# Patient Record
Sex: FEMALE | Race: WHITE | NOT HISPANIC OR LATINO | Employment: UNEMPLOYED | URBAN - NONMETROPOLITAN AREA
[De-identification: names, ages, dates, MRNs, and addresses within clinical notes are randomized per-mention and may not be internally consistent; named-entity substitution may affect disease eponyms.]

---

## 2017-01-04 ENCOUNTER — CONSULT (OUTPATIENT)
Dept: CARDIOLOGY | Facility: CLINIC | Age: 42
End: 2017-01-04

## 2017-01-04 VITALS
OXYGEN SATURATION: 99 % | DIASTOLIC BLOOD PRESSURE: 80 MMHG | WEIGHT: 155.6 LBS | BODY MASS INDEX: 26.56 KG/M2 | RESPIRATION RATE: 16 BRPM | SYSTOLIC BLOOD PRESSURE: 118 MMHG | HEIGHT: 64 IN | HEART RATE: 100 BPM

## 2017-01-04 DIAGNOSIS — R00.2 PALPITATIONS: Primary | ICD-10-CM

## 2017-01-04 PROCEDURE — 99203 OFFICE O/P NEW LOW 30 MIN: CPT | Performed by: INTERNAL MEDICINE

## 2017-01-04 RX ORDER — PROPRANOLOL HYDROCHLORIDE 10 MG/1
TABLET ORAL 3 TIMES DAILY PRN
COMMUNITY
Start: 2016-12-05 | End: 2017-01-25

## 2017-01-04 NOTE — PROGRESS NOTES
"    Subjective:     Encounter Date:01/04/2017      Patient ID: Stacy Guardado is a 41 y.o. female.    Chief Complaint: Palpitations.  HPI  This is a 41-year-old female patient who is been experiencing palpitations for a little over a month.  The patient was initially seen by local cardiologist who did a Holter monitor that demonstrated no evidence of arrhythmia or significant underlying ectopy.  The patient indicates that she develops a sense that her heart is racing.  There is no associated dizziness or syncope.  The patient also complains that her entire body is \"tingling\".  She has no focal numbness weakness or paralysis.  She has no change in vision or difficulty with speech.  She has had no incoordination or gait disturbances.  The patient was also experiencing lower extremity edema and there was concern that she may have early chronic venous stasis.  Her lower extremity edema has now spontaneously resolved without specific intervention.  The tingling episodes occur every day and last almost continuously throughout her waking hours.  She indicates that she drinks one to 2 caffeinated beverages per day.  There is no history of thyroid disease.  She smokes one pack of cigarettes per day.  She was previously running up to 3 miles 3 times per week but has stopped exercising.  She has no chest pain or shortness of breath.  There is no dyspnea with activity.  There is no exertional chest arm neck jaw shoulder or back discomfort.  There is no orthopnea or PND.  She has no personal history of hypertension diabetes or hypercholesterolemia.  Her family history is strongly positive for premature coronary disease.  The following portions of the patient's history were reviewed and updated as appropriate: allergies, current medications, past family history, past medical history, past social history, past surgical history and problem  Review of Systems   Constitution: Negative for chills, diaphoresis, fever, weakness, " malaise/fatigue, weight gain and weight loss.   HENT: Negative for ear discharge, hearing loss, hoarse voice and nosebleeds.    Eyes: Negative for discharge, double vision, pain and photophobia.   Cardiovascular: Positive for palpitations. Negative for chest pain, claudication, cyanosis, dyspnea on exertion, irregular heartbeat, leg swelling, near-syncope, orthopnea, paroxysmal nocturnal dyspnea and syncope.   Respiratory: Negative for cough, hemoptysis, shortness of breath, sputum production and wheezing.    Endocrine: Negative for cold intolerance, heat intolerance, polydipsia, polyphagia and polyuria.   Hematologic/Lymphatic: Negative for adenopathy and bleeding problem. Does not bruise/bleed easily.   Skin: Negative for color change, flushing, itching and rash.   Musculoskeletal: Negative for muscle cramps, muscle weakness, myalgias and stiffness.   Gastrointestinal: Negative for abdominal pain, diarrhea, hematemesis, hematochezia, nausea and vomiting.   Genitourinary: Negative for dysuria, frequency and nocturia.   Neurological: Negative for dizziness, focal weakness, light-headedness, loss of balance, numbness, paresthesias and seizures.   Psychiatric/Behavioral: Negative for altered mental status, hallucinations and suicidal ideas.   Allergic/Immunologic: Negative for HIV exposure, hives and persistent infections.       Current Outpatient Prescriptions:   •  propranolol (INDERAL) 10 MG tablet, 3 (Three) Times a Day As Needed., Disp: , Rfl:      Objective:     Physical Exam   Constitutional: She is oriented to person, place, and time. She appears well-developed and well-nourished.   HENT:   Head: Normocephalic and atraumatic.   Mouth/Throat: Oropharynx is clear and moist.   Eyes: Conjunctivae and EOM are normal. Pupils are equal, round, and reactive to light. No scleral icterus.   Neck: Normal range of motion. Neck supple. No JVD present. No tracheal deviation present. No thyromegaly present.   Cardiovascular:  "Normal rate, regular rhythm, S1 normal, S2 normal, normal heart sounds, intact distal pulses and normal pulses.  PMI is not displaced.  Exam reveals no gallop and no friction rub.    No murmur heard.  Pulmonary/Chest: Effort normal and breath sounds normal. No respiratory distress. She has no wheezes. She has no rales.   Abdominal: Soft. Bowel sounds are normal. She exhibits no distension and no mass. There is no tenderness. There is no rebound and no guarding.   Musculoskeletal: Normal range of motion. She exhibits no edema or deformity.   Neurological: She is alert and oriented to person, place, and time. She displays normal reflexes. No cranial nerve deficit. Coordination normal.   Skin: Skin is warm and dry. No rash noted. No erythema.   Psychiatric: She has a normal mood and affect. Her behavior is normal. Thought content normal.     Blood pressure 118/80, pulse 100, resp. rate 16, height 64\" (162.6 cm), weight 155 lb 9.6 oz (70.6 kg), SpO2 99 %.   Lab Review:       Assessment:         1. Palpitations  The patient indicates that she does not feel that she was having any tachycardia symptoms while wearing the Holter monitor.  She indicates that she had some sense of tachycardia last evening and took a when necessary propranolol which seems to help.  - Cardiac Event Monitor; Future  Procedures     Plan:       I have recommended a 30 day event recorder.  I have indicated that she can continue to use the propranolol on an as-needed basis.  He has been counseled regarding the need to discontinue cigarette smoking.  She has been counseled regarding the importance of limiting intake of caffeinated beverages as well as avoiding dietary sodium.  She is encouraged to resume her physical activities.  No changes in her medication profile have been made at today's visit.  Further recommendations will be predicated on the outcome of her outpatient testing.         "

## 2017-01-04 NOTE — MR AVS SNAPSHOT
Stacy Guardado   2017 3:00 PM   Consult    Dept Phone:  [phone]   Encounter #:  [id number]    Provider:  Nitin Raymond MD   Department:  [facility] CARDIOLOGY                Your Full Care Plan              Your Updated Medication List          This list is accurate as of: 17  3:34 PM.  Always use your most recent med list.                propranolol 10 MG tablet   Commonly known as:  INDERAL               You Were Diagnosed With        Codes Comments    Palpitations    -  Primary ICD-10-CM: R00.2  ICD-9-CM: 785.1       Instructions     None    Patient Instructions History      Upcoming Appointments     Visit Type Date Time Department    CONSULT 2017  3:00 PM [facility] [place]    FOLLOW UP 2017  9:30 AM [facility] [place]      [facility] Signup     [facility] allows you to send messages to your doctor, view your test results, renew your prescriptions, schedule appointments, and more. To sign up, go to [employer] and click on the Sign Up Now link in the New User? box. Enter your [facility] Activation Code exactly as it appears below along with the last four digits of your Social Security Number and your Date of Birth () to complete the sign-up process. If you do not sign up before the expiration date, you must request a new code.    [facility] Activation Code: [email or web address]  Expires: 2017  3:32 PM    If you have questions, you can email [email or web address] or call [phone] to talk to our [facility] staff. Remember, [facility] is NOT to be used for urgent needs. For medical emergencies, dial 911.               Other Info from Your Visit           Your Appointments     2017  9:30 AM EST   Follow Up with Nitin Raymond MD   [facility] CARDIOLOGY (--)    [address] [place] [id number] [phone]           Arrive 15 minutes prior to appointment.            "     Allergies     No Known Allergies      Reason for Visit     Advice Only     Rapid Heart Rate     Edema     Palpitations           Vital Signs     Blood Pressure Pulse Respirations Height Weight Oxygen Saturation    118/80 (BP Location: Left arm, Patient Position: Sitting, Cuff Size: Adult) 100 16 64\" (162.6 cm) 155 lb 9.6 oz (70.6 kg) 99%    Body Mass Index Smoking Status                26.71 kg/m2 Current Every Day Smoker          Problems and Diagnoses Noted     Palpitations        "

## 2017-01-04 NOTE — LETTER
January 4, 2017     Sophia Pak MD  [address] [place] [address]    Patient: Stacy Guardado   YOB: 1975   Date of Visit: 1/4/2017       Dear Dr. Sierra Pak MD:    Thank you for referring Stacy Guardado to me for evaluation. Below are the relevant portions of my assessment and plan of care.  I had the opportunity to see your patient in cardiology clinic today.  This is a 41-year-old female patient who has been experiencing palpitations in which she describes a sense that her heart is racing.  She also reports having the sensation that she is tingling all over.  This occurs most every day and the tingling sensation is present throughout her waking hours.  She does not seem to use much in the way of caffeinated beverages but she does smoke.  She had previously seen a local cardiologist.  Holter monitor which showed no evidence of arrhythmia or significant ectopy.  She has no chest pain or shortness of breath.  She was previously experiencing swelling of her lower extremities but this has resolved without specific intervention.  She indicates that she has had no swelling in the last one month.  I have recommended a 30 day event recorder.  No additional change in her medication profile as warranted at this time.  Further recommendations will be predicated on the outcome of her outpatient testing.  If you have questions, please do not hesitate to call me. I look forward to following Stacy along with you.         Sincerely,        Nitin Raymond MD        CC: No Recipients  Nitin Raymond MD  1/4/2017  3:39 PM  Signed      Subjective:     Encounter Date:01/04/2017      Patient ID: Stacy Guardado is a 41 y.o. female.    Chief Complaint: Palpitations.  HPI  This is a 41-year-old female patient who is been experiencing palpitations for a little over a month.  The patient was initially seen by local cardiologist who did a Holter monitor that demonstrated no  "evidence of arrhythmia or significant underlying ectopy.  The patient indicates that she develops a sense that her heart is racing.  There is no associated dizziness or syncope.  The patient also complains that her entire body is \"tingling\".  She has no focal numbness weakness or paralysis.  She has no change in vision or difficulty with speech.  She has had no incoordination or gait disturbances.  The patient was also experiencing lower extremity edema and there was concern that she may have early chronic venous stasis.  Her lower extremity edema has now spontaneously resolved without specific intervention.  The tingling episodes occur every day and last almost continuously throughout her waking hours.  She indicates that she drinks one to 2 caffeinated beverages per day.  There is no history of thyroid disease.  She smokes one pack of cigarettes per day.  She was previously running up to 3 miles 3 times per week but has stopped exercising.  She has no chest pain or shortness of breath.  There is no dyspnea with activity.  There is no exertional chest arm neck jaw shoulder or back discomfort.  There is no orthopnea or PND.  She has no personal history of hypertension diabetes or hypercholesterolemia.  Her family history is strongly positive for premature coronary disease.  The following portions of the patient's history were reviewed and updated as appropriate: allergies, current medications, past family history, past medical history, past social history, past surgical history and problem  Review of Systems   Constitution: Negative for chills, diaphoresis, fever, weakness, malaise/fatigue, weight gain and weight loss.   HENT: Negative for ear discharge, hearing loss, hoarse voice and nosebleeds.    Eyes: Negative for discharge, double vision, pain and photophobia.   Cardiovascular: Positive for palpitations. Negative for chest pain, claudication, cyanosis, dyspnea on exertion, irregular heartbeat, leg swelling, " near-syncope, orthopnea, paroxysmal nocturnal dyspnea and syncope.   Respiratory: Negative for cough, hemoptysis, shortness of breath, sputum production and wheezing.    Endocrine: Negative for cold intolerance, heat intolerance, polydipsia, polyphagia and polyuria.   Hematologic/Lymphatic: Negative for adenopathy and bleeding problem. Does not bruise/bleed easily.   Skin: Negative for color change, flushing, itching and rash.   Musculoskeletal: Negative for muscle cramps, muscle weakness, myalgias and stiffness.   Gastrointestinal: Negative for abdominal pain, diarrhea, hematemesis, hematochezia, nausea and vomiting.   Genitourinary: Negative for dysuria, frequency and nocturia.   Neurological: Negative for dizziness, focal weakness, light-headedness, loss of balance, numbness, paresthesias and seizures.   Psychiatric/Behavioral: Negative for altered mental status, hallucinations and suicidal ideas.   Allergic/Immunologic: Negative for HIV exposure, hives and persistent infections.       Current Outpatient Prescriptions:   •  propranolol (INDERAL) 10 MG tablet, 3 (Three) Times a Day As Needed., Disp: , Rfl:      Objective:     Physical Exam   Constitutional: She is oriented to person, place, and time. She appears well-developed and well-nourished.   HENT:   Head: Normocephalic and atraumatic.   Mouth/Throat: Oropharynx is clear and moist.   Eyes: Conjunctivae and EOM are normal. Pupils are equal, round, and reactive to light. No scleral icterus.   Neck: Normal range of motion. Neck supple. No JVD present. No tracheal deviation present. No thyromegaly present.   Cardiovascular: Normal rate, regular rhythm, S1 normal, S2 normal, normal heart sounds, intact distal pulses and normal pulses.  PMI is not displaced.  Exam reveals no gallop and no friction rub.    No murmur heard.  Pulmonary/Chest: Effort normal and breath sounds normal. No respiratory distress. She has no wheezes. She has no rales.   Abdominal: Soft.  "Bowel sounds are normal. She exhibits no distension and no mass. There is no tenderness. There is no rebound and no guarding.   Musculoskeletal: Normal range of motion. She exhibits no edema or deformity.   Neurological: She is alert and oriented to person, place, and time. She displays normal reflexes. No cranial nerve deficit. Coordination normal.   Skin: Skin is warm and dry. No rash noted. No erythema.   Psychiatric: She has a normal mood and affect. Her behavior is normal. Thought content normal.     Blood pressure 118/80, pulse 100, resp. rate 16, height 64\" (162.6 cm), weight 155 lb 9.6 oz (70.6 kg), SpO2 99 %.   Lab Review:       Assessment:         1. Palpitations  The patient indicates that she does not feel that she was having any tachycardia symptoms while wearing the Holter monitor.  She indicates that she had some sense of tachycardia last evening and took a when necessary propranolol which seems to help.  - Cardiac Event Monitor; Future  Procedures     Plan:       I have recommended a 30 day event recorder.  I have indicated that she can continue to use the propranolol on an as-needed basis.  He has been counseled regarding the need to discontinue cigarette smoking.  She has been counseled regarding the importance of limiting intake of caffeinated beverages as well as avoiding dietary sodium.  She is encouraged to resume her physical activities.  No changes in her medication profile have been made at today's visit.  Further recommendations will be predicated on the outcome of her outpatient testing.         "

## 2017-01-06 PROBLEM — F32.A DEPRESSION: Status: ACTIVE | Noted: 2017-01-06

## 2017-01-06 PROBLEM — F41.9 ANXIETY: Status: ACTIVE | Noted: 2017-01-06

## 2017-01-06 PROBLEM — Z72.0 TOBACCO ABUSE: Status: ACTIVE | Noted: 2017-01-06

## 2017-01-06 PROBLEM — R29.810 FACIAL DROOP: Status: ACTIVE | Noted: 2017-01-06

## 2017-01-25 ENCOUNTER — HOSPITAL ENCOUNTER (EMERGENCY)
Facility: HOSPITAL | Age: 42
Discharge: HOME | End: 2017-01-25
Attending: EMERGENCY MEDICINE | Admitting: EMERGENCY MEDICINE

## 2017-01-25 VITALS
OXYGEN SATURATION: 98 % | WEIGHT: 154 LBS | HEART RATE: 89 BPM | SYSTOLIC BLOOD PRESSURE: 117 MMHG | TEMPERATURE: 98.6 F | HEIGHT: 64 IN | RESPIRATION RATE: 18 BRPM | BODY MASS INDEX: 26.29 KG/M2 | DIASTOLIC BLOOD PRESSURE: 77 MMHG

## 2017-01-25 DIAGNOSIS — R20.2 TINGLING IN EXTREMITIES: Primary | ICD-10-CM

## 2017-01-25 LAB
ANION GAP SERPL CALCULATED.3IONS-SCNC: 14.1 MMOL/L
B-HCG UR QL: NEGATIVE
BASOPHILS # BLD AUTO: 0.06 10*3/MM3 (ref 0–0.2)
BASOPHILS NFR BLD AUTO: 0.6 % (ref 0–2.5)
BILIRUB UR QL STRIP: NEGATIVE
BUN BLD-MCNC: 9 MG/DL (ref 7–20)
BUN/CREAT SERPL: 11.3 (ref 7.1–23.5)
CALCIUM SPEC-SCNC: 9.7 MG/DL (ref 8.4–10.2)
CHLORIDE SERPL-SCNC: 108 MMOL/L (ref 98–107)
CLARITY UR: CLEAR
CO2 SERPL-SCNC: 27 MMOL/L (ref 26–30)
COLOR UR: YELLOW
CREAT BLD-MCNC: 0.8 MG/DL (ref 0.6–1.3)
DEPRECATED RDW RBC AUTO: 46.1 FL (ref 37–54)
EOSINOPHIL # BLD AUTO: 0.03 10*3/MM3 (ref 0–0.7)
EOSINOPHIL NFR BLD AUTO: 0.3 % (ref 0–7)
ERYTHROCYTE [DISTWIDTH] IN BLOOD BY AUTOMATED COUNT: 13 % (ref 11.5–14.5)
GFR SERPL CREATININE-BSD FRML MDRD: 80 ML/MIN/1.73
GLUCOSE BLD-MCNC: 80 MG/DL (ref 74–98)
GLUCOSE UR STRIP-MCNC: NEGATIVE MG/DL
HCT VFR BLD AUTO: 46.2 % (ref 37–47)
HGB BLD-MCNC: 15.3 G/DL (ref 12–16)
HGB UR QL STRIP.AUTO: NEGATIVE
HOLD SPECIMEN: NORMAL
HOLD SPECIMEN: NORMAL
IMM GRANULOCYTES # BLD: 0.06 10*3/MM3 (ref 0–0.06)
IMM GRANULOCYTES NFR BLD: 0.6 % (ref 0–0.6)
KETONES UR QL STRIP: NEGATIVE
LEUKOCYTE ESTERASE UR QL STRIP.AUTO: NEGATIVE
LYMPHOCYTES # BLD AUTO: 2.54 10*3/MM3 (ref 0.6–3.4)
LYMPHOCYTES NFR BLD AUTO: 24.1 % (ref 10–50)
MCH RBC QN AUTO: 31.8 PG (ref 27–31)
MCHC RBC AUTO-ENTMCNC: 33.1 G/DL (ref 30–37)
MCV RBC AUTO: 96 FL (ref 81–99)
MONOCYTES # BLD AUTO: 0.67 10*3/MM3 (ref 0–0.9)
MONOCYTES NFR BLD AUTO: 6.4 % (ref 0–12)
NEUTROPHILS # BLD AUTO: 7.18 10*3/MM3 (ref 2–6.9)
NEUTROPHILS NFR BLD AUTO: 68 % (ref 37–80)
NITRITE UR QL STRIP: NEGATIVE
NRBC BLD MANUAL-RTO: 0 /100 WBC (ref 0–0)
PH UR STRIP.AUTO: 7 [PH] (ref 5–8)
PLATELET # BLD AUTO: 266 10*3/MM3 (ref 130–400)
PMV BLD AUTO: 11.6 FL (ref 6–12)
POTASSIUM BLD-SCNC: 4.1 MMOL/L (ref 3.5–5.1)
PROT UR QL STRIP: NEGATIVE
RBC # BLD AUTO: 4.81 10*6/MM3 (ref 4.2–5.4)
SODIUM BLD-SCNC: 145 MMOL/L (ref 137–145)
SP GR UR STRIP: 1.02 (ref 1–1.03)
UROBILINOGEN UR QL STRIP: NORMAL
WBC NRBC COR # BLD: 10.54 10*3/MM3 (ref 4.8–10.8)
WHOLE BLOOD HOLD SPECIMEN: NORMAL
WHOLE BLOOD HOLD SPECIMEN: NORMAL

## 2017-01-25 PROCEDURE — 80048 BASIC METABOLIC PNL TOTAL CA: CPT | Performed by: EMERGENCY MEDICINE

## 2017-01-25 PROCEDURE — 81025 URINE PREGNANCY TEST: CPT | Performed by: EMERGENCY MEDICINE

## 2017-01-25 PROCEDURE — 93005 ELECTROCARDIOGRAM TRACING: CPT | Performed by: EMERGENCY MEDICINE

## 2017-01-25 PROCEDURE — 85025 COMPLETE CBC W/AUTO DIFF WBC: CPT | Performed by: EMERGENCY MEDICINE

## 2017-01-25 PROCEDURE — 36415 COLL VENOUS BLD VENIPUNCTURE: CPT

## 2017-01-25 PROCEDURE — 81003 URINALYSIS AUTO W/O SCOPE: CPT | Performed by: EMERGENCY MEDICINE

## 2017-01-25 PROCEDURE — 99284 EMERGENCY DEPT VISIT MOD MDM: CPT

## 2017-01-25 NOTE — ED PROVIDER NOTES
Subjective   History of Present Illness   41-year-old female otherwise healthy presenting with 2 months of intermittent tingling in her hands, lips, legs area she has seen multiple providers for this and has been noted to be tachycardic during multiple visits.  Comes into the emergency department now because she felt the symptoms recurred this morning.  Denies any other specific symptoms including fevers, chills, chest pain, trouble breathing, nausea, vomiting, diarrhea, dysuria.    Review of Systems   Neurological:        Parasthesias BUE   All other systems reviewed and are negative.      Past Medical History   Diagnosis Date   • Abnormal ECG    • Current every day smoker        No Known Allergies    History reviewed. No pertinent past surgical history.    Family History   Problem Relation Age of Onset   • Hyperlipidemia Mother    • Hyperlipidemia Father    • Hypertension Father    • Stroke Brother    • Heart attack Maternal Grandfather    • Heart disease Maternal Grandfather    • Heart disease Paternal Grandfather    • Heart attack Paternal Grandfather    • Hyperlipidemia Brother    • Alcohol abuse Other    • Diabetes Other    • Hypertension Other    • Stroke Other        Social History     Social History   • Marital status:      Spouse name: N/A   • Number of children: N/A   • Years of education: N/A     Social History Main Topics   • Smoking status: Current Every Day Smoker     Packs/day: 1.00     Types: Cigarettes   • Smokeless tobacco: Never Used   • Alcohol use Yes      Comment: rare   • Drug use: No   • Sexual activity: Not Asked     Other Topics Concern   • None     Social History Narrative   • None           Objective   Physical Exam   Constitutional: She is oriented to person, place, and time. She appears well-nourished. No distress.   HENT:   Head: Normocephalic.   Mouth/Throat: Oropharynx is clear and moist. No oropharyngeal exudate.   Eyes: Conjunctivae are normal. No scleral icterus.   Neck:  Neck supple. No tracheal deviation present.   Cardiovascular: Regular rhythm, normal heart sounds and intact distal pulses.  Exam reveals no gallop and no friction rub.    No murmur heard.  tachycardic   Pulmonary/Chest: Effort normal and breath sounds normal. No stridor. No respiratory distress. She has no wheezes. She has no rales. She exhibits no tenderness.   Abdominal: Soft. She exhibits no distension and no mass. There is no tenderness. There is no rebound and no guarding. No hernia.   Musculoskeletal: She exhibits no edema or deformity.   Neurological: She is alert and oriented to person, place, and time.   Strength and sensation intact in bilateral upper extremities.   Skin: Skin is warm and dry. She is not diaphoretic. No erythema. No pallor.   Psychiatric: She has a normal mood and affect. Her behavior is normal.   Nursing note and vitals reviewed.      Procedures         ED Course  ED Course                  MDM  41-year-old female 2 months of intermittent paresthesias in hands.  Afebrile, tachycardic.  On exam, neurovascularly intact.  Lungs are clear.  Low suspicion for occult infection given she hasn't worked up for this all times reason.  Unclear etiology of her elevated heart rate or paresthesias, she is already seeing a cardiologist and is wearing a Holter monitor now.  She had her thyroid levels checked about a month and a half ago and they were normal.  She denies any specific symptoms to suggest an occult infection.  I will check CBC, BMP, EKG, urine pregnancy test and if everything is reassuring, will discharge patient home.    EKG: NSR w/ nl intervals, no torrey/std, HR 95.     4:02 PM Labs overall unremarkable.  Heart rate improved.  We'll discharge home with recommendation to follow-up with drug patient providers as scheduled.  Discussed strict return to care precautions.    Final diagnoses:   Tingling in extremities            Demond Spears MD  01/25/17 1247

## 2017-02-14 ENCOUNTER — OFFICE VISIT (OUTPATIENT)
Dept: CARDIOLOGY | Facility: CLINIC | Age: 42
End: 2017-02-14

## 2017-02-14 VITALS
BODY MASS INDEX: 26.91 KG/M2 | RESPIRATION RATE: 16 BRPM | WEIGHT: 157.6 LBS | DIASTOLIC BLOOD PRESSURE: 80 MMHG | OXYGEN SATURATION: 97 % | SYSTOLIC BLOOD PRESSURE: 132 MMHG | HEART RATE: 108 BPM | HEIGHT: 64 IN

## 2017-02-14 DIAGNOSIS — R00.2 PALPITATIONS: Primary | ICD-10-CM

## 2017-02-14 PROCEDURE — 99213 OFFICE O/P EST LOW 20 MIN: CPT | Performed by: INTERNAL MEDICINE

## 2017-02-14 NOTE — PROGRESS NOTES
Subjective:     Encounter Date:02/14/2017      Patient ID: Stacy Guardado is a 41 y.o. female.    Chief Complaint: Palpitations  HPI  This is a 41-year-old female patient who had previously worn a heart monitor without specific findings.  The patient has now worn a 30 day event recorder.  During the 30 day monitoring.  The patient had 13 rhythm strips for review all of which demonstrated sinus tachycardia to rates of just under 120 bpm.  There was no supraventricular or ventricular tachycardia noted.  The patient continues to have palpitations on occasion.  There is no chest pain or shortness of breath.  There is no orthopnea PND or lower extremity edema.  There is no dizziness or syncope.  Unfortunately she continues to smoke.  She has resumed regular exercise and begin a running program.  The remainder of her past medical history, family history and social history remains unchanged compared to her last visit.  The following portions of the patient's history were reviewed and updated as appropriate: allergies, current medications, past family history, past medical history, past social history, past surgical history and problem  Review of Systems   Constitution: Negative for chills, diaphoresis, fever, weakness, malaise/fatigue, night sweats, weight gain and weight loss.   HENT: Negative for ear discharge, hearing loss, hoarse voice and nosebleeds.    Eyes: Negative for discharge, double vision, pain and photophobia.   Cardiovascular: Positive for palpitations. Negative for chest pain, claudication, cyanosis, dyspnea on exertion, irregular heartbeat, leg swelling, near-syncope, orthopnea and paroxysmal nocturnal dyspnea.   Respiratory: Negative for cough, hemoptysis, shortness of breath, sputum production and wheezing.    Endocrine: Negative for cold intolerance, heat intolerance, polydipsia, polyphagia and polyuria.   Hematologic/Lymphatic: Negative for adenopathy and bleeding problem. Does not  "bruise/bleed easily.   Skin: Negative for color change, flushing, itching and rash.   Musculoskeletal: Negative for muscle cramps, muscle weakness, myalgias and stiffness.   Gastrointestinal: Negative for abdominal pain, diarrhea, hematemesis, hematochezia, nausea and vomiting.   Genitourinary: Negative for dysuria, frequency and nocturia.   Neurological: Negative for focal weakness, loss of balance, numbness, paresthesias and seizures.   Psychiatric/Behavioral: Negative for altered mental status, hallucinations and suicidal ideas.   Allergic/Immunologic: Negative for HIV exposure, hives and persistent infections.         No current outpatient prescriptions on file.     Objective:     Physical Exam   Constitutional: She is oriented to person, place, and time. She appears well-developed and well-nourished.   HENT:   Head: Normocephalic and atraumatic.   Eyes: Conjunctivae are normal. No scleral icterus.   Cardiovascular: Normal rate, regular rhythm, normal heart sounds and intact distal pulses.  Exam reveals no gallop and no friction rub.    No murmur heard.  Pulmonary/Chest: Effort normal and breath sounds normal. No respiratory distress.   Abdominal: Soft. Bowel sounds are normal. There is no tenderness.   Musculoskeletal: She exhibits no edema.   Neurological: She is alert and oriented to person, place, and time.   Skin: Skin is warm and dry.   Psychiatric: She has a normal mood and affect. Her behavior is normal.     Blood pressure 132/80, pulse 108, resp. rate 16, height 64\" (162.6 cm), weight 157 lb 9.6 oz (71.5 kg), SpO2 97 %.   Lab Review:       Assessment:         1. Palpitations  No evidence of arrhythmia.  The patient does demonstrate a resting sinus tachycardia.  I have offered her the option of beta blockade but she prefers not to take medications.  I suspect her sinus tachycardia is due to a combination of ongoing tobacco abuse with nicotine serving as a cardiac stimulants as well as an element of " aerobic deconditioning.  Procedures     Plan:       She has been encouraged to continue her running program.  We have discussed how to calculate an age-predicted maximum heart rate and heart rate goals for exercise.  She has been counseled regarding the essential need to discontinue cigarette smoking.  No changes to her medication profile have been made at today's visit.  No further cardiovascular testing is indicated at this time.  She will follow-up in our office in one year.

## 2017-06-08 ENCOUNTER — TRANSCRIBE ORDERS (OUTPATIENT)
Dept: ULTRASOUND IMAGING | Facility: HOSPITAL | Age: 42
End: 2017-06-08

## 2017-06-08 DIAGNOSIS — R19.8 CHANGE IN BOWEL FUNCTION: Primary | ICD-10-CM

## 2017-07-05 ENCOUNTER — HOSPITAL ENCOUNTER (OUTPATIENT)
Dept: ULTRASOUND IMAGING | Facility: HOSPITAL | Age: 42
Discharge: HOME | End: 2017-07-05
Admitting: NURSE PRACTITIONER

## 2017-07-05 DIAGNOSIS — R19.8 CHANGE IN BOWEL FUNCTION: ICD-10-CM

## 2017-07-05 PROCEDURE — 76700 US EXAM ABDOM COMPLETE: CPT

## 2017-09-24 ENCOUNTER — HOSPITAL ENCOUNTER (EMERGENCY)
Facility: HOSPITAL | Age: 42
Discharge: HOME | End: 2017-09-24
Attending: EMERGENCY MEDICINE | Admitting: STUDENT IN AN ORGANIZED HEALTH CARE EDUCATION/TRAINING PROGRAM

## 2017-09-24 VITALS
DIASTOLIC BLOOD PRESSURE: 67 MMHG | OXYGEN SATURATION: 97 % | BODY MASS INDEX: 26.46 KG/M2 | HEART RATE: 92 BPM | HEIGHT: 64 IN | TEMPERATURE: 98.7 F | SYSTOLIC BLOOD PRESSURE: 113 MMHG | RESPIRATION RATE: 18 BRPM | WEIGHT: 155 LBS

## 2017-09-24 DIAGNOSIS — R00.2 PALPITATIONS: Primary | ICD-10-CM

## 2017-09-24 DIAGNOSIS — E87.6 HYPOKALEMIA: ICD-10-CM

## 2017-09-24 LAB
ALBUMIN SERPL-MCNC: 4.7 G/DL (ref 3.5–5)
ALBUMIN/GLOB SERPL: 1.7 G/DL (ref 1–2)
ALP SERPL-CCNC: 85 U/L (ref 38–126)
ALT SERPL W P-5'-P-CCNC: 39 U/L (ref 13–69)
ANION GAP SERPL CALCULATED.3IONS-SCNC: 16.4 MMOL/L
AST SERPL-CCNC: 23 U/L (ref 15–46)
B-HCG UR QL: NEGATIVE
BACTERIA UR QL AUTO: ABNORMAL /HPF
BASOPHILS # BLD AUTO: 0.05 10*3/MM3 (ref 0–0.2)
BASOPHILS NFR BLD AUTO: 0.6 % (ref 0–2.5)
BILIRUB SERPL-MCNC: 0.3 MG/DL (ref 0.2–1.3)
BILIRUB UR QL STRIP: NEGATIVE
BUN BLD-MCNC: 10 MG/DL (ref 7–20)
BUN/CREAT SERPL: 11.1 (ref 7.1–23.5)
CALCIUM SPEC-SCNC: 9.3 MG/DL (ref 8.4–10.2)
CHLORIDE SERPL-SCNC: 104 MMOL/L (ref 98–107)
CLARITY UR: CLEAR
CO2 SERPL-SCNC: 26 MMOL/L (ref 26–30)
COLOR UR: YELLOW
CREAT BLD-MCNC: 0.9 MG/DL (ref 0.6–1.3)
DEPRECATED RDW RBC AUTO: 47.9 FL (ref 37–54)
EOSINOPHIL # BLD AUTO: 0.04 10*3/MM3 (ref 0–0.7)
EOSINOPHIL NFR BLD AUTO: 0.5 % (ref 0–7)
ERYTHROCYTE [DISTWIDTH] IN BLOOD BY AUTOMATED COUNT: 13.6 % (ref 11.5–14.5)
GFR SERPL CREATININE-BSD FRML MDRD: 69 ML/MIN/1.73
GLOBULIN UR ELPH-MCNC: 2.8 GM/DL
GLUCOSE BLD-MCNC: 109 MG/DL (ref 74–98)
GLUCOSE UR STRIP-MCNC: NEGATIVE MG/DL
HCT VFR BLD AUTO: 45.7 % (ref 37–47)
HGB BLD-MCNC: 14.9 G/DL (ref 12–16)
HGB UR QL STRIP.AUTO: ABNORMAL
HOLD SPECIMEN: NORMAL
HOLD SPECIMEN: NORMAL
HYALINE CASTS UR QL AUTO: ABNORMAL /LPF
IMM GRANULOCYTES # BLD: 0.04 10*3/MM3 (ref 0–0.06)
IMM GRANULOCYTES NFR BLD: 0.5 % (ref 0–0.6)
KETONES UR QL STRIP: NEGATIVE
LEUKOCYTE ESTERASE UR QL STRIP.AUTO: NEGATIVE
LYMPHOCYTES # BLD AUTO: 2.6 10*3/MM3 (ref 0.6–3.4)
LYMPHOCYTES NFR BLD AUTO: 31.3 % (ref 10–50)
MCH RBC QN AUTO: 31 PG (ref 27–31)
MCHC RBC AUTO-ENTMCNC: 32.6 G/DL (ref 30–37)
MCV RBC AUTO: 95.2 FL (ref 81–99)
MONOCYTES # BLD AUTO: 0.77 10*3/MM3 (ref 0–0.9)
MONOCYTES NFR BLD AUTO: 9.3 % (ref 0–12)
NEUTROPHILS # BLD AUTO: 4.82 10*3/MM3 (ref 2–6.9)
NEUTROPHILS NFR BLD AUTO: 57.8 % (ref 37–80)
NITRITE UR QL STRIP: NEGATIVE
NRBC BLD MANUAL-RTO: 0 /100 WBC (ref 0–0)
PH UR STRIP.AUTO: 6.5 [PH] (ref 5–8)
PLATELET # BLD AUTO: 224 10*3/MM3 (ref 130–400)
PMV BLD AUTO: 12.3 FL (ref 6–12)
POTASSIUM BLD-SCNC: 3.4 MMOL/L (ref 3.5–5.1)
PROT SERPL-MCNC: 7.5 G/DL (ref 6.3–8.2)
PROT UR QL STRIP: NEGATIVE
RBC # BLD AUTO: 4.8 10*6/MM3 (ref 4.2–5.4)
RBC # UR: ABNORMAL /HPF
REF LAB TEST METHOD: ABNORMAL
SODIUM BLD-SCNC: 143 MMOL/L (ref 137–145)
SP GR UR STRIP: <=1.005 (ref 1–1.03)
SQUAMOUS #/AREA URNS HPF: ABNORMAL /HPF
TROPONIN I SERPL-MCNC: <0.012 NG/ML (ref 0–0.03)
UROBILINOGEN UR QL STRIP: ABNORMAL
WBC NRBC COR # BLD: 8.32 10*3/MM3 (ref 4.8–10.8)
WBC UR QL AUTO: ABNORMAL /HPF
WHOLE BLOOD HOLD SPECIMEN: NORMAL
WHOLE BLOOD HOLD SPECIMEN: NORMAL

## 2017-09-24 PROCEDURE — 99284 EMERGENCY DEPT VISIT MOD MDM: CPT

## 2017-09-24 PROCEDURE — 96374 THER/PROPH/DIAG INJ IV PUSH: CPT

## 2017-09-24 PROCEDURE — 93005 ELECTROCARDIOGRAM TRACING: CPT | Performed by: NURSE PRACTITIONER

## 2017-09-24 PROCEDURE — 81025 URINE PREGNANCY TEST: CPT | Performed by: NURSE PRACTITIONER

## 2017-09-24 PROCEDURE — 85025 COMPLETE CBC W/AUTO DIFF WBC: CPT | Performed by: NURSE PRACTITIONER

## 2017-09-24 PROCEDURE — 25010000002 ONDANSETRON PER 1 MG: Performed by: NURSE PRACTITIONER

## 2017-09-24 PROCEDURE — 80053 COMPREHEN METABOLIC PANEL: CPT | Performed by: NURSE PRACTITIONER

## 2017-09-24 PROCEDURE — 84484 ASSAY OF TROPONIN QUANT: CPT | Performed by: NURSE PRACTITIONER

## 2017-09-24 PROCEDURE — 81001 URINALYSIS AUTO W/SCOPE: CPT | Performed by: NURSE PRACTITIONER

## 2017-09-24 RX ORDER — POTASSIUM CHLORIDE 750 MG/1
40 CAPSULE, EXTENDED RELEASE ORAL ONCE
Status: COMPLETED | OUTPATIENT
Start: 2017-09-24 | End: 2017-09-24

## 2017-09-24 RX ORDER — SODIUM CHLORIDE 0.9 % (FLUSH) 0.9 %
10 SYRINGE (ML) INJECTION AS NEEDED
Status: DISCONTINUED | OUTPATIENT
Start: 2017-09-24 | End: 2017-09-24 | Stop reason: HOSPADM

## 2017-09-24 RX ORDER — ONDANSETRON 2 MG/ML
4 INJECTION INTRAMUSCULAR; INTRAVENOUS ONCE
Status: COMPLETED | OUTPATIENT
Start: 2017-09-24 | End: 2017-09-24

## 2017-09-24 RX ADMIN — ONDANSETRON 4 MG: 2 INJECTION INTRAMUSCULAR; INTRAVENOUS at 20:00

## 2017-09-24 RX ADMIN — POTASSIUM CHLORIDE 40 MEQ: 750 CAPSULE, EXTENDED RELEASE ORAL at 20:24

## 2017-09-24 RX ADMIN — SODIUM CHLORIDE 1000 ML: 9 INJECTION, SOLUTION INTRAVENOUS at 19:54

## 2017-09-24 NOTE — ED PROVIDER NOTES
Subjective   History of Present Illness  This is a 42-year-old female who presents due to having palpitations today.  She has had a history of palpitations and has had an evaluation with Dr. Raymond including an event monitor and a Holter monitor.  This was done in February and the Holter monitor demonstrated sinus tachycardia.  Dr. Raymond offered her beta blockade therapy and she hated that she did not want to do that at that time.  Dr. Raymond's note indicated he encouraged her to increase her activity and to decrease smoking.  She states that she was doing well until today when she got up this morning she started noticing palpitations.  They became worse throughout the day and she noticed a little bit of shortness of breath with them as well as some tingling in her body.  She denies any chest pain or pressure.  She states that she also felt a little nauseated today.  She states that she does continue to smoke however she did not drink any alcohol over the weekend and has not had any increased caffeine intake.  Review of Systems   All other systems reviewed and are negative.      Past Medical History:   Diagnosis Date   • Abnormal ECG    • Bipolar affective    • Current every day smoker    • Palpitations        No Known Allergies    History reviewed. No pertinent surgical history.    Family History   Problem Relation Age of Onset   • Hyperlipidemia Mother    • Hyperlipidemia Father    • Hypertension Father    • Stroke Brother    • Heart attack Maternal Grandfather    • Heart disease Maternal Grandfather    • Heart disease Paternal Grandfather    • Heart attack Paternal Grandfather    • Hyperlipidemia Brother    • Alcohol abuse Other    • Diabetes Other    • Hypertension Other    • Stroke Other        Social History     Social History   • Marital status:      Spouse name: N/A   • Number of children: N/A   • Years of education: N/A     Social History Main Topics   • Smoking status: Current Every Day  Smoker     Packs/day: 1.00     Types: Cigarettes   • Smokeless tobacco: Never Used   • Alcohol use Yes      Comment: rare   • Drug use: No   • Sexual activity: Not Asked     Other Topics Concern   • None     Social History Narrative   • None           Objective   Physical Exam   Constitutional: She is oriented to person, place, and time. She appears well-developed and well-nourished.   HENT:   Head: Normocephalic and atraumatic.   Mouth/Throat: Oropharynx is clear and moist.   Eyes: Conjunctivae and EOM are normal. Pupils are equal, round, and reactive to light.   Neck: Normal range of motion. Neck supple.   Cardiovascular: Normal rate, regular rhythm, normal heart sounds and intact distal pulses.  Exam reveals no gallop and no friction rub.    No murmur heard.  Pulmonary/Chest: Effort normal and breath sounds normal.   Abdominal: Soft. Bowel sounds are normal. There is no tenderness.   Musculoskeletal: Normal range of motion.   Neurological: She is alert and oriented to person, place, and time.   Skin: Skin is warm and dry.   Psychiatric: She has a normal mood and affect. Her behavior is normal. Judgment and thought content normal.   Nursing note and vitals reviewed.      Procedures         ED Course  ED Course   Comment By Time   Labs and urine are normal.  Patient has had no further tachycardia while here in the ER.  She's had normal sinus rhythm with rates in the 80s to 90s on the monitor.  She states she is rated go home.  I told her that I had reviewed her records from Dr. tam and I would recommend that she contact him to make a follow-up appointment.  She states an understanding.  Potassium is a bit low at 3.4 so I will give her a dose before she leaves. Cara Dudley, APRN 09/24 2010                  [facility]    Final diagnoses:   Palpitations   Hypokalemia            Cara Dudley, APRN  09/25/17 0005

## 2017-11-10 ENCOUNTER — TRANSCRIBE ORDERS (OUTPATIENT)
Dept: MAMMOGRAPHY | Facility: HOSPITAL | Age: 42
End: 2017-11-10

## 2017-11-10 DIAGNOSIS — Z12.39 BREAST CANCER SCREENING: Primary | ICD-10-CM

## 2017-12-14 ENCOUNTER — HOSPITAL ENCOUNTER (OUTPATIENT)
Dept: MAMMOGRAPHY | Facility: HOSPITAL | Age: 42
Discharge: HOME | End: 2017-12-14
Admitting: PHYSICIAN ASSISTANT

## 2017-12-14 DIAGNOSIS — Z12.39 BREAST CANCER SCREENING: ICD-10-CM

## 2017-12-14 PROCEDURE — G0202 SCR MAMMO BI INCL CAD: HCPCS

## 2017-12-14 PROCEDURE — 77063 BREAST TOMOSYNTHESIS BI: CPT

## 2018-02-20 ENCOUNTER — OFFICE VISIT (OUTPATIENT)
Dept: CARDIOLOGY | Facility: CLINIC | Age: 43
End: 2018-02-20

## 2018-02-20 VITALS
HEIGHT: 64 IN | BODY MASS INDEX: 25.64 KG/M2 | RESPIRATION RATE: 16 BRPM | DIASTOLIC BLOOD PRESSURE: 62 MMHG | WEIGHT: 150.2 LBS | OXYGEN SATURATION: 98 % | SYSTOLIC BLOOD PRESSURE: 122 MMHG | HEART RATE: 94 BPM

## 2018-02-20 DIAGNOSIS — R00.2 PALPITATIONS: Primary | ICD-10-CM

## 2018-02-20 DIAGNOSIS — Z72.0 TOBACCO ABUSE: ICD-10-CM

## 2018-02-20 PROCEDURE — 99213 OFFICE O/P EST LOW 20 MIN: CPT | Performed by: INTERNAL MEDICINE

## 2018-02-20 RX ORDER — ATENOLOL 25 MG/1
25 TABLET ORAL SEE ADMIN INSTRUCTIONS
COMMUNITY
Start: 2018-02-06 | End: 2018-02-20 | Stop reason: ALTCHOICE

## 2018-02-20 RX ORDER — BISOPROLOL FUMARATE 5 MG/1
5 TABLET, FILM COATED ORAL DAILY
Qty: 30 TABLET | Refills: 11 | OUTPATIENT
Start: 2018-02-20 | End: 2019-06-04

## 2018-02-20 RX ORDER — BISOPROLOL FUMARATE 5 MG/1
5 TABLET, FILM COATED ORAL DAILY
Qty: 30 TABLET | Refills: 11 | Status: SHIPPED | OUTPATIENT
Start: 2018-02-20 | End: 2018-02-20 | Stop reason: SDUPTHER

## 2018-02-20 NOTE — PROGRESS NOTES
Subjective:     Encounter Date:02/20/2018      Patient ID: Stacy Guardado is a 42 y.o. female.    Chief Complaint:Palpitations  HPI  This is a 42-year-old female patient who has been experiencing palpitations for the last several years due to sinus tachycardia.  The patient has been maintained on low-dose beta blocker and has done reasonably well.  The patient indicates that she is having progressive difficulty splitting the atenolol tablets and does not feel that she is getting the same amount of medication per dose due to unequal size of the pills and pill fragments.  She has had no chest discomfort at rest or with activity.  There is no shortness of breath at rest or with activity.  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  She has had no exertional chest arm neck jaw shoulder or back discomfort.  There is no orthopnea PND or lower extremity edema.  She reports having occasional swelling in her feet and ankles but this is primarily if she's been on her feet for prolonged periods of time; there is no dizziness or syncope.  Her palpitations have improved and she seems to be responding favorably to beta blockade.  Unfortunately she continues to smoke.  The following portions of the patient's history were reviewed and updated as appropriate: allergies, current medications, past family history, past medical history, past social history, past surgical history and problem  Review of Systems   Constitution: Negative for chills, diaphoresis, fever, weakness, malaise/fatigue, night sweats, weight gain and weight loss.   HENT: Negative for ear discharge, hearing loss, hoarse voice and nosebleeds.    Eyes: Negative for discharge, double vision, pain and photophobia.   Cardiovascular: Positive for leg swelling and palpitations. Negative for chest pain, claudication, cyanosis, dyspnea on exertion, irregular heartbeat, near-syncope, orthopnea, paroxysmal nocturnal dyspnea and syncope.   Respiratory:  "Negative for cough, hemoptysis, shortness of breath, sputum production and wheezing.    Endocrine: Negative for cold intolerance, heat intolerance, polydipsia, polyphagia and polyuria.   Hematologic/Lymphatic: Negative for adenopathy and bleeding problem. Does not bruise/bleed easily.   Skin: Negative for color change, flushing, itching and rash.   Musculoskeletal: Negative for muscle cramps, muscle weakness, myalgias and stiffness.   Gastrointestinal: Negative for abdominal pain, diarrhea, hematemesis, hematochezia, nausea and vomiting.   Genitourinary: Negative for dysuria, frequency and nocturia.   Neurological: Negative for focal weakness, loss of balance, numbness, paresthesias and seizures.   Psychiatric/Behavioral: Negative for altered mental status, hallucinations and suicidal ideas.   Allergic/Immunologic: Negative for HIV exposure, hives and persistent infections.       Current Outpatient Prescriptions:   •  bisoprolol (ZEBeta) 5 MG tablet, Take 1 tablet by mouth Daily., Disp: 30 tablet, Rfl: 11     Objective:     Physical Exam   Constitutional: She is oriented to person, place, and time. She appears well-developed and well-nourished.   HENT:   Head: Normocephalic and atraumatic.   Eyes: Conjunctivae are normal. No scleral icterus.   Cardiovascular: Normal rate, regular rhythm, normal heart sounds and intact distal pulses.  Exam reveals no gallop and no friction rub.    No murmur heard.  Pulmonary/Chest: Effort normal and breath sounds normal. No respiratory distress.   Abdominal: Soft. Bowel sounds are normal. There is no tenderness.   Musculoskeletal: She exhibits no edema.   Neurological: She is alert and oriented to person, place, and time.   Skin: Skin is warm and dry.   Psychiatric: She has a normal mood and affect. Her behavior is normal.     Blood pressure 122/62, pulse 94, resp. rate 16, height 162.6 cm (64.02\"), weight 68.1 kg (150 lb 3.2 oz), SpO2 98 %.   Lab Review:       Assessment:         1. " "Palpitations  No evidence of underlying arrhythmia or complex\frequent atrial or ventricular ectopy.  The patient has previously shown a tendency towards sinus tachycardia was felt to be due to a combination of nicotine excess and aerobic deconditioning.    2. Tobacco abuse  Unfortunately the patient continues to smoke.    Procedures     Plan:       At the patient's request we will change her atenolol to bisoprolol 5 mg orally once per day.  This will avoid the need to \"split\" tablets.  This should provide a more consistent day-to-day dosage.  The importance of compliance with medications have been discussed with the patient.  This does not appear to be an issue.  We have also discussed at length the essential need to discontinue cigarette smoking.  The importance of diet exercise and weight management have been reinforced to the patient.  The patient is not overweight at all.  However, time should be set aside for regular aerobic activity.  The patient is a former runner but has not engaged in this activity in quite some time.  She is encouraged to resume regular aerobic physical activity.  No additional cardiovascular testing is warranted at this time.         "

## 2018-11-21 ENCOUNTER — TRANSCRIBE ORDERS (OUTPATIENT)
Dept: ADMINISTRATIVE | Facility: HOSPITAL | Age: 43
End: 2018-11-21

## 2018-11-21 DIAGNOSIS — Z12.39 SCREENING BREAST EXAMINATION: Primary | ICD-10-CM

## 2019-01-07 ENCOUNTER — HOSPITAL ENCOUNTER (OUTPATIENT)
Dept: MAMMOGRAPHY | Facility: HOSPITAL | Age: 44
Discharge: HOME | End: 2019-01-07
Admitting: PHYSICIAN ASSISTANT

## 2019-01-07 DIAGNOSIS — Z12.39 SCREENING BREAST EXAMINATION: ICD-10-CM

## 2019-01-07 PROCEDURE — 77063 BREAST TOMOSYNTHESIS BI: CPT

## 2019-01-07 PROCEDURE — 77067 SCR MAMMO BI INCL CAD: CPT

## 2020-01-05 ENCOUNTER — HOSPITAL ENCOUNTER (EMERGENCY)
Facility: HOSPITAL | Age: 45
Discharge: HOME | End: 2020-01-05
Attending: EMERGENCY MEDICINE | Admitting: EMERGENCY MEDICINE

## 2020-01-05 ENCOUNTER — APPOINTMENT (OUTPATIENT)
Dept: GENERAL RADIOLOGY | Facility: HOSPITAL | Age: 45
End: 2020-01-05

## 2020-01-05 VITALS
TEMPERATURE: 97.7 F | BODY MASS INDEX: 28.48 KG/M2 | RESPIRATION RATE: 16 BRPM | SYSTOLIC BLOOD PRESSURE: 119 MMHG | OXYGEN SATURATION: 98 % | HEIGHT: 64 IN | HEART RATE: 82 BPM | DIASTOLIC BLOOD PRESSURE: 83 MMHG | WEIGHT: 166.8 LBS

## 2020-01-05 DIAGNOSIS — R06.00 DYSPNEA, UNSPECIFIED TYPE: Primary | ICD-10-CM

## 2020-01-05 LAB
ALBUMIN SERPL-MCNC: 4.8 G/DL (ref 3.5–5.2)
ALBUMIN/GLOB SERPL: 1.8 G/DL
ALP SERPL-CCNC: 114 U/L (ref 39–117)
ALT SERPL W P-5'-P-CCNC: 21 U/L (ref 1–33)
ANION GAP SERPL CALCULATED.3IONS-SCNC: 15.8 MMOL/L (ref 5–15)
AST SERPL-CCNC: 17 U/L (ref 1–32)
BASOPHILS # BLD AUTO: 0.04 10*3/MM3 (ref 0–0.2)
BASOPHILS NFR BLD AUTO: 0.5 % (ref 0–1.5)
BILIRUB SERPL-MCNC: <0.2 MG/DL (ref 0.2–1.2)
BUN BLD-MCNC: 13 MG/DL (ref 6–20)
BUN/CREAT SERPL: 15.9 (ref 7–25)
CALCIUM SPEC-SCNC: 9.6 MG/DL (ref 8.6–10.5)
CHLORIDE SERPL-SCNC: 101 MMOL/L (ref 98–107)
CO2 SERPL-SCNC: 24.2 MMOL/L (ref 22–29)
CREAT BLD-MCNC: 0.82 MG/DL (ref 0.57–1)
DEPRECATED RDW RBC AUTO: 43.5 FL (ref 37–54)
EOSINOPHIL # BLD AUTO: 0.05 10*3/MM3 (ref 0–0.4)
EOSINOPHIL NFR BLD AUTO: 0.6 % (ref 0.3–6.2)
ERYTHROCYTE [DISTWIDTH] IN BLOOD BY AUTOMATED COUNT: 12.6 % (ref 12.3–15.4)
GFR SERPL CREATININE-BSD FRML MDRD: 76 ML/MIN/1.73
GLOBULIN UR ELPH-MCNC: 2.7 GM/DL
GLUCOSE BLD-MCNC: 106 MG/DL (ref 65–99)
HCG SERPL QL: NEGATIVE
HCT VFR BLD AUTO: 42.9 % (ref 34–46.6)
HGB BLD-MCNC: 14.1 G/DL (ref 12–15.9)
HOLD SPECIMEN: NORMAL
IMM GRANULOCYTES # BLD AUTO: 0.02 10*3/MM3 (ref 0–0.05)
IMM GRANULOCYTES NFR BLD AUTO: 0.2 % (ref 0–0.5)
LYMPHOCYTES # BLD AUTO: 2.26 10*3/MM3 (ref 0.7–3.1)
LYMPHOCYTES NFR BLD AUTO: 28.2 % (ref 19.6–45.3)
MCH RBC QN AUTO: 30.9 PG (ref 26.6–33)
MCHC RBC AUTO-ENTMCNC: 32.9 G/DL (ref 31.5–35.7)
MCV RBC AUTO: 93.9 FL (ref 79–97)
MONOCYTES # BLD AUTO: 0.49 10*3/MM3 (ref 0.1–0.9)
MONOCYTES NFR BLD AUTO: 6.1 % (ref 5–12)
NEUTROPHILS # BLD AUTO: 5.15 10*3/MM3 (ref 1.7–7)
NEUTROPHILS NFR BLD AUTO: 64.4 % (ref 42.7–76)
NRBC BLD AUTO-RTO: 0 /100 WBC (ref 0–0.2)
NT-PROBNP SERPL-MCNC: 27.5 PG/ML (ref 5–450)
PLATELET # BLD AUTO: 274 10*3/MM3 (ref 140–450)
PMV BLD AUTO: 11.2 FL (ref 6–12)
POTASSIUM BLD-SCNC: 4 MMOL/L (ref 3.5–5.2)
PROT SERPL-MCNC: 7.5 G/DL (ref 6–8.5)
RBC # BLD AUTO: 4.57 10*6/MM3 (ref 3.77–5.28)
SODIUM BLD-SCNC: 141 MMOL/L (ref 136–145)
TROPONIN T SERPL-MCNC: <0.01 NG/ML (ref 0–0.03)
WBC NRBC COR # BLD: 8.01 10*3/MM3 (ref 3.4–10.8)
WHOLE BLOOD HOLD SPECIMEN: NORMAL
WHOLE BLOOD HOLD SPECIMEN: NORMAL

## 2020-01-05 PROCEDURE — 80053 COMPREHEN METABOLIC PANEL: CPT | Performed by: EMERGENCY MEDICINE

## 2020-01-05 PROCEDURE — 71046 X-RAY EXAM CHEST 2 VIEWS: CPT

## 2020-01-05 PROCEDURE — 99283 EMERGENCY DEPT VISIT LOW MDM: CPT

## 2020-01-05 PROCEDURE — 85025 COMPLETE CBC W/AUTO DIFF WBC: CPT | Performed by: EMERGENCY MEDICINE

## 2020-01-05 PROCEDURE — 84484 ASSAY OF TROPONIN QUANT: CPT | Performed by: EMERGENCY MEDICINE

## 2020-01-05 PROCEDURE — 83880 ASSAY OF NATRIURETIC PEPTIDE: CPT | Performed by: EMERGENCY MEDICINE

## 2020-01-05 PROCEDURE — 84703 CHORIONIC GONADOTROPIN ASSAY: CPT | Performed by: EMERGENCY MEDICINE

## 2020-01-05 PROCEDURE — 93005 ELECTROCARDIOGRAM TRACING: CPT | Performed by: EMERGENCY MEDICINE

## 2020-01-05 RX ORDER — SODIUM CHLORIDE 0.9 % (FLUSH) 0.9 %
10 SYRINGE (ML) INJECTION AS NEEDED
Status: DISCONTINUED | OUTPATIENT
Start: 2020-01-05 | End: 2020-01-05 | Stop reason: HOSPADM

## 2020-01-05 RX ORDER — ALBUTEROL SULFATE 90 UG/1
2 AEROSOL, METERED RESPIRATORY (INHALATION) EVERY 6 HOURS PRN
Qty: 1 INHALER | Refills: 0 | Status: ON HOLD | OUTPATIENT
Start: 2020-01-05 | End: 2020-11-16

## 2020-01-05 RX ORDER — PREDNISONE 20 MG/1
TABLET ORAL
Qty: 10 TABLET | Refills: 0 | Status: SHIPPED | OUTPATIENT
Start: 2020-01-05 | End: 2020-11-10

## 2020-01-05 NOTE — ED PROVIDER NOTES
Subjective   History of Present Illness    Chief Complaint: Shortness of breath  History of Present Illness: 44-year-old female ex-smoker with reported history of early COPD, not requiring any maintenance medications presents with shortness of breath which began overnight.  States that it kept her up last night.   was sick recently with upper respiratory infection.  Denies any chest pain hemoptysis syncope or sputum production  Onset: Last night  Duration: Has improved today  Exacerbating / Alleviating factors: None  Associated symptoms: None      Nurses Notes reviewed and agree, including vitals, allergies, social history and prior medical history.     REVIEW OF SYSTEMS: All systems reviewed and not pertinent unless noted.    Positive for: Shortness of breath    Negative for: Hemoptysis syncope chest pain fever cough  Review of Systems    Past Medical History:   Diagnosis Date   • Abnormal ECG    • Bipolar affective (CMS/HCC)    • Current every day smoker    • Palpitations        No Known Allergies    History reviewed. No pertinent surgical history.    Family History   Problem Relation Age of Onset   • Hyperlipidemia Mother    • Hyperlipidemia Father    • Hypertension Father    • Stroke Brother    • Heart attack Maternal Grandfather    • Heart disease Maternal Grandfather    • Heart disease Paternal Grandfather    • Heart attack Paternal Grandfather    • Hyperlipidemia Brother    • Alcohol abuse Other    • Diabetes Other    • Hypertension Other    • Stroke Other        Social History     Socioeconomic History   • Marital status:      Spouse name: Not on file   • Number of children: Not on file   • Years of education: Not on file   • Highest education level: Not on file   Tobacco Use   • Smoking status: Former Smoker     Packs/day: 1.00     Types: Cigarettes   • Smokeless tobacco: Never Used   Substance and Sexual Activity   • Alcohol use: Yes     Comment: rare   • Drug use: No   • Sexual activity:  Defer           Objective   Physical Exam      GENERAL APPEARANCE: Well developed, well nourished, in no acute distress.  VITAL SIGNS: per nursing, reviewed and noted  SKIN: no rashes, ulcerations or petechiae.  Head: Normocephalic, atraumatic.   EYES: perrla. EOMI.  ENT:  TM clear, posterior pharynx patent.  LUNGS: Faint expiratory wheeze.  No increased work of breathing.. No retractions.   CARDIOVASCULAR:  regular rate and rhythm, no murmurs.  Good Peripheral pulses.  ABDOMEN: Soft, nontender, normal bowel sounds. No hernia. No ascites.  MUSCULOSKELETAL:  No tenderness. Full ROM. Strength and tone normal.  NEUROLOGIC: Alert, oriented x 3. No gross deficits.   NECK: Supple, symmetric. No tenderness, no masses. Full ROM  Back: full rom, no paraspinal spasm. No CVA tenderness.   PSYCH: appropriate affect,      Procedures       No attending provider procedures were performed    ED Course  ED Course as of Jan 05 1241   Sun Jan 05, 2020   1238 Troponin T: <0.010 [PF]   1238 HCG Qualitative: Negative [PF]   1238 proBNP: 27.5 [PF]   1238 Glucose(!): 106 [PF]   1238 BUN: 13 [PF]   1238 Creatinine: 0.82 [PF]   1238 Sodium: 141 [PF]   1238 Potassium: 4.0 [PF]   1238 Chloride: 101 [PF]   1238 CO2: 24.2 [PF]   1238 ALT (SGPT): 21 [PF]   1238 AST (SGOT): 17 [PF]   1238 Alkaline Phosphatase: 114 [PF]   1238 Total Bilirubin(!): <0.2 [PF]   1238 WBC: 8.01 [PF]   1238 Hemoglobin: 14.1 [PF]   1238 Hematocrit: 42.9 [PF]   1238 Platelets: 274 [PF]      ED Course User Index  [PF] Lio Monteiro, DO         EKG interpreted by me reveals sinus rhythm rate 96.  No ectopy ischemic changes.  Normal EKG.      FINDINGS: The heart is normal in size. The mediastinum is unremarkable.  The lungs are clear. There is no pneumothorax.  There are no acute  osseous abnormalities.     IMPRESSION:  No acute cardiopulmonary process.                                      MDM  Patient appears well, nontoxic no acute findings in regards to ACS work-up for  pulmonary findings.  No arrhythmia.  Will add prednisone and rescue inhaler.  Outpatient follow-up and return precautions were discussed.  Final diagnoses:   Dyspnea, unspecified type            Lio Monteiro,   01/05/20 124

## 2020-10-31 ENCOUNTER — APPOINTMENT (OUTPATIENT)
Dept: CT IMAGING | Facility: HOSPITAL | Age: 45
End: 2020-10-31

## 2020-10-31 ENCOUNTER — HOSPITAL ENCOUNTER (EMERGENCY)
Facility: HOSPITAL | Age: 45
Discharge: HOME | End: 2020-10-31
Attending: EMERGENCY MEDICINE | Admitting: EMERGENCY MEDICINE

## 2020-10-31 VITALS
OXYGEN SATURATION: 100 % | SYSTOLIC BLOOD PRESSURE: 140 MMHG | HEART RATE: 98 BPM | DIASTOLIC BLOOD PRESSURE: 50 MMHG | HEIGHT: 64 IN | BODY MASS INDEX: 26.46 KG/M2 | TEMPERATURE: 98.6 F | WEIGHT: 155 LBS | RESPIRATION RATE: 16 BRPM

## 2020-10-31 DIAGNOSIS — K52.9 COLITIS: Primary | ICD-10-CM

## 2020-10-31 LAB
ABO GROUP BLD: NORMAL
ALBUMIN SERPL-MCNC: 3.8 G/DL (ref 3.5–5.2)
ALBUMIN/GLOB SERPL: 1.2 G/DL
ALP SERPL-CCNC: 95 U/L (ref 39–117)
ALT SERPL W P-5'-P-CCNC: 11 U/L (ref 1–33)
ANION GAP SERPL CALCULATED.3IONS-SCNC: 11.5 MMOL/L (ref 5–15)
AST SERPL-CCNC: 16 U/L (ref 1–32)
BASOPHILS # BLD AUTO: 0.05 10*3/MM3 (ref 0–0.2)
BASOPHILS NFR BLD AUTO: 0.5 % (ref 0–1.5)
BILIRUB SERPL-MCNC: 0.2 MG/DL (ref 0–1.2)
BLD GP AB SCN SERPL QL: NEGATIVE
BUN SERPL-MCNC: 7 MG/DL (ref 6–20)
BUN/CREAT SERPL: 9.9 (ref 7–25)
CALCIUM SPEC-SCNC: 9.2 MG/DL (ref 8.6–10.5)
CHLORIDE SERPL-SCNC: 103 MMOL/L (ref 98–107)
CO2 SERPL-SCNC: 23.5 MMOL/L (ref 22–29)
CREAT SERPL-MCNC: 0.71 MG/DL (ref 0.57–1)
DEPRECATED RDW RBC AUTO: 44.1 FL (ref 37–54)
EOSINOPHIL # BLD AUTO: 0.48 10*3/MM3 (ref 0–0.4)
EOSINOPHIL NFR BLD AUTO: 4.5 % (ref 0.3–6.2)
ERYTHROCYTE [DISTWIDTH] IN BLOOD BY AUTOMATED COUNT: 12.9 % (ref 12.3–15.4)
GFR SERPL CREATININE-BSD FRML MDRD: 90 ML/MIN/1.73
GLOBULIN UR ELPH-MCNC: 3.2 GM/DL
GLUCOSE SERPL-MCNC: 100 MG/DL (ref 65–99)
HCT VFR BLD AUTO: 38.4 % (ref 34–46.6)
HGB BLD-MCNC: 12.4 G/DL (ref 12–15.9)
HOLD SPECIMEN: NORMAL
HOLD SPECIMEN: NORMAL
IMM GRANULOCYTES # BLD AUTO: 0.05 10*3/MM3 (ref 0–0.05)
IMM GRANULOCYTES NFR BLD AUTO: 0.5 % (ref 0–0.5)
INR PPP: 1.05 (ref 0.9–1.1)
LYMPHOCYTES # BLD AUTO: 1.59 10*3/MM3 (ref 0.7–3.1)
LYMPHOCYTES NFR BLD AUTO: 14.8 % (ref 19.6–45.3)
MCH RBC QN AUTO: 30.2 PG (ref 26.6–33)
MCHC RBC AUTO-ENTMCNC: 32.3 G/DL (ref 31.5–35.7)
MCV RBC AUTO: 93.4 FL (ref 79–97)
MONOCYTES # BLD AUTO: 1.03 10*3/MM3 (ref 0.1–0.9)
MONOCYTES NFR BLD AUTO: 9.6 % (ref 5–12)
NEUTROPHILS NFR BLD AUTO: 7.54 10*3/MM3 (ref 1.7–7)
NEUTROPHILS NFR BLD AUTO: 70.1 % (ref 42.7–76)
NRBC BLD AUTO-RTO: 0 /100 WBC (ref 0–0.2)
PLATELET # BLD AUTO: 305 10*3/MM3 (ref 140–450)
PMV BLD AUTO: 10.7 FL (ref 6–12)
POTASSIUM SERPL-SCNC: 4 MMOL/L (ref 3.5–5.2)
PROT SERPL-MCNC: 7 G/DL (ref 6–8.5)
PROTHROMBIN TIME: 14.1 SECONDS (ref 12–15.1)
RBC # BLD AUTO: 4.11 10*6/MM3 (ref 3.77–5.28)
RH BLD: POSITIVE
SODIUM SERPL-SCNC: 138 MMOL/L (ref 136–145)
T&S EXPIRATION DATE: NORMAL
WBC # BLD AUTO: 10.74 10*3/MM3 (ref 3.4–10.8)
WHOLE BLOOD HOLD SPECIMEN: NORMAL
WHOLE BLOOD HOLD SPECIMEN: NORMAL

## 2020-10-31 PROCEDURE — 80053 COMPREHEN METABOLIC PANEL: CPT | Performed by: PHYSICIAN ASSISTANT

## 2020-10-31 PROCEDURE — 86850 RBC ANTIBODY SCREEN: CPT | Performed by: PHYSICIAN ASSISTANT

## 2020-10-31 PROCEDURE — 86900 BLOOD TYPING SEROLOGIC ABO: CPT | Performed by: PHYSICIAN ASSISTANT

## 2020-10-31 PROCEDURE — 85610 PROTHROMBIN TIME: CPT | Performed by: PHYSICIAN ASSISTANT

## 2020-10-31 PROCEDURE — 74177 CT ABD & PELVIS W/CONTRAST: CPT

## 2020-10-31 PROCEDURE — 86901 BLOOD TYPING SEROLOGIC RH(D): CPT | Performed by: PHYSICIAN ASSISTANT

## 2020-10-31 PROCEDURE — 85025 COMPLETE CBC W/AUTO DIFF WBC: CPT | Performed by: PHYSICIAN ASSISTANT

## 2020-10-31 PROCEDURE — 25010000002 IOPAMIDOL 61 % SOLUTION: Performed by: EMERGENCY MEDICINE

## 2020-10-31 PROCEDURE — 99283 EMERGENCY DEPT VISIT LOW MDM: CPT

## 2020-10-31 RX ORDER — SODIUM CHLORIDE 0.9 % (FLUSH) 0.9 %
10 SYRINGE (ML) INJECTION AS NEEDED
Status: DISCONTINUED | OUTPATIENT
Start: 2020-10-31 | End: 2020-10-31 | Stop reason: HOSPADM

## 2020-10-31 RX ORDER — AMOXICILLIN AND CLAVULANATE POTASSIUM 875; 125 MG/1; MG/1
1 TABLET, FILM COATED ORAL EVERY 12 HOURS
Qty: 20 TABLET | Refills: 0 | Status: SHIPPED | OUTPATIENT
Start: 2020-10-31 | End: 2020-11-10 | Stop reason: SINTOL

## 2020-10-31 RX ADMIN — IOPAMIDOL 80 ML: 612 INJECTION, SOLUTION INTRAVENOUS at 10:55

## 2020-11-10 ENCOUNTER — OFFICE VISIT (OUTPATIENT)
Dept: GASTROENTEROLOGY | Facility: CLINIC | Age: 45
End: 2020-11-10

## 2020-11-10 VITALS
TEMPERATURE: 98.6 F | HEART RATE: 96 BPM | WEIGHT: 153 LBS | DIASTOLIC BLOOD PRESSURE: 62 MMHG | RESPIRATION RATE: 16 BRPM | HEIGHT: 64 IN | SYSTOLIC BLOOD PRESSURE: 103 MMHG | BODY MASS INDEX: 26.12 KG/M2

## 2020-11-10 DIAGNOSIS — Z01.812 PRE-PROCEDURE LAB EXAM: Primary | ICD-10-CM

## 2020-11-10 DIAGNOSIS — R10.32 LEFT LOWER QUADRANT ABDOMINAL PAIN: ICD-10-CM

## 2020-11-10 DIAGNOSIS — R19.7 BLOODY DIARRHEA: Primary | ICD-10-CM

## 2020-11-10 DIAGNOSIS — K52.9 COLITIS: ICD-10-CM

## 2020-11-10 PROCEDURE — 99204 OFFICE O/P NEW MOD 45 MIN: CPT | Performed by: INTERNAL MEDICINE

## 2020-11-10 RX ORDER — BISACODYL 5 MG/1
20 TABLET, DELAYED RELEASE ORAL ONCE
Qty: 4 TABLET | Refills: 0 | Status: SHIPPED | OUTPATIENT
Start: 2020-11-10 | End: 2020-11-10

## 2020-11-10 RX ORDER — POLYETHYLENE GLYCOL 3350 17 G/17G
238 POWDER, FOR SOLUTION ORAL ONCE
Qty: 238 G | Refills: 0 | Status: SHIPPED | OUTPATIENT
Start: 2020-11-10 | End: 2020-11-10

## 2020-11-10 RX ORDER — SODIUM CHLORIDE 9 MG/ML
70 INJECTION, SOLUTION INTRAVENOUS CONTINUOUS PRN
Status: CANCELLED | OUTPATIENT
Start: 2020-11-16

## 2020-11-10 NOTE — PROGRESS NOTES
New Patient Consult      Date: 11/10/2020   Patient Name: Stacy Guardado  MRN: [id number]  : 1975     Referring Physician: Shayna Swartz PA    Chief Complaint   Patient presents with   • Rectal Bleeding   • Colitis       History of Present Illness: Stacy Guardado is a 45 y.o. female who is here today to establish care with Gastroenterology for evaluation of recent diarrhea with blood.   She states that she noted to have red blood in stool three weeks ago and that got worse with loose stool.She also develeloped LLQ abdominal pain. Loose stool 5-7 times daily. Deny any fever chills. Patient was in emergency room 2 weeks ago with complaints of diarrhea abdominal pain and blood in the stool.  She had basic lab work done which revealed borderline leukocytosis.  She deny any prior constipation. She also had a CT scan of the abdomen pelvis done which revealed thickening in the descending colon the sigmoid suggesting colitis. She was given Augmentin for ten days.    Weight is stable. Pt denies nausea vomiting or odynophagia or dysphagia. There is no history of acid reflux. There is no history of anemia. No prior history of EGD or colonoscopy. No family history of colon cancer or any GI malignancy, or IBD. No history of any abdominal surgery. Denies alcohol abuse or cigarette smoking.     Subjective      Past Medical History:   Past Medical History:   Diagnosis Date   • Abnormal ECG    • Bipolar affective (CMS/HCC)    • COPD (chronic obstructive pulmonary disease) (CMS/HCC)    • Current every day smoker    • Palpitations    • Tattoos        Past Surgical History:   Past Surgical History:   Procedure Laterality Date   • NO PAST SURGERIES      as of 11/10/2020       Family History:   Family History   Problem Relation Age of Onset   • Hyperlipidemia Mother    • Hyperlipidemia Father    • Hypertension Father    • Stroke Brother    • Heart attack Maternal Grandfather    • Heart disease Maternal  Grandfather    • Heart disease Paternal Grandfather    • Heart attack Paternal Grandfather    • Hyperlipidemia Brother    • Alcohol abuse Other    • Diabetes Other    • Hypertension Other    • Stroke Other    • Colon cancer Neg Hx    • Cirrhosis Neg Hx    • Liver cancer Neg Hx    • Liver disease Neg Hx        Social History:   Social History     Socioeconomic History   • Marital status:      Spouse name: Not on file   • Number of children: Not on file   • Years of education: Not on file   • Highest education level: Not on file   Tobacco Use   • Smoking status: Former Smoker     Packs/day: 1.00     Types: Cigarettes     Quit date: 2019     Years since quittin.5   • Smokeless tobacco: Never Used   Substance and Sexual Activity   • Alcohol use: Yes     Comment: socially   • Drug use: No   • Sexual activity: Defer         Current Outpatient Medications:   •  Acetaminophen (TYLENOL PO), Take  by mouth Daily., Disp: , Rfl:   •  albuterol sulfate  (90 Base) MCG/ACT inhaler, Inhale 2 puffs Every 6 (Six) Hours As Needed for Wheezing., Disp: 1 inhaler, Rfl: 0  •  bisacodyl (DULCOLAX) 5 MG EC tablet, Take 4 tablets by mouth 1 (One) Time for 1 dose. Please see prep instructions from office., Disp: 4 tablet, Rfl: 0  •  fluconazole (DIFLUCAN) 150 MG tablet, Take one tablet at onset of symptoms, then repeat 3 days later., Disp: 2 tablet, Rfl: 0  •  polyethylene glycol (MiraLax) 17 GM/SCOOP powder, Take 238 g by mouth 1 (One) Time for 1 dose. Please see prep instructions from office., Disp: 238 g, Rfl: 0    Allergies   Allergen Reactions   • Augmentin [Amoxicillin-Pot Clavulanate] Diarrhea and GI Intolerance       Review of Systems:   Review of Systems   Constitutional: Positive for unexpected weight loss. Negative for appetite change, fatigue and fever.   HENT: Negative for trouble swallowing.    Respiratory: Negative for cough, shortness of breath and wheezing.    Cardiovascular: Positive for palpitations.  "Negative for chest pain and leg swelling.   Gastrointestinal: Positive for abdominal pain, anal bleeding, blood in stool, diarrhea, nausea and rectal pain. Negative for abdominal distention, constipation, vomiting, GERD and indigestion.   Genitourinary: Negative for dysuria, frequency and hematuria.   Musculoskeletal: Negative for back pain and joint swelling.   Skin: Negative for color change, rash and skin lesions.   Neurological: Negative for dizziness, syncope, speech difficulty, weakness, headache and memory problem.   Hematological: Negative for adenopathy. Does not bruise/bleed easily.   Psychiatric/Behavioral: Negative for agitation, behavioral problems, suicidal ideas and depressed mood.       The following portions of the patient's history were reviewed and updated as appropriate: allergies, current medications, past family history, past medical history, past social history, past surgical history and problem list.    Objective     Physical Exam:  Vital Signs:   Vitals:    11/10/20 1644   BP: 103/62   Pulse: 96   Resp: 16   Temp: 98.6 °F (37 °C)   Weight: 69.4 kg (153 lb)   Height: 162.6 cm (64\")       Physical Exam  Vitals signs and nursing note reviewed.   Constitutional:       Appearance: Normal appearance. She is well-developed.   HENT:      Head: Normocephalic and atraumatic.      Right Ear: External ear normal.      Left Ear: External ear normal.      Mouth/Throat:      Mouth: Mucous membranes are moist.      Pharynx: Oropharynx is clear.   Eyes:      Conjunctiva/sclera: Conjunctivae normal.      Pupils: Pupils are equal, round, and reactive to light.   Neck:      Musculoskeletal: Normal range of motion and neck supple.      Thyroid: No thyromegaly.      Trachea: No tracheal deviation.   Cardiovascular:      Rate and Rhythm: Normal rate and regular rhythm.      Heart sounds: No murmur.   Pulmonary:      Effort: Pulmonary effort is normal. No respiratory distress.      Breath sounds: Normal breath " sounds.   Abdominal:      General: Bowel sounds are normal. There is no distension.      Palpations: Abdomen is soft. There is no mass.      Tenderness: There is no abdominal tenderness.      Hernia: No hernia is present.   Musculoskeletal: Normal range of motion.   Skin:     General: Skin is warm and dry.   Neurological:      General: No focal deficit present.      Mental Status: She is alert and oriented to person, place, and time.      Cranial Nerves: No cranial nerve deficit.      Sensory: No sensory deficit.   Psychiatric:         Mood and Affect: Mood normal.         Behavior: Behavior normal.         Thought Content: Thought content normal.         Judgment: Judgment normal.         Results Review:   I have reviewed the patient's new clinical and imaging results and agree with the interpretation.     Admission on 10/31/2020, Discharged on 10/31/2020   Component Date Value Ref Range Status   • Glucose 10/31/2020 100* 65 - 99 mg/dL Final   • BUN 10/31/2020 7  6 - 20 mg/dL Final   • Creatinine 10/31/2020 0.71  0.57 - 1.00 mg/dL Final   • Sodium 10/31/2020 138  136 - 145 mmol/L Final   • Potassium 10/31/2020 4.0  3.5 - 5.2 mmol/L Final   • Chloride 10/31/2020 103  98 - 107 mmol/L Final   • CO2 10/31/2020 23.5  22.0 - 29.0 mmol/L Final   • Calcium 10/31/2020 9.2  8.6 - 10.5 mg/dL Final   • Total Protein 10/31/2020 7.0  6.0 - 8.5 g/dL Final   • Albumin 10/31/2020 3.80  3.50 - 5.20 g/dL Final   • ALT (SGPT) 10/31/2020 11  1 - 33 U/L Final   • AST (SGOT) 10/31/2020 16  1 - 32 U/L Final   • Alkaline Phosphatase 10/31/2020 95  39 - 117 U/L Final   • Total Bilirubin 10/31/2020 0.2  0.0 - 1.2 mg/dL Final   • eGFR Non African Amer 10/31/2020 90  >60 mL/min/1.73 Final   • Globulin 10/31/2020 3.2  gm/dL Final   • A/G Ratio 10/31/2020 1.2  g/dL Final   • BUN/Creatinine Ratio 10/31/2020 9.9  7.0 - 25.0 Final   • Anion Gap 10/31/2020 11.5  5.0 - 15.0 mmol/L Final   • Protime 10/31/2020 14.1  12.0 - 15.1 Seconds Final   • INR  10/31/2020 1.05  0.90 - 1.10 Final   • ABO Type 10/31/2020 A   Final   • RH type 10/31/2020 Positive   Final   • Antibody Screen 10/31/2020 Negative   Final   • T&S Expiration Date 10/31/2020 11/3/2020 11:59:59 PM   Final   • Extra Tube 10/31/2020 hold for add-on   Final    Auto resulted   • Extra Tube 10/31/2020 Hold for add-ons.   Final    Auto resulted.   • Extra Tube 10/31/2020 hold for add-on   Final    Auto resulted   • Extra Tube 10/31/2020 Hold for add-ons.   Final    Auto resulted.   • WBC 10/31/2020 10.74  3.40 - 10.80 10*3/mm3 Final   • RBC 10/31/2020 4.11  3.77 - 5.28 10*6/mm3 Final   • Hemoglobin 10/31/2020 12.4  12.0 - 15.9 g/dL Final   • Hematocrit 10/31/2020 38.4  34.0 - 46.6 % Final   • MCV 10/31/2020 93.4  79.0 - 97.0 fL Final   • MCH 10/31/2020 30.2  26.6 - 33.0 pg Final   • MCHC 10/31/2020 32.3  31.5 - 35.7 g/dL Final   • RDW 10/31/2020 12.9  12.3 - 15.4 % Final   • RDW-SD 10/31/2020 44.1  37.0 - 54.0 fl Final   • MPV 10/31/2020 10.7  6.0 - 12.0 fL Final   • Platelets 10/31/2020 305  140 - 450 10*3/mm3 Final   • Neutrophil % 10/31/2020 70.1  42.7 - 76.0 % Final   • Lymphocyte % 10/31/2020 14.8* 19.6 - 45.3 % Final   • Monocyte % 10/31/2020 9.6  5.0 - 12.0 % Final   • Eosinophil % 10/31/2020 4.5  0.3 - 6.2 % Final   • Basophil % 10/31/2020 0.5  0.0 - 1.5 % Final   • Immature Grans % 10/31/2020 0.5  0.0 - 0.5 % Final   • Neutrophils, Absolute 10/31/2020 7.54* 1.70 - 7.00 10*3/mm3 Final   • Lymphocytes, Absolute 10/31/2020 1.59  0.70 - 3.10 10*3/mm3 Final   • Monocytes, Absolute 10/31/2020 1.03* 0.10 - 0.90 10*3/mm3 Final   • Eosinophils, Absolute 10/31/2020 0.48* 0.00 - 0.40 10*3/mm3 Final   • Basophils, Absolute 10/31/2020 0.05  0.00 - 0.20 10*3/mm3 Final   • Immature Grans, Absolute 10/31/2020 0.05  0.00 - 0.05 10*3/mm3 Final   • nRBC 10/31/2020 0.0  0.0 - 0.2 /100 WBC Final      Ct Abdomen Pelvis With Contrast    Result Date: 10/31/2020  Mucosal thickening is noted in the sigmoid colon and  descending colon which may represent an infectious or inflammatory process. Endoscopic follow-up recommended.    This study was performed with techniques to keep radiation doses as low as reasonably achievable (ALARA). Individualized dose reduction techniques using automated exposure control or adjustment of mA and/or kV according to the patient size were employed.  This report was finalized on 10/31/2020 11:15 AM by Aakash Valdovinos DO.      Assessment / Plan      Assessment & Plan:  1. Left lower quadrant abdominal pain  2. Bloody diarrhea  3. Colitis  Acute episode of bloody diarrhea, which has progressed now to chronic.  Symptoms more suggestive infective colitis however IBD cannot be ruled out.   She states that she finished 10-day course of Augmentin given at the ED but she still has ongoing bloody diarrhea with minimal left lower quadrant abdominal pain.   Her basic lab work done in the emergency room 2 weeks ago did not reveal any significant abnormality except borderline leukocytosis.  CT of the abdomen pelvis done revealed a thickened descending colon and the sigmoid colon suggesting colitis.  No family history of any IBD.   Given her persistent symptoms we will schedule her for colonoscopy for further evaluation  We will also get a stool for C. difficile and the stool for GI panel    The indications, technique, alternatives and potential risk and complications were discussed with the patient including but not limited to bleeding, bowel perforations, missing lesions and anesthetic complications. The patient understands and wishes to proceed with the procedure and has given their verbal consent. Written patient education information was given to the patient.   The patient will call if they have further questions before procedure.       - Clostridium Difficile Toxin, PCR - Stool, Per Rectum; Future  - Gastrointestinal Panel, PCR ([facility]) - Stool, Per Rectum; Future  - Case Request; Standing  - Implement  Anesthesia Orders Day of Procedure; Standing  - Obtain Informed Consent; Standing  - Verify Bowel Prep Was Successful; Standing  - Oxygen Therapy- Nasal Cannula; 2 LPM; Titrate for SPO2: equal to or greater than, 90%; Standing  - POC Glucose Once; Standing  - sodium chloride 0.9 % infusion  - Case Request        Follow Up:   Return for Follow Up after procedure.    Susan Brizuela MD  [facility]  11/10/2020  18:02 EST    Please note that portions of this note may have been completed with a voice recognition program.

## 2020-11-11 PROBLEM — R19.7 BLOODY DIARRHEA: Status: ACTIVE | Noted: 2020-11-11

## 2020-11-11 PROCEDURE — 87493 C DIFF AMPLIFIED PROBE: CPT

## 2020-11-11 PROCEDURE — 0097U HC BIOFIRE FILMARRAY GI PANEL: CPT

## 2020-11-12 ENCOUNTER — LAB (OUTPATIENT)
Dept: LAB | Facility: HOSPITAL | Age: 45
End: 2020-11-12

## 2020-11-12 DIAGNOSIS — Z01.812 PRE-PROCEDURE LAB EXAM: ICD-10-CM

## 2020-11-12 PROCEDURE — C9803 HOPD COVID-19 SPEC COLLECT: HCPCS

## 2020-11-12 PROCEDURE — U0004 COV-19 TEST NON-CDC HGH THRU: HCPCS

## 2020-11-13 LAB — SARS-COV-2 RNA RESP QL NAA+PROBE: NOT DETECTED

## 2020-11-15 ENCOUNTER — APPOINTMENT (OUTPATIENT)
Dept: CT IMAGING | Facility: HOSPITAL | Age: 45
End: 2020-11-15

## 2020-11-15 ENCOUNTER — HOSPITAL ENCOUNTER (INPATIENT)
Facility: HOSPITAL | Age: 45
LOS: 4 days | Discharge: HOME | End: 2020-11-19
Attending: STUDENT IN AN ORGANIZED HEALTH CARE EDUCATION/TRAINING PROGRAM | Admitting: INTERNAL MEDICINE

## 2020-11-15 ENCOUNTER — APPOINTMENT (OUTPATIENT)
Dept: GENERAL RADIOLOGY | Facility: HOSPITAL | Age: 45
End: 2020-11-15

## 2020-11-15 DIAGNOSIS — E87.6 HYPOKALEMIA: ICD-10-CM

## 2020-11-15 DIAGNOSIS — R50.9 FEVER IN ADULT: ICD-10-CM

## 2020-11-15 DIAGNOSIS — R19.7 BLOODY DIARRHEA: ICD-10-CM

## 2020-11-15 DIAGNOSIS — K52.9 COLITIS: ICD-10-CM

## 2020-11-15 DIAGNOSIS — R11.2 NON-INTRACTABLE VOMITING WITH NAUSEA, UNSPECIFIED VOMITING TYPE: ICD-10-CM

## 2020-11-15 DIAGNOSIS — A41.9 SEPSIS WITHOUT ACUTE ORGAN DYSFUNCTION, DUE TO UNSPECIFIED ORGANISM (HCC): Primary | ICD-10-CM

## 2020-11-15 LAB
ALBUMIN SERPL-MCNC: 3.1 G/DL (ref 3.5–5.2)
ALBUMIN/GLOB SERPL: 0.8 G/DL
ALP SERPL-CCNC: 86 U/L (ref 39–117)
ALT SERPL W P-5'-P-CCNC: 69 U/L (ref 1–33)
ANION GAP SERPL CALCULATED.3IONS-SCNC: 13.2 MMOL/L (ref 5–15)
AST SERPL-CCNC: 69 U/L (ref 1–32)
BACTERIA UR QL AUTO: ABNORMAL /HPF
BASOPHILS # BLD AUTO: 0.06 10*3/MM3 (ref 0–0.2)
BASOPHILS NFR BLD AUTO: 0.5 % (ref 0–1.5)
BILIRUB SERPL-MCNC: 0.2 MG/DL (ref 0–1.2)
BILIRUB UR QL STRIP: NEGATIVE
BUN SERPL-MCNC: 6 MG/DL (ref 6–20)
BUN/CREAT SERPL: 8.8 (ref 7–25)
CALCIUM SPEC-SCNC: 8.6 MG/DL (ref 8.6–10.5)
CHLORIDE SERPL-SCNC: 90 MMOL/L (ref 98–107)
CLARITY UR: CLEAR
CO2 SERPL-SCNC: 25.8 MMOL/L (ref 22–29)
COLOR UR: YELLOW
CREAT SERPL-MCNC: 0.68 MG/DL (ref 0.57–1)
D-LACTATE SERPL-SCNC: 1.7 MMOL/L (ref 0.5–2)
DEPRECATED RDW RBC AUTO: 43.6 FL (ref 37–54)
EOSINOPHIL # BLD AUTO: 0.02 10*3/MM3 (ref 0–0.4)
EOSINOPHIL NFR BLD AUTO: 0.2 % (ref 0.3–6.2)
ERYTHROCYTE [DISTWIDTH] IN BLOOD BY AUTOMATED COUNT: 13.6 % (ref 12.3–15.4)
FLUAV AG NPH QL: NEGATIVE
FLUBV AG NPH QL IA: NEGATIVE
GFR SERPL CREATININE-BSD FRML MDRD: 94 ML/MIN/1.73
GLOBULIN UR ELPH-MCNC: 3.7 GM/DL
GLUCOSE SERPL-MCNC: 125 MG/DL (ref 65–99)
GLUCOSE UR STRIP-MCNC: NEGATIVE MG/DL
HCT VFR BLD AUTO: 31.8 % (ref 34–46.6)
HGB BLD-MCNC: 10.5 G/DL (ref 12–15.9)
HGB UR QL STRIP.AUTO: NEGATIVE
HOLD SPECIMEN: NORMAL
HOLD SPECIMEN: NORMAL
HYALINE CASTS UR QL AUTO: ABNORMAL /LPF
IMM GRANULOCYTES # BLD AUTO: 0.07 10*3/MM3 (ref 0–0.05)
IMM GRANULOCYTES NFR BLD AUTO: 0.6 % (ref 0–0.5)
KETONES UR QL STRIP: ABNORMAL
LEUKOCYTE ESTERASE UR QL STRIP.AUTO: NEGATIVE
LIPASE SERPL-CCNC: 24 U/L (ref 13–60)
LYMPHOCYTES # BLD AUTO: 1.16 10*3/MM3 (ref 0.7–3.1)
LYMPHOCYTES NFR BLD AUTO: 10.1 % (ref 19.6–45.3)
MAGNESIUM SERPL-MCNC: 2.1 MG/DL (ref 1.6–2.6)
MCH RBC QN AUTO: 28.8 PG (ref 26.6–33)
MCHC RBC AUTO-ENTMCNC: 33 G/DL (ref 31.5–35.7)
MCV RBC AUTO: 87.4 FL (ref 79–97)
MONOCYTES # BLD AUTO: 2 10*3/MM3 (ref 0.1–0.9)
MONOCYTES NFR BLD AUTO: 17.4 % (ref 5–12)
NEUTROPHILS NFR BLD AUTO: 71.2 % (ref 42.7–76)
NEUTROPHILS NFR BLD AUTO: 8.17 10*3/MM3 (ref 1.7–7)
NITRITE UR QL STRIP: NEGATIVE
NRBC BLD AUTO-RTO: 0 /100 WBC (ref 0–0.2)
PH UR STRIP.AUTO: 6.5 [PH] (ref 5–8)
PLATELET # BLD AUTO: 577 10*3/MM3 (ref 140–450)
PMV BLD AUTO: 9.6 FL (ref 6–12)
POTASSIUM SERPL-SCNC: 2.9 MMOL/L (ref 3.5–5.2)
PROCALCITONIN SERPL-MCNC: 0.41 NG/ML (ref 0–0.25)
PROT SERPL-MCNC: 6.8 G/DL (ref 6–8.5)
PROT UR QL STRIP: ABNORMAL
RBC # BLD AUTO: 3.64 10*6/MM3 (ref 3.77–5.28)
RBC # UR: ABNORMAL /HPF
REF LAB TEST METHOD: ABNORMAL
SARS-COV-2 RNA PNL SPEC NAA+PROBE: NOT DETECTED
SODIUM SERPL-SCNC: 129 MMOL/L (ref 136–145)
SP GR UR STRIP: 1.02 (ref 1–1.03)
SQUAMOUS #/AREA URNS HPF: ABNORMAL /HPF
TROPONIN T SERPL-MCNC: <0.01 NG/ML (ref 0–0.03)
UROBILINOGEN UR QL STRIP: ABNORMAL
WBC # BLD AUTO: 11.48 10*3/MM3 (ref 3.4–10.8)
WBC UR QL AUTO: ABNORMAL /HPF
WHOLE BLOOD HOLD SPECIMEN: NORMAL
WHOLE BLOOD HOLD SPECIMEN: NORMAL

## 2020-11-15 PROCEDURE — 85025 COMPLETE CBC W/AUTO DIFF WBC: CPT | Performed by: STUDENT IN AN ORGANIZED HEALTH CARE EDUCATION/TRAINING PROGRAM

## 2020-11-15 PROCEDURE — 71045 X-RAY EXAM CHEST 1 VIEW: CPT

## 2020-11-15 PROCEDURE — 87804 INFLUENZA ASSAY W/OPTIC: CPT | Performed by: PHYSICIAN ASSISTANT

## 2020-11-15 PROCEDURE — 25010000002 IOPAMIDOL 61 % SOLUTION: Performed by: EMERGENCY MEDICINE

## 2020-11-15 PROCEDURE — 87635 SARS-COV-2 COVID-19 AMP PRB: CPT | Performed by: PHYSICIAN ASSISTANT

## 2020-11-15 PROCEDURE — 99284 EMERGENCY DEPT VISIT MOD MDM: CPT

## 2020-11-15 PROCEDURE — 84484 ASSAY OF TROPONIN QUANT: CPT | Performed by: PHYSICIAN ASSISTANT

## 2020-11-15 PROCEDURE — 83605 ASSAY OF LACTIC ACID: CPT | Performed by: PHYSICIAN ASSISTANT

## 2020-11-15 PROCEDURE — 93005 ELECTROCARDIOGRAM TRACING: CPT | Performed by: PHYSICIAN ASSISTANT

## 2020-11-15 PROCEDURE — 81001 URINALYSIS AUTO W/SCOPE: CPT | Performed by: PHYSICIAN ASSISTANT

## 2020-11-15 PROCEDURE — 84145 PROCALCITONIN (PCT): CPT | Performed by: PHYSICIAN ASSISTANT

## 2020-11-15 PROCEDURE — 83690 ASSAY OF LIPASE: CPT | Performed by: STUDENT IN AN ORGANIZED HEALTH CARE EDUCATION/TRAINING PROGRAM

## 2020-11-15 PROCEDURE — 25010000002 LEVOFLOXACIN PER 250 MG: Performed by: PHYSICIAN ASSISTANT

## 2020-11-15 PROCEDURE — 83735 ASSAY OF MAGNESIUM: CPT | Performed by: PHYSICIAN ASSISTANT

## 2020-11-15 PROCEDURE — 25010000002 ONDANSETRON PER 1 MG: Performed by: STUDENT IN AN ORGANIZED HEALTH CARE EDUCATION/TRAINING PROGRAM

## 2020-11-15 PROCEDURE — 25010000002 DROPERIDOL PER 5 MG: Performed by: PHYSICIAN ASSISTANT

## 2020-11-15 PROCEDURE — 74177 CT ABD & PELVIS W/CONTRAST: CPT

## 2020-11-15 PROCEDURE — 87040 BLOOD CULTURE FOR BACTERIA: CPT | Performed by: PHYSICIAN ASSISTANT

## 2020-11-15 PROCEDURE — 80053 COMPREHEN METABOLIC PANEL: CPT | Performed by: STUDENT IN AN ORGANIZED HEALTH CARE EDUCATION/TRAINING PROGRAM

## 2020-11-15 RX ORDER — POTASSIUM CHLORIDE 750 MG/1
40 CAPSULE, EXTENDED RELEASE ORAL ONCE
Status: COMPLETED | OUTPATIENT
Start: 2020-11-15 | End: 2020-11-15

## 2020-11-15 RX ORDER — SODIUM CHLORIDE 9 MG/ML
125 INJECTION, SOLUTION INTRAVENOUS CONTINUOUS
Status: DISCONTINUED | OUTPATIENT
Start: 2020-11-15 | End: 2020-11-17

## 2020-11-15 RX ORDER — DROPERIDOL 2.5 MG/ML
1.25 INJECTION, SOLUTION INTRAMUSCULAR; INTRAVENOUS ONCE
Status: COMPLETED | OUTPATIENT
Start: 2020-11-15 | End: 2020-11-15

## 2020-11-15 RX ORDER — ACETAMINOPHEN 325 MG/1
650 TABLET ORAL ONCE
Status: COMPLETED | OUTPATIENT
Start: 2020-11-15 | End: 2020-11-15

## 2020-11-15 RX ORDER — ONDANSETRON 2 MG/ML
4 INJECTION INTRAMUSCULAR; INTRAVENOUS ONCE
Status: COMPLETED | OUTPATIENT
Start: 2020-11-15 | End: 2020-11-15

## 2020-11-15 RX ORDER — LEVOFLOXACIN 5 MG/ML
750 INJECTION, SOLUTION INTRAVENOUS ONCE
Status: COMPLETED | OUTPATIENT
Start: 2020-11-15 | End: 2020-11-15

## 2020-11-15 RX ORDER — SODIUM CHLORIDE 0.9 % (FLUSH) 0.9 %
10 SYRINGE (ML) INJECTION AS NEEDED
Status: DISCONTINUED | OUTPATIENT
Start: 2020-11-15 | End: 2020-11-19 | Stop reason: HOSPADM

## 2020-11-15 RX ADMIN — DROPERIDOL 1.25 MG: 2.5 INJECTION, SOLUTION INTRAMUSCULAR; INTRAVENOUS at 21:48

## 2020-11-15 RX ADMIN — IOPAMIDOL 100 ML: 612 INJECTION, SOLUTION INTRAVENOUS at 23:11

## 2020-11-15 RX ADMIN — METRONIDAZOLE 500 MG: 500 INJECTION, SOLUTION INTRAVENOUS at 23:15

## 2020-11-15 RX ADMIN — SODIUM CHLORIDE 1000 ML: 9 INJECTION, SOLUTION INTRAVENOUS at 23:44

## 2020-11-15 RX ADMIN — LEVOFLOXACIN 750 MG: 5 INJECTION, SOLUTION INTRAVENOUS at 23:44

## 2020-11-15 RX ADMIN — SODIUM CHLORIDE 1000 ML: 9 INJECTION, SOLUTION INTRAVENOUS at 20:55

## 2020-11-15 RX ADMIN — POTASSIUM CHLORIDE 40 MEQ: 10 CAPSULE, COATED, EXTENDED RELEASE ORAL at 21:35

## 2020-11-15 RX ADMIN — ONDANSETRON 4 MG: 2 INJECTION, SOLUTION INTRAMUSCULAR; INTRAVENOUS at 20:54

## 2020-11-15 RX ADMIN — SODIUM CHLORIDE 1000 ML: 9 INJECTION, SOLUTION INTRAVENOUS at 21:35

## 2020-11-15 RX ADMIN — ACETAMINOPHEN 650 MG: 325 TABLET, FILM COATED ORAL at 21:48

## 2020-11-16 ENCOUNTER — ANESTHESIA EVENT (OUTPATIENT)
Dept: GASTROENTEROLOGY | Facility: HOSPITAL | Age: 45
End: 2020-11-16

## 2020-11-16 ENCOUNTER — ANESTHESIA (OUTPATIENT)
Dept: GASTROENTEROLOGY | Facility: HOSPITAL | Age: 45
End: 2020-11-16

## 2020-11-16 PROBLEM — Z87.891 FORMER SMOKER: Status: ACTIVE | Noted: 2020-11-16

## 2020-11-16 PROBLEM — K52.9 COLITIS: Status: ACTIVE | Noted: 2020-11-15

## 2020-11-16 PROBLEM — K57.92 ACUTE DIVERTICULITIS: Status: ACTIVE | Noted: 2020-11-16

## 2020-11-16 PROBLEM — E83.42 HYPOMAGNESEMIA: Status: ACTIVE | Noted: 2020-11-16

## 2020-11-16 LAB
ALBUMIN SERPL-MCNC: 2.3 G/DL (ref 3.5–5.2)
ALBUMIN/GLOB SERPL: 0.7 G/DL
ALP SERPL-CCNC: 68 U/L (ref 39–117)
ALT SERPL W P-5'-P-CCNC: 45 U/L (ref 1–33)
ANION GAP SERPL CALCULATED.3IONS-SCNC: 10 MMOL/L (ref 5–15)
ANISOCYTOSIS BLD QL: ABNORMAL
AST SERPL-CCNC: 32 U/L (ref 1–32)
B-HCG UR QL: NEGATIVE
BILIRUB SERPL-MCNC: 0.2 MG/DL (ref 0–1.2)
BUN SERPL-MCNC: 4 MG/DL (ref 6–20)
BUN/CREAT SERPL: 6.8 (ref 7–25)
C DIFF GDH STL QL: NEGATIVE
CALCIUM SPEC-SCNC: 7.8 MG/DL (ref 8.6–10.5)
CHLORIDE SERPL-SCNC: 100 MMOL/L (ref 98–107)
CO2 SERPL-SCNC: 24 MMOL/L (ref 22–29)
CREAT SERPL-MCNC: 0.59 MG/DL (ref 0.57–1)
CRP SERPL-MCNC: 19.26 MG/DL (ref 0–0.5)
D-LACTATE SERPL-SCNC: 1.4 MMOL/L (ref 0.5–2)
DEPRECATED RDW RBC AUTO: 44.7 FL (ref 37–54)
ERYTHROCYTE [DISTWIDTH] IN BLOOD BY AUTOMATED COUNT: 14 % (ref 12.3–15.4)
GFR SERPL CREATININE-BSD FRML MDRD: 111 ML/MIN/1.73
GLOBULIN UR ELPH-MCNC: 3.2 GM/DL
GLUCOSE BLDC GLUCOMTR-MCNC: 90 MG/DL (ref 70–130)
GLUCOSE SERPL-MCNC: 112 MG/DL (ref 65–99)
HAV IGM SERPL QL IA: NORMAL
HBV CORE IGM SERPL QL IA: NORMAL
HBV SURFACE AG SERPL QL IA: NORMAL
HCT VFR BLD AUTO: 26.9 % (ref 34–46.6)
HCV AB SER DONR QL: NORMAL
HGB BLD-MCNC: 8.7 G/DL (ref 12–15.9)
LYMPHOCYTES # BLD MANUAL: 0.8 10*3/MM3 (ref 0.7–3.1)
LYMPHOCYTES NFR BLD MANUAL: 6 % (ref 5–12)
LYMPHOCYTES NFR BLD MANUAL: 9 % (ref 19.6–45.3)
MAGNESIUM SERPL-MCNC: 2 MG/DL (ref 1.6–2.6)
MCH RBC QN AUTO: 28.5 PG (ref 26.6–33)
MCHC RBC AUTO-ENTMCNC: 32.3 G/DL (ref 31.5–35.7)
MCV RBC AUTO: 88.2 FL (ref 79–97)
METAMYELOCYTES NFR BLD MANUAL: 3 % (ref 0–0)
MONOCYTES # BLD AUTO: 0.54 10*3/MM3 (ref 0.1–0.9)
NEUTROPHILS # BLD AUTO: 7.31 10*3/MM3 (ref 1.7–7)
NEUTROPHILS NFR BLD MANUAL: 52 % (ref 42.7–76)
NEUTS BAND NFR BLD MANUAL: 30 % (ref 0–5)
PLATELET # BLD AUTO: 519 10*3/MM3 (ref 140–450)
PMV BLD AUTO: 10.1 FL (ref 6–12)
POIKILOCYTOSIS BLD QL SMEAR: ABNORMAL
POTASSIUM SERPL-SCNC: 3.1 MMOL/L (ref 3.5–5.2)
PROT SERPL-MCNC: 5.5 G/DL (ref 6–8.5)
RBC # BLD AUTO: 3.05 10*6/MM3 (ref 3.77–5.28)
SCAN SLIDE: NORMAL
SMALL PLATELETS BLD QL SMEAR: ABNORMAL
SODIUM SERPL-SCNC: 134 MMOL/L (ref 136–145)
TOXIC GRANULATION: ABNORMAL
WBC # BLD AUTO: 8.92 10*3/MM3 (ref 3.4–10.8)

## 2020-11-16 PROCEDURE — 85007 BL SMEAR W/DIFF WBC COUNT: CPT | Performed by: FAMILY MEDICINE

## 2020-11-16 PROCEDURE — 83605 ASSAY OF LACTIC ACID: CPT | Performed by: FAMILY MEDICINE

## 2020-11-16 PROCEDURE — 85025 COMPLETE CBC W/AUTO DIFF WBC: CPT | Performed by: FAMILY MEDICINE

## 2020-11-16 PROCEDURE — 25010000003 POTASSIUM CHLORIDE 10 MEQ/100ML SOLUTION: Performed by: FAMILY MEDICINE

## 2020-11-16 PROCEDURE — 80053 COMPREHEN METABOLIC PANEL: CPT | Performed by: FAMILY MEDICINE

## 2020-11-16 PROCEDURE — 83735 ASSAY OF MAGNESIUM: CPT | Performed by: FAMILY MEDICINE

## 2020-11-16 PROCEDURE — 99253 IP/OBS CNSLTJ NEW/EST LOW 45: CPT | Performed by: INTERNAL MEDICINE

## 2020-11-16 PROCEDURE — 99222 1ST HOSP IP/OBS MODERATE 55: CPT | Performed by: FAMILY MEDICINE

## 2020-11-16 PROCEDURE — 82962 GLUCOSE BLOOD TEST: CPT

## 2020-11-16 PROCEDURE — 63710000001 PROMETHAZINE PER 12.5 MG: Performed by: FAMILY MEDICINE

## 2020-11-16 PROCEDURE — 86140 C-REACTIVE PROTEIN: CPT | Performed by: FAMILY MEDICINE

## 2020-11-16 PROCEDURE — 80074 ACUTE HEPATITIS PANEL: CPT | Performed by: FAMILY MEDICINE

## 2020-11-16 PROCEDURE — 87324 CLOSTRIDIUM AG IA: CPT | Performed by: INTERNAL MEDICINE

## 2020-11-16 PROCEDURE — 87449 NOS EACH ORGANISM AG IA: CPT | Performed by: INTERNAL MEDICINE

## 2020-11-16 PROCEDURE — 81025 URINE PREGNANCY TEST: CPT | Performed by: INTERNAL MEDICINE

## 2020-11-16 PROCEDURE — 25010000002 ENOXAPARIN PER 10 MG: Performed by: FAMILY MEDICINE

## 2020-11-16 RX ORDER — POTASSIUM CHLORIDE 7.45 MG/ML
10 INJECTION INTRAVENOUS
Status: COMPLETED | OUTPATIENT
Start: 2020-11-16 | End: 2020-11-16

## 2020-11-16 RX ORDER — POTASSIUM CHLORIDE 750 MG/1
40 CAPSULE, EXTENDED RELEASE ORAL ONCE
Status: COMPLETED | OUTPATIENT
Start: 2020-11-16 | End: 2020-11-16

## 2020-11-16 RX ORDER — PROMETHAZINE HYDROCHLORIDE 25 MG/1
12.5 SUPPOSITORY RECTAL EVERY 6 HOURS PRN
Status: DISCONTINUED | OUTPATIENT
Start: 2020-11-16 | End: 2020-11-19 | Stop reason: HOSPADM

## 2020-11-16 RX ORDER — MAGNESIUM CARB/ALUMINUM HYDROX 105-160MG
296 TABLET,CHEWABLE ORAL ONCE
Status: COMPLETED | OUTPATIENT
Start: 2020-11-16 | End: 2020-11-16

## 2020-11-16 RX ORDER — SODIUM CHLORIDE 0.9 % (FLUSH) 0.9 %
10 SYRINGE (ML) INJECTION AS NEEDED
Status: DISCONTINUED | OUTPATIENT
Start: 2020-11-16 | End: 2020-11-19 | Stop reason: HOSPADM

## 2020-11-16 RX ORDER — PROMETHAZINE HYDROCHLORIDE 6.25 MG/5ML
12.5 SYRUP ORAL EVERY 6 HOURS PRN
Status: DISCONTINUED | OUTPATIENT
Start: 2020-11-16 | End: 2020-11-19 | Stop reason: HOSPADM

## 2020-11-16 RX ORDER — PROMETHAZINE HYDROCHLORIDE 12.5 MG/1
12.5 TABLET ORAL EVERY 6 HOURS PRN
Status: DISCONTINUED | OUTPATIENT
Start: 2020-11-16 | End: 2020-11-19 | Stop reason: HOSPADM

## 2020-11-16 RX ORDER — CHOLECALCIFEROL (VITAMIN D3) 125 MCG
5 CAPSULE ORAL NIGHTLY PRN
Status: DISCONTINUED | OUTPATIENT
Start: 2020-11-16 | End: 2020-11-19 | Stop reason: HOSPADM

## 2020-11-16 RX ORDER — SODIUM CHLORIDE 0.9 % (FLUSH) 0.9 %
10 SYRINGE (ML) INJECTION EVERY 12 HOURS SCHEDULED
Status: DISCONTINUED | OUTPATIENT
Start: 2020-11-16 | End: 2020-11-19 | Stop reason: HOSPADM

## 2020-11-16 RX ORDER — HYDROCODONE BITARTRATE AND ACETAMINOPHEN 5; 325 MG/1; MG/1
1 TABLET ORAL EVERY 6 HOURS PRN
Status: ACTIVE | OUTPATIENT
Start: 2020-11-16 | End: 2020-11-17

## 2020-11-16 RX ORDER — LEVOFLOXACIN 5 MG/ML
500 INJECTION, SOLUTION INTRAVENOUS EVERY 24 HOURS
Status: DISCONTINUED | OUTPATIENT
Start: 2020-11-17 | End: 2020-11-19 | Stop reason: HOSPADM

## 2020-11-16 RX ORDER — ONDANSETRON 4 MG/1
4 TABLET, FILM COATED ORAL EVERY 6 HOURS PRN
Status: DISCONTINUED | OUTPATIENT
Start: 2020-11-16 | End: 2020-11-16

## 2020-11-16 RX ORDER — SODIUM CHLORIDE 9 MG/ML
70 INJECTION, SOLUTION INTRAVENOUS CONTINUOUS PRN
Status: DISCONTINUED | OUTPATIENT
Start: 2020-11-16 | End: 2020-11-17

## 2020-11-16 RX ORDER — MORPHINE SULFATE 2 MG/ML
1 INJECTION, SOLUTION INTRAMUSCULAR; INTRAVENOUS EVERY 4 HOURS PRN
Status: DISCONTINUED | OUTPATIENT
Start: 2020-11-16 | End: 2020-11-19 | Stop reason: HOSPADM

## 2020-11-16 RX ORDER — ONDANSETRON 2 MG/ML
4 INJECTION INTRAMUSCULAR; INTRAVENOUS EVERY 6 HOURS PRN
Status: DISCONTINUED | OUTPATIENT
Start: 2020-11-16 | End: 2020-11-16

## 2020-11-16 RX ADMIN — ENOXAPARIN SODIUM 40 MG: 40 INJECTION SUBCUTANEOUS at 04:29

## 2020-11-16 RX ADMIN — METRONIDAZOLE 500 MG: 500 INJECTION, SOLUTION INTRAVENOUS at 16:55

## 2020-11-16 RX ADMIN — POTASSIUM CHLORIDE 10 MEQ: 7.46 INJECTION, SOLUTION INTRAVENOUS at 13:21

## 2020-11-16 RX ADMIN — MAGNESIUM CITRATE 296 ML: 1.75 LIQUID ORAL at 20:33

## 2020-11-16 RX ADMIN — POTASSIUM CHLORIDE 10 MEQ: 7.46 INJECTION, SOLUTION INTRAVENOUS at 12:20

## 2020-11-16 RX ADMIN — PROMETHAZINE HYDROCHLORIDE 12.5 MG: 12.5 TABLET ORAL at 17:23

## 2020-11-16 RX ADMIN — POTASSIUM CHLORIDE 10 MEQ: 10 CAPSULE, COATED, EXTENDED RELEASE ORAL at 04:30

## 2020-11-16 RX ADMIN — POTASSIUM CHLORIDE 10 MEQ: 7.46 INJECTION, SOLUTION INTRAVENOUS at 11:05

## 2020-11-16 RX ADMIN — SODIUM CHLORIDE 125 ML/HR: 9 INJECTION, SOLUTION INTRAVENOUS at 02:11

## 2020-11-16 RX ADMIN — SODIUM CHLORIDE 125 ML/HR: 9 INJECTION, SOLUTION INTRAVENOUS at 20:35

## 2020-11-16 RX ADMIN — SODIUM CHLORIDE 125 ML/HR: 9 INJECTION, SOLUTION INTRAVENOUS at 07:38

## 2020-11-16 RX ADMIN — PROMETHAZINE HYDROCHLORIDE 12.5 MG: 12.5 TABLET ORAL at 07:46

## 2020-11-16 RX ADMIN — POTASSIUM CHLORIDE 10 MEQ: 7.46 INJECTION, SOLUTION INTRAVENOUS at 10:06

## 2020-11-16 RX ADMIN — METRONIDAZOLE 500 MG: 500 INJECTION, SOLUTION INTRAVENOUS at 07:38

## 2020-11-16 NOTE — ANESTHESIA PREPROCEDURE EVALUATION
Anesthesia Evaluation     Patient summary reviewed and Nursing notes reviewed   NPO Solid Status: > 8 hours  NPO Liquid Status: > 8 hours           Airway   Mallampati: II  TM distance: >3 FB  Neck ROM: full  No difficulty expected  Dental - normal exam     Pulmonary    (+) a smoker Current Abstained day of surgery, COPD moderate,   Cardiovascular - normal exam    (+) dysrhythmias PVC,       Neuro/Psych  (+) psychiatric history,     GI/Hepatic/Renal/Endo      Musculoskeletal     Abdominal  - normal exam   Substance History      OB/GYN          Other                      Anesthesia Plan    ASA 2     MAC     intravenous induction     Anesthetic plan, all risks, benefits, and alternatives have been provided, discussed and informed consent has been obtained with: patient.    Plan discussed with CRNA.

## 2020-11-17 LAB
ALBUMIN SERPL-MCNC: 2.3 G/DL (ref 3.5–5.2)
ALBUMIN/GLOB SERPL: 0.8 G/DL
ALP SERPL-CCNC: 62 U/L (ref 39–117)
ALT SERPL W P-5'-P-CCNC: 45 U/L (ref 1–33)
ANION GAP SERPL CALCULATED.3IONS-SCNC: 9.5 MMOL/L (ref 5–15)
AST SERPL-CCNC: 44 U/L (ref 1–32)
BILIRUB SERPL-MCNC: 0.2 MG/DL (ref 0–1.2)
BUN SERPL-MCNC: 3 MG/DL (ref 6–20)
BUN/CREAT SERPL: 6.7 (ref 7–25)
CALCIUM SPEC-SCNC: 7.7 MG/DL (ref 8.6–10.5)
CHLORIDE SERPL-SCNC: 99 MMOL/L (ref 98–107)
CO2 SERPL-SCNC: 24.5 MMOL/L (ref 22–29)
CREAT SERPL-MCNC: 0.45 MG/DL (ref 0.57–1)
DEPRECATED RDW RBC AUTO: 46.4 FL (ref 37–54)
ERYTHROCYTE [DISTWIDTH] IN BLOOD BY AUTOMATED COUNT: 14.3 % (ref 12.3–15.4)
GFR SERPL CREATININE-BSD FRML MDRD: >150 ML/MIN/1.73
GLOBULIN UR ELPH-MCNC: 3 GM/DL
GLUCOSE BLDC GLUCOMTR-MCNC: 89 MG/DL (ref 70–130)
GLUCOSE SERPL-MCNC: 89 MG/DL (ref 65–99)
HBV SURFACE AB SER RIA-ACNC: NORMAL
HCT VFR BLD AUTO: 24 % (ref 34–46.6)
HGB BLD-MCNC: 7.8 G/DL (ref 12–15.9)
MAGNESIUM SERPL-MCNC: 2.6 MG/DL (ref 1.6–2.6)
MCH RBC QN AUTO: 28.9 PG (ref 26.6–33)
MCHC RBC AUTO-ENTMCNC: 32.5 G/DL (ref 31.5–35.7)
MCV RBC AUTO: 88.9 FL (ref 79–97)
PLATELET # BLD AUTO: 462 10*3/MM3 (ref 140–450)
PMV BLD AUTO: 9.8 FL (ref 6–12)
POTASSIUM SERPL-SCNC: 3 MMOL/L (ref 3.5–5.2)
PROT SERPL-MCNC: 5.3 G/DL (ref 6–8.5)
RBC # BLD AUTO: 2.7 10*6/MM3 (ref 3.77–5.28)
SODIUM SERPL-SCNC: 133 MMOL/L (ref 136–145)
WBC # BLD AUTO: 11.15 10*3/MM3 (ref 3.4–10.8)

## 2020-11-17 PROCEDURE — 25810000003 SODIUM CHLORIDE 0.9 % WITH KCL 20 MEQ 20-0.9 MEQ/L-% SOLUTION: Performed by: INTERNAL MEDICINE

## 2020-11-17 PROCEDURE — 83735 ASSAY OF MAGNESIUM: CPT | Performed by: INTERNAL MEDICINE

## 2020-11-17 PROCEDURE — 85027 COMPLETE CBC AUTOMATED: CPT | Performed by: INTERNAL MEDICINE

## 2020-11-17 PROCEDURE — 0DBB8ZX EXCISION OF ILEUM, VIA NATURAL OR ARTIFICIAL OPENING ENDOSCOPIC, DIAGNOSTIC: ICD-10-PCS | Performed by: INTERNAL MEDICINE

## 2020-11-17 PROCEDURE — 86706 HEP B SURFACE ANTIBODY: CPT | Performed by: INTERNAL MEDICINE

## 2020-11-17 PROCEDURE — 0DBL8ZX EXCISION OF TRANSVERSE COLON, VIA NATURAL OR ARTIFICIAL OPENING ENDOSCOPIC, DIAGNOSTIC: ICD-10-PCS | Performed by: INTERNAL MEDICINE

## 2020-11-17 PROCEDURE — 0DBM8ZX EXCISION OF DESCENDING COLON, VIA NATURAL OR ARTIFICIAL OPENING ENDOSCOPIC, DIAGNOSTIC: ICD-10-PCS | Performed by: INTERNAL MEDICINE

## 2020-11-17 PROCEDURE — 0DBH8ZX EXCISION OF CECUM, VIA NATURAL OR ARTIFICIAL OPENING ENDOSCOPIC, DIAGNOSTIC: ICD-10-PCS | Performed by: INTERNAL MEDICINE

## 2020-11-17 PROCEDURE — 0DBN8ZX EXCISION OF SIGMOID COLON, VIA NATURAL OR ARTIFICIAL OPENING ENDOSCOPIC, DIAGNOSTIC: ICD-10-PCS | Performed by: INTERNAL MEDICINE

## 2020-11-17 PROCEDURE — 25010000002 PROPOFOL 10 MG/ML EMULSION: Performed by: NURSE ANESTHETIST, CERTIFIED REGISTERED

## 2020-11-17 PROCEDURE — 0DBP8ZX EXCISION OF RECTUM, VIA NATURAL OR ARTIFICIAL OPENING ENDOSCOPIC, DIAGNOSTIC: ICD-10-PCS | Performed by: INTERNAL MEDICINE

## 2020-11-17 PROCEDURE — 88305 TISSUE EXAM BY PATHOLOGIST: CPT | Performed by: INTERNAL MEDICINE

## 2020-11-17 PROCEDURE — 25010000002 PROPOFOL 200 MG/20ML EMULSION: Performed by: NURSE ANESTHETIST, CERTIFIED REGISTERED

## 2020-11-17 PROCEDURE — 86480 TB TEST CELL IMMUN MEASURE: CPT | Performed by: INTERNAL MEDICINE

## 2020-11-17 PROCEDURE — 25010000002 METHYLPREDNISOLONE PER 40 MG: Performed by: INTERNAL MEDICINE

## 2020-11-17 PROCEDURE — 82962 GLUCOSE BLOOD TEST: CPT

## 2020-11-17 PROCEDURE — 86708 HEPATITIS A ANTIBODY: CPT | Performed by: INTERNAL MEDICINE

## 2020-11-17 PROCEDURE — 45380 COLONOSCOPY AND BIOPSY: CPT | Performed by: INTERNAL MEDICINE

## 2020-11-17 PROCEDURE — 25010000002 LEVOFLOXACIN PER 250 MG: Performed by: FAMILY MEDICINE

## 2020-11-17 PROCEDURE — 0DBK8ZX EXCISION OF ASCENDING COLON, VIA NATURAL OR ARTIFICIAL OPENING ENDOSCOPIC, DIAGNOSTIC: ICD-10-PCS | Performed by: INTERNAL MEDICINE

## 2020-11-17 PROCEDURE — 86704 HEP B CORE ANTIBODY TOTAL: CPT | Performed by: INTERNAL MEDICINE

## 2020-11-17 PROCEDURE — 80053 COMPREHEN METABOLIC PANEL: CPT | Performed by: INTERNAL MEDICINE

## 2020-11-17 PROCEDURE — 99232 SBSQ HOSP IP/OBS MODERATE 35: CPT | Performed by: INTERNAL MEDICINE

## 2020-11-17 RX ORDER — METHYLPREDNISOLONE SODIUM SUCCINATE 40 MG/ML
20 INJECTION, POWDER, LYOPHILIZED, FOR SOLUTION INTRAMUSCULAR; INTRAVENOUS EVERY 8 HOURS
Status: COMPLETED | OUTPATIENT
Start: 2020-11-17 | End: 2020-11-19

## 2020-11-17 RX ORDER — KETAMINE HYDROCHLORIDE 50 MG/ML
INJECTION, SOLUTION, CONCENTRATE INTRAMUSCULAR; INTRAVENOUS AS NEEDED
Status: DISCONTINUED | OUTPATIENT
Start: 2020-11-17 | End: 2020-11-17 | Stop reason: SURG

## 2020-11-17 RX ORDER — SODIUM CHLORIDE 9 MG/ML
70 INJECTION, SOLUTION INTRAVENOUS CONTINUOUS PRN
Status: DISCONTINUED | OUTPATIENT
Start: 2020-11-17 | End: 2020-11-17

## 2020-11-17 RX ORDER — PROPOFOL 10 MG/ML
INJECTION, EMULSION INTRAVENOUS AS NEEDED
Status: DISCONTINUED | OUTPATIENT
Start: 2020-11-17 | End: 2020-11-17 | Stop reason: SURG

## 2020-11-17 RX ORDER — SODIUM CHLORIDE AND POTASSIUM CHLORIDE 150; 900 MG/100ML; MG/100ML
100 INJECTION, SOLUTION INTRAVENOUS CONTINUOUS
Status: DISCONTINUED | OUTPATIENT
Start: 2020-11-17 | End: 2020-11-18

## 2020-11-17 RX ORDER — LIDOCAINE HYDROCHLORIDE 20 MG/ML
INJECTION, SOLUTION INTRAVENOUS AS NEEDED
Status: DISCONTINUED | OUTPATIENT
Start: 2020-11-17 | End: 2020-11-17 | Stop reason: SURG

## 2020-11-17 RX ORDER — SODIUM CHLORIDE 0.9 % (FLUSH) 0.9 %
10 SYRINGE (ML) INJECTION AS NEEDED
Status: DISCONTINUED | OUTPATIENT
Start: 2020-11-17 | End: 2020-11-17 | Stop reason: HOSPADM

## 2020-11-17 RX ORDER — SODIUM CHLORIDE 9 MG/ML
50 INJECTION, SOLUTION INTRAVENOUS CONTINUOUS
Status: DISCONTINUED | OUTPATIENT
Start: 2020-11-17 | End: 2020-11-18

## 2020-11-17 RX ORDER — POTASSIUM CHLORIDE 1.5 G/1.77G
40 POWDER, FOR SOLUTION ORAL 2 TIMES DAILY
Status: DISCONTINUED | OUTPATIENT
Start: 2020-11-17 | End: 2020-11-19 | Stop reason: HOSPADM

## 2020-11-17 RX ADMIN — POTASSIUM CHLORIDE 40 MEQ: 1.5 POWDER, FOR SOLUTION ORAL at 20:17

## 2020-11-17 RX ADMIN — METRONIDAZOLE 500 MG: 500 INJECTION, SOLUTION INTRAVENOUS at 23:26

## 2020-11-17 RX ADMIN — LEVOFLOXACIN 500 MG: 5 INJECTION, SOLUTION INTRAVENOUS at 00:03

## 2020-11-17 RX ADMIN — METRONIDAZOLE 500 MG: 500 INJECTION, SOLUTION INTRAVENOUS at 16:47

## 2020-11-17 RX ADMIN — SODIUM CHLORIDE 50 ML/HR: 9 INJECTION, SOLUTION INTRAVENOUS at 10:11

## 2020-11-17 RX ADMIN — METHYLPREDNISOLONE SODIUM SUCCINATE 20 MG: 40 INJECTION, POWDER, FOR SOLUTION INTRAMUSCULAR; INTRAVENOUS at 13:34

## 2020-11-17 RX ADMIN — SODIUM CHLORIDE, PRESERVATIVE FREE 10 ML: 5 INJECTION INTRAVENOUS at 20:35

## 2020-11-17 RX ADMIN — LIDOCAINE HYDROCHLORIDE 100 MG: 20 INJECTION, SOLUTION INTRAVENOUS at 10:43

## 2020-11-17 RX ADMIN — METHYLPREDNISOLONE SODIUM SUCCINATE 20 MG: 40 INJECTION, POWDER, FOR SOLUTION INTRAMUSCULAR; INTRAVENOUS at 20:16

## 2020-11-17 RX ADMIN — SODIUM CHLORIDE 125 ML/HR: 9 INJECTION, SOLUTION INTRAVENOUS at 06:01

## 2020-11-17 RX ADMIN — METRONIDAZOLE 500 MG: 500 INJECTION, SOLUTION INTRAVENOUS at 07:56

## 2020-11-17 RX ADMIN — PROPOFOL 120 MCG/KG/MIN: 10 INJECTION, EMULSION INTRAVENOUS at 10:40

## 2020-11-17 RX ADMIN — POTASSIUM CHLORIDE AND SODIUM CHLORIDE 100 ML/HR: 900; 150 INJECTION, SOLUTION INTRAVENOUS at 09:48

## 2020-11-17 RX ADMIN — METRONIDAZOLE 500 MG: 500 INJECTION, SOLUTION INTRAVENOUS at 00:03

## 2020-11-17 RX ADMIN — PROPOFOL 100 MG: 10 INJECTION, EMULSION INTRAVENOUS at 10:43

## 2020-11-17 RX ADMIN — KETAMINE HYDROCHLORIDE 25 MG: 50 INJECTION, SOLUTION INTRAMUSCULAR; INTRAVENOUS at 10:43

## 2020-11-17 NOTE — ANESTHESIA POSTPROCEDURE EVALUATION
Patient: Stacy Guardado    Procedure Summary     Date: 11/17/20 Room / Location: [facility] ENDOSCOPY 2 / [facility] ENDOSCOPY    Anesthesia Start: 1034 Anesthesia Stop: 1105    Procedure: COLONOSCOPY WITH BIOPSIES (N/A Anus) Diagnosis:       Colitis      Bloody diarrhea      (Colitis [K52.9])      (Bloody diarrhea [R19.7])    Surgeon: Susan Brizuela MD Provider: Valeriano Jaffe CRNA    Anesthesia Type: MAC ASA Status: 2          Anesthesia Type: MAC    Vitals  Vitals Value Taken Time   /70 11/17/20 1204   Temp 97.8 °F (36.6 °C) 11/17/20 1145   Pulse 97 11/17/20 1419   Resp 14 11/17/20 1145   SpO2 97 % 11/17/20 1336   Vitals shown include unvalidated device data.          Post Anesthesia Care and Evaluation    Patient location during evaluation: PACU  Patient participation: complete - patient participated  Level of consciousness: awake and alert  Pain management: satisfactory to patient  Airway patency: patent  Anesthetic complications: No anesthetic complications  PONV Status: none  Cardiovascular status: acceptable and hemodynamically stable  Respiratory status: acceptable  Hydration status: acceptable

## 2020-11-18 LAB
ALBUMIN SERPL-MCNC: 2.3 G/DL (ref 3.5–5.2)
ALBUMIN/GLOB SERPL: 0.7 G/DL
ALP SERPL-CCNC: 61 U/L (ref 39–117)
ALT SERPL W P-5'-P-CCNC: 59 U/L (ref 1–33)
ANION GAP SERPL CALCULATED.3IONS-SCNC: 10.6 MMOL/L (ref 5–15)
AST SERPL-CCNC: 56 U/L (ref 1–32)
BILIRUB SERPL-MCNC: 0.2 MG/DL (ref 0–1.2)
BUN SERPL-MCNC: 4 MG/DL (ref 6–20)
BUN/CREAT SERPL: 10 (ref 7–25)
CALCIUM SPEC-SCNC: 8.3 MG/DL (ref 8.6–10.5)
CHLORIDE SERPL-SCNC: 102 MMOL/L (ref 98–107)
CO2 SERPL-SCNC: 22.4 MMOL/L (ref 22–29)
CREAT SERPL-MCNC: 0.4 MG/DL (ref 0.57–1)
DEPRECATED RDW RBC AUTO: 45.7 FL (ref 37–54)
ERYTHROCYTE [DISTWIDTH] IN BLOOD BY AUTOMATED COUNT: 14.1 % (ref 12.3–15.4)
GFR SERPL CREATININE-BSD FRML MDRD: >150 ML/MIN/1.73
GLOBULIN UR ELPH-MCNC: 3.2 GM/DL
GLUCOSE SERPL-MCNC: 112 MG/DL (ref 65–99)
HAV AB SER QL IA: POSITIVE
HBV CORE AB SERPL QL IA: NEGATIVE
HCT VFR BLD AUTO: 24.3 % (ref 34–46.6)
HGB BLD-MCNC: 8 G/DL (ref 12–15.9)
MAGNESIUM SERPL-MCNC: 2.3 MG/DL (ref 1.6–2.6)
MCH RBC QN AUTO: 28.8 PG (ref 26.6–33)
MCHC RBC AUTO-ENTMCNC: 32.9 G/DL (ref 31.5–35.7)
MCV RBC AUTO: 87.4 FL (ref 79–97)
PLATELET # BLD AUTO: 502 10*3/MM3 (ref 140–450)
PMV BLD AUTO: 9.4 FL (ref 6–12)
POTASSIUM SERPL-SCNC: 3.9 MMOL/L (ref 3.5–5.2)
PROT SERPL-MCNC: 5.5 G/DL (ref 6–8.5)
RBC # BLD AUTO: 2.78 10*6/MM3 (ref 3.77–5.28)
SODIUM SERPL-SCNC: 135 MMOL/L (ref 136–145)
WBC # BLD AUTO: 7.63 10*3/MM3 (ref 3.4–10.8)

## 2020-11-18 PROCEDURE — 99232 SBSQ HOSP IP/OBS MODERATE 35: CPT | Performed by: INTERNAL MEDICINE

## 2020-11-18 PROCEDURE — 25010000002 METHYLPREDNISOLONE PER 40 MG: Performed by: INTERNAL MEDICINE

## 2020-11-18 PROCEDURE — 25010000002 LEVOFLOXACIN PER 250 MG: Performed by: INTERNAL MEDICINE

## 2020-11-18 PROCEDURE — 83735 ASSAY OF MAGNESIUM: CPT | Performed by: INTERNAL MEDICINE

## 2020-11-18 PROCEDURE — 85027 COMPLETE CBC AUTOMATED: CPT | Performed by: INTERNAL MEDICINE

## 2020-11-18 PROCEDURE — 80053 COMPREHEN METABOLIC PANEL: CPT | Performed by: INTERNAL MEDICINE

## 2020-11-18 RX ADMIN — METRONIDAZOLE 500 MG: 500 INJECTION, SOLUTION INTRAVENOUS at 17:00

## 2020-11-18 RX ADMIN — SODIUM CHLORIDE, PRESERVATIVE FREE 10 ML: 5 INJECTION INTRAVENOUS at 08:42

## 2020-11-18 RX ADMIN — METHYLPREDNISOLONE SODIUM SUCCINATE 20 MG: 40 INJECTION, POWDER, FOR SOLUTION INTRAMUSCULAR; INTRAVENOUS at 20:37

## 2020-11-18 RX ADMIN — LEVOFLOXACIN 500 MG: 5 INJECTION, SOLUTION INTRAVENOUS at 00:31

## 2020-11-18 RX ADMIN — POTASSIUM CHLORIDE 40 MEQ: 1.5 POWDER, FOR SOLUTION ORAL at 08:41

## 2020-11-18 RX ADMIN — METRONIDAZOLE 500 MG: 500 INJECTION, SOLUTION INTRAVENOUS at 08:41

## 2020-11-18 RX ADMIN — POTASSIUM CHLORIDE 40 MEQ: 1.5 POWDER, FOR SOLUTION ORAL at 20:37

## 2020-11-18 RX ADMIN — SODIUM CHLORIDE, PRESERVATIVE FREE 10 ML: 5 INJECTION INTRAVENOUS at 20:37

## 2020-11-18 RX ADMIN — METHYLPREDNISOLONE SODIUM SUCCINATE 20 MG: 40 INJECTION, POWDER, FOR SOLUTION INTRAMUSCULAR; INTRAVENOUS at 05:14

## 2020-11-18 RX ADMIN — METHYLPREDNISOLONE SODIUM SUCCINATE 20 MG: 40 INJECTION, POWDER, FOR SOLUTION INTRAMUSCULAR; INTRAVENOUS at 14:00

## 2020-11-18 RX ADMIN — LEVOFLOXACIN 500 MG: 5 INJECTION, SOLUTION INTRAVENOUS at 23:45

## 2020-11-19 VITALS
OXYGEN SATURATION: 96 % | WEIGHT: 155.65 LBS | TEMPERATURE: 97.5 F | DIASTOLIC BLOOD PRESSURE: 78 MMHG | HEIGHT: 64 IN | RESPIRATION RATE: 18 BRPM | BODY MASS INDEX: 26.57 KG/M2 | HEART RATE: 71 BPM | SYSTOLIC BLOOD PRESSURE: 126 MMHG

## 2020-11-19 LAB
ALBUMIN SERPL-MCNC: 2.5 G/DL (ref 3.5–5.2)
ALBUMIN/GLOB SERPL: 0.8 G/DL
ALP SERPL-CCNC: 60 U/L (ref 39–117)
ALT SERPL W P-5'-P-CCNC: 63 U/L (ref 1–33)
ANION GAP SERPL CALCULATED.3IONS-SCNC: 6.2 MMOL/L (ref 5–15)
AST SERPL-CCNC: 36 U/L (ref 1–32)
BASOPHILS # BLD AUTO: 0.05 10*3/MM3 (ref 0–0.2)
BASOPHILS NFR BLD AUTO: 0.5 % (ref 0–1.5)
BILIRUB SERPL-MCNC: <0.2 MG/DL (ref 0–1.2)
BUN SERPL-MCNC: 8 MG/DL (ref 6–20)
BUN/CREAT SERPL: 17.8 (ref 7–25)
CALCIUM SPEC-SCNC: 8.3 MG/DL (ref 8.6–10.5)
CHLORIDE SERPL-SCNC: 106 MMOL/L (ref 98–107)
CO2 SERPL-SCNC: 24.8 MMOL/L (ref 22–29)
CREAT SERPL-MCNC: 0.45 MG/DL (ref 0.57–1)
DEPRECATED RDW RBC AUTO: 48 FL (ref 37–54)
EOSINOPHIL # BLD AUTO: 0.01 10*3/MM3 (ref 0–0.4)
EOSINOPHIL NFR BLD AUTO: 0.1 % (ref 0.3–6.2)
ERYTHROCYTE [DISTWIDTH] IN BLOOD BY AUTOMATED COUNT: 14.7 % (ref 12.3–15.4)
GFR SERPL CREATININE-BSD FRML MDRD: >150 ML/MIN/1.73
GLOBULIN UR ELPH-MCNC: 3 GM/DL
GLUCOSE SERPL-MCNC: 130 MG/DL (ref 65–99)
HCT VFR BLD AUTO: 25.8 % (ref 34–46.6)
HGB BLD-MCNC: 8.1 G/DL (ref 12–15.9)
IMM GRANULOCYTES # BLD AUTO: 0.13 10*3/MM3 (ref 0–0.05)
IMM GRANULOCYTES NFR BLD AUTO: 1.3 % (ref 0–0.5)
LYMPHOCYTES # BLD AUTO: 1.86 10*3/MM3 (ref 0.7–3.1)
LYMPHOCYTES NFR BLD AUTO: 19.1 % (ref 19.6–45.3)
MCH RBC QN AUTO: 28.2 PG (ref 26.6–33)
MCHC RBC AUTO-ENTMCNC: 31.4 G/DL (ref 31.5–35.7)
MCV RBC AUTO: 89.9 FL (ref 79–97)
MONOCYTES # BLD AUTO: 1.49 10*3/MM3 (ref 0.1–0.9)
MONOCYTES NFR BLD AUTO: 15.3 % (ref 5–12)
NEUTROPHILS NFR BLD AUTO: 6.21 10*3/MM3 (ref 1.7–7)
NEUTROPHILS NFR BLD AUTO: 63.7 % (ref 42.7–76)
NRBC BLD AUTO-RTO: 0 /100 WBC (ref 0–0.2)
PLATELET # BLD AUTO: 603 10*3/MM3 (ref 140–450)
PMV BLD AUTO: 9.9 FL (ref 6–12)
POTASSIUM SERPL-SCNC: 4.4 MMOL/L (ref 3.5–5.2)
PROT SERPL-MCNC: 5.5 G/DL (ref 6–8.5)
RBC # BLD AUTO: 2.87 10*6/MM3 (ref 3.77–5.28)
SODIUM SERPL-SCNC: 137 MMOL/L (ref 136–145)
WBC # BLD AUTO: 9.75 10*3/MM3 (ref 3.4–10.8)

## 2020-11-19 PROCEDURE — 25010000002 METHYLPREDNISOLONE PER 40 MG: Performed by: INTERNAL MEDICINE

## 2020-11-19 PROCEDURE — 80053 COMPREHEN METABOLIC PANEL: CPT | Performed by: INTERNAL MEDICINE

## 2020-11-19 PROCEDURE — 99239 HOSP IP/OBS DSCHRG MGMT >30: CPT | Performed by: INTERNAL MEDICINE

## 2020-11-19 PROCEDURE — 85025 COMPLETE CBC W/AUTO DIFF WBC: CPT | Performed by: INTERNAL MEDICINE

## 2020-11-19 RX ORDER — PREDNISONE 10 MG/1
10 TABLET ORAL DAILY
Qty: 7 TABLET | Refills: 0 | Status: SHIPPED | OUTPATIENT
Start: 2020-11-19 | End: 2020-11-26

## 2020-11-19 RX ORDER — METRONIDAZOLE 500 MG/1
500 TABLET ORAL 3 TIMES DAILY
Qty: 15 TABLET | Refills: 0 | Status: SHIPPED | OUTPATIENT
Start: 2020-11-19 | End: 2020-11-24

## 2020-11-19 RX ORDER — PREDNISONE 10 MG/1
30 TABLET ORAL DAILY
Qty: 74 TABLET | Refills: 0 | Status: SHIPPED | OUTPATIENT
Start: 2020-11-19 | End: 2020-12-24

## 2020-11-19 RX ORDER — LEVOFLOXACIN 750 MG/1
750 TABLET ORAL DAILY
Qty: 5 TABLET | Refills: 0 | Status: SHIPPED | OUTPATIENT
Start: 2020-11-19 | End: 2020-11-24

## 2020-11-19 RX ORDER — PREDNISONE 1 MG/1
5 TABLET ORAL DAILY
Qty: 7 TABLET | Refills: 0 | Status: SHIPPED | OUTPATIENT
Start: 2020-11-19 | End: 2020-11-26

## 2020-11-19 RX ORDER — PREDNISONE 20 MG/1
40 TABLET ORAL DAILY
Qty: 14 TABLET | Refills: 0 | Status: SHIPPED | OUTPATIENT
Start: 2020-11-19 | End: 2020-11-26

## 2020-11-19 RX ORDER — DOXYCYCLINE HYCLATE 50 MG/1
324 CAPSULE, GELATIN COATED ORAL
Qty: 30 TABLET | Refills: 0 | Status: SHIPPED | OUTPATIENT
Start: 2020-11-19 | End: 2020-12-19

## 2020-11-19 RX ORDER — PREDNISONE 20 MG/1
20 TABLET ORAL DAILY
Qty: 7 TABLET | Refills: 0 | Status: SHIPPED | OUTPATIENT
Start: 2020-11-19 | End: 2020-11-26

## 2020-11-19 RX ADMIN — METHYLPREDNISOLONE SODIUM SUCCINATE 20 MG: 40 INJECTION, POWDER, FOR SOLUTION INTRAMUSCULAR; INTRAVENOUS at 06:59

## 2020-11-19 RX ADMIN — POTASSIUM CHLORIDE 40 MEQ: 1.5 POWDER, FOR SOLUTION ORAL at 09:59

## 2020-11-19 RX ADMIN — SODIUM CHLORIDE, PRESERVATIVE FREE 10 ML: 5 INJECTION INTRAVENOUS at 10:42

## 2020-11-19 RX ADMIN — METRONIDAZOLE 500 MG: 500 INJECTION, SOLUTION INTRAVENOUS at 09:59

## 2020-11-19 RX ADMIN — METRONIDAZOLE 500 MG: 500 INJECTION, SOLUTION INTRAVENOUS at 00:30

## 2020-11-20 ENCOUNTER — READMISSION MANAGEMENT (OUTPATIENT)
Dept: CALL CENTER | Facility: HOSPITAL | Age: 45
End: 2020-11-20

## 2020-11-20 LAB
BACTERIA SPEC AEROBE CULT: NORMAL
BACTERIA SPEC AEROBE CULT: NORMAL
GAMMA INTERFERON BACKGROUND BLD IA-ACNC: 0.04 IU/ML
M TB IFN-G BLD-IMP: NEGATIVE
M TB IFN-G CD4+ BCKGRND COR BLD-ACNC: 0.08 IU/ML
M TB IFN-G CD4+CD8+ BCKGRND COR BLD-ACNC: 0.04 IU/ML
MITOGEN IGNF BLD-ACNC: 6.7 IU/ML
SERVICE CMNT-IMP: NORMAL

## 2020-11-20 NOTE — OUTREACH NOTE
Prep Survey      Responses   [facility] patient discharged from?  [facility]   Is LACE score < 7 ?  No   Eligibility  Readm Mgmt   Discharge diagnosis  Sepsis without acute organ dysfunction    Does the patient have one of the following disease processes/diagnoses(primary or secondary)?  Other   Does the patient have Home health ordered?  No   Is there a DME ordered?  No   Prep survey completed?  Yes          Mirella Guzmán RN

## 2020-11-23 ENCOUNTER — READMISSION MANAGEMENT (OUTPATIENT)
Dept: CALL CENTER | Facility: HOSPITAL | Age: 45
End: 2020-11-23

## 2020-11-23 NOTE — OUTREACH NOTE
Medical Week 1 Survey      Responses   [facility] patient discharged from?  [facility]   Does the patient have one of the following disease processes/diagnoses(primary or secondary)?  Other   Week 1 attempt successful?  No          Makenna Chapa RN

## 2020-11-25 ENCOUNTER — READMISSION MANAGEMENT (OUTPATIENT)
Dept: CALL CENTER | Facility: HOSPITAL | Age: 45
End: 2020-11-25

## 2020-11-25 NOTE — OUTREACH NOTE
Medical Week 1 Survey      Responses   [facility] patient discharged from?  [facility]   Does the patient have one of the following disease processes/diagnoses(primary or secondary)?  Other   Week 1 attempt successful?  Yes   Call start time  1154   Call end time  1156   Discharge diagnosis  Sepsis without acute organ dysfunction    Meds reviewed with patient/caregiver?  Yes   Is the patient having any side effects they believe may be caused by any medication additions or changes?  No   Does the patient have all medications ordered at discharge?  Yes   Is the patient taking all medications as directed (includes completed medication regime)?  Yes   Does the patient have a primary care provider?   Yes   Does the patient have an appointment with their PCP within 7 days of discharge?  Yes   Has the patient kept scheduled appointments due by today?  Yes   Has home health visited the patient within 72 hours of discharge?  N/A   Psychosocial issues?  No   Did the patient receive a copy of their discharge instructions?  Yes   Nursing interventions  Reviewed instructions with patient   What is the patient's perception of their health status since discharge?  Improving   Is the patient/caregiver able to teach back signs and symptoms related to disease process for when to call PCP?  Yes   Is the patient/caregiver able to teach back signs and symptoms related to disease process for when to call 911?  Yes   Is the patient/caregiver able to teach back the hierarchy of who to call/visit for symptoms/problems? PCP, Specialist, Home health nurse, Urgent Care, ED, 911  Yes   Week 1 call completed?  Yes          Shiva Barnes RN

## 2020-12-03 ENCOUNTER — READMISSION MANAGEMENT (OUTPATIENT)
Dept: CALL CENTER | Facility: HOSPITAL | Age: 45
End: 2020-12-03

## 2020-12-03 NOTE — OUTREACH NOTE
Medical Week 2 Survey      Responses   [facility] patient discharged from?  [facility]   Does the patient have one of the following disease processes/diagnoses(primary or secondary)?  Other   Week 2 attempt successful?  Yes   Call start time  1500   Discharge diagnosis  Sepsis without acute organ dysfunction    Call end time  1502   Meds reviewed with patient/caregiver?  Yes   Is the patient having any side effects they believe may be caused by any medication additions or changes?  No   Does the patient have all medications ordered at discharge?  Yes   Is the patient taking all medications as directed (includes completed medication regime)?  Yes   Comments regarding appointments  GI appt on 12/9/20   Does the patient have a primary care provider?   Yes   Does the patient have an appointment with their PCP within 7 days of discharge?  Yes   Comments regarding PCP  11/24/20   Has the patient kept scheduled appointments due by today?  N/A   Psychosocial issues?  No   Did the patient receive a copy of their discharge instructions?  Yes   Nursing interventions  Reviewed instructions with patient   What is the patient's perception of their health status since discharge?  Improving   Is the patient/caregiver able to teach back signs and symptoms related to disease process for when to call PCP?  Yes   Is the patient/caregiver able to teach back signs and symptoms related to disease process for when to call 911?  Yes   Is the patient/caregiver able to teach back the hierarchy of who to call/visit for symptoms/problems? PCP, Specialist, Home health nurse, Urgent Care, ED, 911  Yes   If the patient is a current smoker, are they able to teach back resources for cessation?  Not a smoker   Week 2 Call Completed?  Yes          Zaida Griffin RN

## 2020-12-09 ENCOUNTER — LAB (OUTPATIENT)
Dept: LAB | Facility: HOSPITAL | Age: 45
End: 2020-12-09

## 2020-12-09 ENCOUNTER — OFFICE VISIT (OUTPATIENT)
Dept: GASTROENTEROLOGY | Facility: CLINIC | Age: 45
End: 2020-12-09

## 2020-12-09 VITALS
SYSTOLIC BLOOD PRESSURE: 114 MMHG | DIASTOLIC BLOOD PRESSURE: 61 MMHG | HEIGHT: 64 IN | BODY MASS INDEX: 25.27 KG/M2 | TEMPERATURE: 98.2 F | WEIGHT: 148 LBS | HEART RATE: 82 BPM | RESPIRATION RATE: 16 BRPM

## 2020-12-09 DIAGNOSIS — K51.00 ULCERATIVE PANCOLITIS (HCC): Primary | ICD-10-CM

## 2020-12-09 DIAGNOSIS — D50.0 IRON DEFICIENCY ANEMIA DUE TO CHRONIC BLOOD LOSS: ICD-10-CM

## 2020-12-09 DIAGNOSIS — K51.00 ULCERATIVE PANCOLITIS (HCC): ICD-10-CM

## 2020-12-09 DIAGNOSIS — R10.84 GENERALIZED ABDOMINAL PAIN: ICD-10-CM

## 2020-12-09 LAB
BASOPHILS # BLD AUTO: 0.03 10*3/MM3 (ref 0–0.2)
BASOPHILS NFR BLD AUTO: 0.4 % (ref 0–1.5)
DEPRECATED RDW RBC AUTO: 46.6 FL (ref 37–54)
EOSINOPHIL # BLD AUTO: 0.01 10*3/MM3 (ref 0–0.4)
EOSINOPHIL NFR BLD AUTO: 0.1 % (ref 0.3–6.2)
ERYTHROCYTE [DISTWIDTH] IN BLOOD BY AUTOMATED COUNT: 14.9 % (ref 12.3–15.4)
HCT VFR BLD AUTO: 30.5 % (ref 34–46.6)
HGB BLD-MCNC: 9.6 G/DL (ref 12–15.9)
IMM GRANULOCYTES # BLD AUTO: 0.12 10*3/MM3 (ref 0–0.05)
IMM GRANULOCYTES NFR BLD AUTO: 1.7 % (ref 0–0.5)
LYMPHOCYTES # BLD AUTO: 0.79 10*3/MM3 (ref 0.7–3.1)
LYMPHOCYTES NFR BLD AUTO: 11.2 % (ref 19.6–45.3)
MCH RBC QN AUTO: 27.3 PG (ref 26.6–33)
MCHC RBC AUTO-ENTMCNC: 31.5 G/DL (ref 31.5–35.7)
MCV RBC AUTO: 86.6 FL (ref 79–97)
MONOCYTES # BLD AUTO: 0.22 10*3/MM3 (ref 0.1–0.9)
MONOCYTES NFR BLD AUTO: 3.1 % (ref 5–12)
NEUTROPHILS NFR BLD AUTO: 5.91 10*3/MM3 (ref 1.7–7)
NEUTROPHILS NFR BLD AUTO: 83.5 % (ref 42.7–76)
NRBC BLD AUTO-RTO: 0 /100 WBC (ref 0–0.2)
PLATELET # BLD AUTO: 576 10*3/MM3 (ref 140–450)
PMV BLD AUTO: 9.7 FL (ref 6–12)
RBC # BLD AUTO: 3.52 10*6/MM3 (ref 3.77–5.28)
WBC # BLD AUTO: 7.08 10*3/MM3 (ref 3.4–10.8)

## 2020-12-09 PROCEDURE — 80053 COMPREHEN METABOLIC PANEL: CPT

## 2020-12-09 PROCEDURE — 36415 COLL VENOUS BLD VENIPUNCTURE: CPT

## 2020-12-09 PROCEDURE — 99214 OFFICE O/P EST MOD 30 MIN: CPT | Performed by: INTERNAL MEDICINE

## 2020-12-09 PROCEDURE — 81335 TPMT GENE COM VARIANTS: CPT

## 2020-12-09 PROCEDURE — 85025 COMPLETE CBC W/AUTO DIFF WBC: CPT

## 2020-12-09 RX ORDER — DICYCLOMINE HYDROCHLORIDE 10 MG/1
10 CAPSULE ORAL 3 TIMES DAILY PRN
Qty: 60 CAPSULE | Refills: 1 | Status: SHIPPED | OUTPATIENT
Start: 2020-12-09 | End: 2021-03-10

## 2020-12-09 RX ORDER — VALACYCLOVIR HCL 100 %
POWDER (GRAM) MISCELLANEOUS AS NEEDED
COMMUNITY
End: 2021-03-10

## 2020-12-09 RX ORDER — MESALAMINE 0.38 G/1
CAPSULE, EXTENDED RELEASE ORAL
Qty: 120 CAPSULE | Refills: 5 | Status: SHIPPED | OUTPATIENT
Start: 2020-12-09 | End: 2021-03-10 | Stop reason: SDUPTHER

## 2020-12-09 NOTE — PROGRESS NOTES
Follow Up Note     Date: 2020   Patient Name: Stacy Guardado  MRN: [id number]  : 1975     Referring Physician: Shayna Swartz PA    Chief Complaint:    Chief Complaint   Patient presents with   • Hospital Follow Up Visit   • Diarrhea     bloody   • Nausea   • Vomiting       Interval History:   2020  Stacy Guardado is a 45 y.o. female who is here today for follow up for IBD diarrhea. Her diarrhea improved. 4-5 times small soft stool without blood and 3-4 times soft small stool at night.  She will get occasional cramps diffuse.  She was recently discharged from the hospital after being treated for recurrent diarrhea blood in the stool.  She is currently taking prednisone tapering dose  She had a colonoscopy and she is here to discuss the pathology report and further management..     11/10/2020  Stacy Guardado is a 45 y.o. female who is here today to establish care with Gastroenterology for evaluation of recent diarrhea with blood.   She states that she noted to have red blood in stool three weeks ago and that got worse with loose stool.She also develeloped LLQ abdominal pain. Loose stool 5-7 times daily. Deny any fever chills. Patient was in emergency room 2 weeks ago with complaints of diarrhea abdominal pain and blood in the stool.  She had basic lab work done which revealed borderline leukocytosis.  She deny any prior constipation. She also had a CT scan of the abdomen pelvis done which revealed thickening in the descending colon the sigmoid suggesting colitis. She was given Augmentin for ten days.    Weight is stable. Pt denies nausea vomiting or odynophagia or dysphagia. There is no history of acid reflux. There is no history of anemia. No prior history of EGD or colonoscopy. No family history of colon cancer or any GI malignancy, or IBD. No history of any abdominal surgery. Denies alcohol abuse or cigarette smoking.     Subjective      Past Medical History:   Past  Medical History:   Diagnosis Date   • Abnormal ECG    • Bipolar affective (CMS/HCC)    • COPD (chronic obstructive pulmonary disease) (CMS/HCC)    • Current every day smoker     Patient states former smoker   • Palpitations    • Snores     very little   • Tattoos 2020    x's 3   • Tattoos      Past Surgical History:   Past Surgical History:   Procedure Laterality Date   • COLONOSCOPY N/A 2020    Procedure: COLONOSCOPY WITH BIOPSIES;  Surgeon: Susan Brizuela MD;  Location: [facility] ENDOSCOPY;  Service: Gastroenterology;  Laterality: N/A;   • WISDOM TOOTH EXTRACTION         Family History:   Family History   Problem Relation Age of Onset   • Hyperlipidemia Mother    • Hyperlipidemia Father    • Hypertension Father    • Stroke Brother    • Heart attack Maternal Grandfather    • Heart disease Maternal Grandfather    • Heart disease Paternal Grandfather    • Heart attack Paternal Grandfather    • Hyperlipidemia Brother    • Alcohol abuse Other    • Diabetes Other    • Hypertension Other    • Stroke Other    • Colon cancer Neg Hx    • Cirrhosis Neg Hx    • Liver cancer Neg Hx    • Liver disease Neg Hx        Social History:   Social History     Socioeconomic History   • Marital status:      Spouse name: Not on file   • Number of children: Not on file   • Years of education: Not on file   • Highest education level: Not on file   Tobacco Use   • Smoking status: Former Smoker     Packs/day: 1.00     Types: Cigarettes     Quit date: 2019     Years since quittin.6   • Smokeless tobacco: Never Used   Substance and Sexual Activity   • Alcohol use: Yes     Comment: socially   • Drug use: No   • Sexual activity: Defer       Medications:     Current Outpatient Medications:   •  Acetaminophen (TYLENOL PO), Take  by mouth Daily As Needed., Disp: , Rfl:   •  ferrous gluconate (FERGON) 324 MG tablet, Take 1 tablet by mouth Daily With Breakfast, Disp: 30 tablet, Rfl: 0  •  predniSONE (DELTASONE) 10 MG  "tablet, Take 4 tablets by mouth daily X 1 week, then 3 tabs daily X 1 week, then 2 tabs daily X 1 week, then 1 tab daily X 1 week, then 1/2 tab daily X 1 week, Disp: 74 tablet, Rfl: 0  •  valACYclovir HCl powder, As Needed., Disp: , Rfl:     Allergies:   Allergies   Allergen Reactions   • Augmentin [Amoxicillin-Pot Clavulanate] Diarrhea and GI Intolerance       Review of Systems:   Review of Systems   Constitutional: Negative for appetite change, fatigue and unexpected weight loss.   HENT: Negative for trouble swallowing.    Gastrointestinal: Positive for abdominal pain, blood in stool and diarrhea. Negative for abdominal distention, anal bleeding, constipation, nausea, rectal pain, vomiting, GERD and indigestion.       The following portions of the patient's history were reviewed and updated as appropriate: allergies, current medications, past family history, past medical history, past social history, past surgical history and problem list.    Objective     Physical Exam:  Vital Signs:   Vitals:    12/09/20 1337   BP: 114/61   Pulse: 82   Resp: 16   Temp: 98.2 °F (36.8 °C)   Weight: 67.1 kg (148 lb)   Height: 162.6 cm (64\")       Physical Exam  Vitals signs and nursing note reviewed.   Constitutional:       Appearance: Normal appearance. She is well-developed.   Eyes:      Comments: Pallor present   Neck:      Musculoskeletal: Normal range of motion and neck supple.   Cardiovascular:      Rate and Rhythm: Normal rate and regular rhythm.      Heart sounds: No murmur.   Pulmonary:      Effort: Pulmonary effort is normal. No respiratory distress.      Breath sounds: Normal breath sounds.   Abdominal:      General: Bowel sounds are normal. There is no distension.      Palpations: Abdomen is soft. There is no mass.      Tenderness: There is no abdominal tenderness.      Hernia: No hernia is present.   Lymphadenopathy:      Cervical: No cervical adenopathy.   Skin:     General: Skin is warm and dry.   Neurological:      " General: No focal deficit present.      Mental Status: She is alert and oriented to person, place, and time.      Sensory: No sensory deficit.         Results Review:   I reviewed the patient's new clinical results.    No results displayed because visit has over 200 results.      Lab on 11/12/2020   Component Date Value Ref Range Status   • SARS-CoV-2 ALEN 11/12/2020 Not Detected  Not Detected Final   Lab on 11/10/2020   Component Date Value Ref Range Status   • C. Difficile Toxins by PCR 11/11/2020 Not Detected  Not Detected Final   • Campylobacter 11/11/2020 Not Detected  Not Detected Final   • Plesiomonas shigelloides 11/11/2020 Not Detected  Not Detected Final   • Salmonella 11/11/2020 Not Detected  Not Detected Final   • Vibrio 11/11/2020 Not Detected  Not Detected Final   • Vibrio cholerae 11/11/2020 Not Detected  Not Detected Final   • Yersinia enterocolitica 11/11/2020 Not Detected  Not Detected Final   • Enteroaggregative E. coli (EAEC) 11/11/2020 Not Detected  Not Detected Final   • Enteropathogenic E. coli (EPEC) 11/11/2020 Not Detected  Not Detected Final   • Enterotoxigenic E. coli (ETEC) lt/* 11/11/2020 Not Detected  Not Detected Final   • Shiga-like toxin-producing E. coli* 11/11/2020 Not Detected  Not Detected Final   • E. coli O157 11/11/2020 Not Detected  Not Detected Final   • Shigella/Enteroinvasive E. coli (E* 11/11/2020 Not Detected  Not Detected Final   • Cryptosporidium 11/11/2020 Not Detected  Not Detected Final   • Cyclospora cayetanensis 11/11/2020 Not Detected  Not Detected Final   • Entamoeba histolytica 11/11/2020 Not Detected  Not Detected Final   • Giardia lamblia 11/11/2020 Not Detected  Not Detected Final   • Adenovirus F40/41 11/11/2020 Not Detected  Not Detected Final   • Astrovirus 11/11/2020 Not Detected  Not Detected Final   • Norovirus GI/GII 11/11/2020 Not Detected  Not Detected Final   • Rotavirus A 11/11/2020 Not Detected  Not Detected Final   • Sapovirus (I, II, IV or V)  11/11/2020 Not Detected  Not Detected Final   Admission on 10/31/2020, Discharged on 10/31/2020   Component Date Value Ref Range Status   • Glucose 10/31/2020 100* 65 - 99 mg/dL Final   • BUN 10/31/2020 7  6 - 20 mg/dL Final   • Creatinine 10/31/2020 0.71  0.57 - 1.00 mg/dL Final   • Sodium 10/31/2020 138  136 - 145 mmol/L Final   • Potassium 10/31/2020 4.0  3.5 - 5.2 mmol/L Final   • Chloride 10/31/2020 103  98 - 107 mmol/L Final   • CO2 10/31/2020 23.5  22.0 - 29.0 mmol/L Final   • Calcium 10/31/2020 9.2  8.6 - 10.5 mg/dL Final   • Total Protein 10/31/2020 7.0  6.0 - 8.5 g/dL Final   • Albumin 10/31/2020 3.80  3.50 - 5.20 g/dL Final   • ALT (SGPT) 10/31/2020 11  1 - 33 U/L Final   • AST (SGOT) 10/31/2020 16  1 - 32 U/L Final   • Alkaline Phosphatase 10/31/2020 95  39 - 117 U/L Final   • Total Bilirubin 10/31/2020 0.2  0.0 - 1.2 mg/dL Final   • eGFR Non African Amer 10/31/2020 90  >60 mL/min/1.73 Final   • Globulin 10/31/2020 3.2  gm/dL Final   • A/G Ratio 10/31/2020 1.2  g/dL Final   • BUN/Creatinine Ratio 10/31/2020 9.9  7.0 - 25.0 Final   • Anion Gap 10/31/2020 11.5  5.0 - 15.0 mmol/L Final   • Protime 10/31/2020 14.1  12.0 - 15.1 Seconds Final   • INR 10/31/2020 1.05  0.90 - 1.10 Final   • ABO Type 10/31/2020 A   Final   • RH type 10/31/2020 Positive   Final   • Antibody Screen 10/31/2020 Negative   Final   • T&S Expiration Date 10/31/2020 11/3/2020 11:59:59 PM   Final   • Extra Tube 10/31/2020 hold for add-on   Final    Auto resulted   • Extra Tube 10/31/2020 Hold for add-ons.   Final    Auto resulted.   • Extra Tube 10/31/2020 hold for add-on   Final    Auto resulted   • Extra Tube 10/31/2020 Hold for add-ons.   Final    Auto resulted.   • WBC 10/31/2020 10.74  3.40 - 10.80 10*3/mm3 Final   • RBC 10/31/2020 4.11  3.77 - 5.28 10*6/mm3 Final   • Hemoglobin 10/31/2020 12.4  12.0 - 15.9 g/dL Final   • Hematocrit 10/31/2020 38.4  34.0 - 46.6 % Final   • MCV 10/31/2020 93.4  79.0 - 97.0 fL Final   • MCH 10/31/2020  30.2  26.6 - 33.0 pg Final   • MCHC 10/31/2020 32.3  31.5 - 35.7 g/dL Final   • RDW 10/31/2020 12.9  12.3 - 15.4 % Final   • RDW-SD 10/31/2020 44.1  37.0 - 54.0 fl Final   • MPV 10/31/2020 10.7  6.0 - 12.0 fL Final   • Platelets 10/31/2020 305  140 - 450 10*3/mm3 Final   • Neutrophil % 10/31/2020 70.1  42.7 - 76.0 % Final   • Lymphocyte % 10/31/2020 14.8* 19.6 - 45.3 % Final   • Monocyte % 10/31/2020 9.6  5.0 - 12.0 % Final   • Eosinophil % 10/31/2020 4.5  0.3 - 6.2 % Final   • Basophil % 10/31/2020 0.5  0.0 - 1.5 % Final   • Immature Grans % 10/31/2020 0.5  0.0 - 0.5 % Final   • Neutrophils, Absolute 10/31/2020 7.54* 1.70 - 7.00 10*3/mm3 Final   • Lymphocytes, Absolute 10/31/2020 1.59  0.70 - 3.10 10*3/mm3 Final   • Monocytes, Absolute 10/31/2020 1.03* 0.10 - 0.90 10*3/mm3 Final   • Eosinophils, Absolute 10/31/2020 0.48* 0.00 - 0.40 10*3/mm3 Final   • Basophils, Absolute 10/31/2020 0.05  0.00 - 0.20 10*3/mm3 Final   • Immature Grans, Absolute 10/31/2020 0.05  0.00 - 0.05 10*3/mm3 Final   • nRBC 10/31/2020 0.0  0.0 - 0.2 /100 WBC Final      Ct Abdomen Pelvis With Contrast    Result Date: 11/15/2020  Prominent colitis without obstruction.  Recommend appropriate follow-up Authenticated by Rogerio Webster MD on 11/15/2020 10:58:05 PM    Ct Abdomen Pelvis With Contrast    Result Date: 10/31/2020  Mucosal thickening is noted in the sigmoid colon and descending colon which may represent an infectious or inflammatory process. Endoscopic follow-up recommended.    This study was performed with techniques to keep radiation doses as low as reasonably achievable (ALARA). Individualized dose reduction techniques using automated exposure control or adjustment of mA and/or kV according to the patient size were employed.  This report was finalized on 10/31/2020 11:15 AM by Aakash Valdovinos DO.    Xr Chest 1 View    Result Date: 11/16/2020  Low lung volumes otherwise no acute cardiopulmonary process.  Continued followup is  recommended.      Images were reviewed, interpreted, and dictated by Dr. Waqas Yip M.D. Transcribed by Kit Baez PA-C.  This report was finalized on 11/16/2020 2:05 PM by Waqas Yip M.D..      Assessment / Plan      1.  Ulcerative pancolitis  12/9/2020   she had a colonoscopy done on 11/17/2020 which revealed extensive diffuse colitis involving the colon up to the hepatic flexure from the rectum.  Dispersed inflammation noted from hyperflexion to the cecum.  Anaspaz normal.  Examined terminal ileum for about 15 cm from IC valve was normal.   Pathology revealed acute severe colitis with crypt abscess.  Patient had chronic diarrhea more than 2 months. These findings are more in favor of ulcerative colitis.   Recent TB Gold test was negative.  Chronic hepatitis panel was negative for any chronic hep C or hep B infection.   She is currently on a tapering dose of steroids  She would definitely benefit from Biologics however at this time patient's insurance does not cover Remicade or the Humira or newer Biologics.   We will start her on Apriso along with the Imuran and see how she does with that regimen.   If no significant improvement with that for the next 6 months to 12 months we will consider Biologics and get a prior authorization  Started on Apriso 0.375 g p.o. 4 capsules daily  We will add Imuran 100 mg p.o. daily when once a TPMT genetics available  She is immune to hepatitis A but not to hepatitis B.  She needs a hep B vaccine as outpatient with the booster  Advised flu vaccine and COVID-19 vaccine    11/10/2020  Acute episode of bloody diarrhea, which has progressed now to chronic.  Symptoms more suggestive infective colitis however IBD cannot be ruled out.   She states that she finished 10-day course of Augmentin given at the ED but she still has ongoing bloody diarrhea with minimal left lower quadrant abdominal pain.   Her basic lab work done in the emergency room 2 weeks ago did not reveal  any significant abnormality except borderline leukocytosis.  CT of the abdomen pelvis done revealed a thickened descending colon and the sigmoid colon suggesting colitis.  No family history of any IBD.   Given her persistent symptoms we will schedule her for colonoscopy for further evaluation  We will also get a stool for C. difficile and the stool for GI panel     2.  Iron deficiency anemia  She had a significant bloody diarrhea causing severe anemia  Patient is currently on iron pills.  We will get a CBC today  We will continue the iron pills           Follow Up:   No follow-ups on file.    Susan Brizuela MD  [facility] [place]  12/9/2020  13:43 EST     Please note that portions of this note may have been completed with a voice recognition program.

## 2020-12-10 LAB
ALBUMIN SERPL-MCNC: 3.6 G/DL (ref 3.5–5.2)
ALBUMIN/GLOB SERPL: 0.9 G/DL
ALP SERPL-CCNC: 80 U/L (ref 39–117)
ALT SERPL W P-5'-P-CCNC: 46 U/L (ref 1–33)
ANION GAP SERPL CALCULATED.3IONS-SCNC: 11.2 MMOL/L (ref 5–15)
AST SERPL-CCNC: 26 U/L (ref 1–32)
BILIRUB SERPL-MCNC: <0.2 MG/DL (ref 0–1.2)
BUN SERPL-MCNC: 8 MG/DL (ref 6–20)
BUN/CREAT SERPL: 13.8 (ref 7–25)
CALCIUM SPEC-SCNC: 8.7 MG/DL (ref 8.6–10.5)
CHLORIDE SERPL-SCNC: 98 MMOL/L (ref 98–107)
CO2 SERPL-SCNC: 24.8 MMOL/L (ref 22–29)
CREAT SERPL-MCNC: 0.58 MG/DL (ref 0.57–1)
GFR SERPL CREATININE-BSD FRML MDRD: 113 ML/MIN/1.73
GLOBULIN UR ELPH-MCNC: 3.8 GM/DL
GLUCOSE SERPL-MCNC: 134 MG/DL (ref 65–99)
POTASSIUM SERPL-SCNC: 4.2 MMOL/L (ref 3.5–5.2)
PROT SERPL-MCNC: 7.4 G/DL (ref 6–8.5)
SODIUM SERPL-SCNC: 134 MMOL/L (ref 136–145)

## 2020-12-12 LAB
LAB AP CASE REPORT: NORMAL
PATH REPORT.FINAL DX SPEC: NORMAL

## 2020-12-14 ENCOUNTER — TELEPHONE (OUTPATIENT)
Dept: GASTROENTEROLOGY | Facility: CLINIC | Age: 45
End: 2020-12-14

## 2020-12-14 LAB
DIAGNOSTIC IMP SPEC-IMP: NORMAL
LABORATORY COMMENT REPORT: NORMAL
TPMT GENE MUT ANL BLD/T: NORMAL
TPMT GENE MUT ANL BLD/T: NORMAL

## 2020-12-14 RX ORDER — ONDANSETRON 4 MG/1
4 TABLET, FILM COATED ORAL EVERY 8 HOURS PRN
Qty: 30 TABLET | Refills: 0 | Status: SHIPPED | OUTPATIENT
Start: 2020-12-14 | End: 2021-03-10

## 2020-12-14 RX ORDER — OMEPRAZOLE 20 MG/1
20 TABLET, DELAYED RELEASE ORAL DAILY
Qty: 30 TABLET | Refills: 2 | Status: SHIPPED | OUTPATIENT
Start: 2020-12-14 | End: 2021-03-10

## 2020-12-14 NOTE — TELEPHONE ENCOUNTER
Patient is having N/V, abd pain for 2-3 days not able to keep anything down, should she continue with the iron pills and prednisone , not able to keep them down at all, any advice?

## 2020-12-15 ENCOUNTER — HOSPITAL ENCOUNTER (EMERGENCY)
Facility: HOSPITAL | Age: 45
Discharge: HOME | End: 2020-12-15
Attending: EMERGENCY MEDICINE | Admitting: EMERGENCY MEDICINE

## 2020-12-15 ENCOUNTER — APPOINTMENT (OUTPATIENT)
Dept: CT IMAGING | Facility: HOSPITAL | Age: 45
End: 2020-12-15

## 2020-12-15 VITALS
DIASTOLIC BLOOD PRESSURE: 69 MMHG | OXYGEN SATURATION: 97 % | TEMPERATURE: 98.2 F | SYSTOLIC BLOOD PRESSURE: 113 MMHG | BODY MASS INDEX: 24.07 KG/M2 | WEIGHT: 141 LBS | HEIGHT: 64 IN | HEART RATE: 101 BPM | RESPIRATION RATE: 16 BRPM

## 2020-12-15 DIAGNOSIS — K51.90 ACUTE ULCERATIVE COLITIS WITHOUT COMPLICATIONS (HCC): ICD-10-CM

## 2020-12-15 DIAGNOSIS — R11.2 NAUSEA AND VOMITING IN ADULT: Primary | ICD-10-CM

## 2020-12-15 DIAGNOSIS — K52.9 COLITIS: ICD-10-CM

## 2020-12-15 LAB
ALBUMIN SERPL-MCNC: 3.3 G/DL (ref 3.5–5.2)
ALBUMIN/GLOB SERPL: 0.9 G/DL
ALP SERPL-CCNC: 95 U/L (ref 39–117)
ALT SERPL W P-5'-P-CCNC: 14 U/L (ref 1–33)
ANION GAP SERPL CALCULATED.3IONS-SCNC: 13.8 MMOL/L (ref 5–15)
AST SERPL-CCNC: 9 U/L (ref 1–32)
BILIRUB SERPL-MCNC: 0.5 MG/DL (ref 0–1.2)
BUN SERPL-MCNC: 15 MG/DL (ref 6–20)
BUN/CREAT SERPL: 17.4 (ref 7–25)
CALCIUM SPEC-SCNC: 9.1 MG/DL (ref 8.6–10.5)
CHLORIDE SERPL-SCNC: 94 MMOL/L (ref 98–107)
CO2 SERPL-SCNC: 23.2 MMOL/L (ref 22–29)
CREAT SERPL-MCNC: 0.86 MG/DL (ref 0.57–1)
DEPRECATED RDW RBC AUTO: 52.5 FL (ref 37–54)
ERYTHROCYTE [DISTWIDTH] IN BLOOD BY AUTOMATED COUNT: 16.4 % (ref 12.3–15.4)
GFR SERPL CREATININE-BSD FRML MDRD: 72 ML/MIN/1.73
GLOBULIN UR ELPH-MCNC: 3.7 GM/DL
GLUCOSE SERPL-MCNC: 134 MG/DL (ref 65–99)
HCT VFR BLD AUTO: 35 % (ref 34–46.6)
HGB BLD-MCNC: 11 G/DL (ref 12–15.9)
LIPASE SERPL-CCNC: 13 U/L (ref 13–60)
LYMPHOCYTES # BLD MANUAL: 0.87 10*3/MM3 (ref 0.7–3.1)
LYMPHOCYTES NFR BLD MANUAL: 6 % (ref 19.6–45.3)
LYMPHOCYTES NFR BLD MANUAL: 8 % (ref 5–12)
MCH RBC QN AUTO: 27.4 PG (ref 26.6–33)
MCHC RBC AUTO-ENTMCNC: 31.4 G/DL (ref 31.5–35.7)
MCV RBC AUTO: 87.1 FL (ref 79–97)
METAMYELOCYTES NFR BLD MANUAL: 6 % (ref 0–0)
MONOCYTES # BLD AUTO: 1.16 10*3/MM3 (ref 0.1–0.9)
MYELOCYTES NFR BLD MANUAL: 1 % (ref 0–0)
NEUTROPHILS # BLD AUTO: 11.47 10*3/MM3 (ref 1.7–7)
NEUTROPHILS NFR BLD MANUAL: 49 % (ref 42.7–76)
NEUTS BAND NFR BLD MANUAL: 30 % (ref 0–5)
NEUTS VAC BLD QL SMEAR: ABNORMAL
PLATELET # BLD AUTO: 606 10*3/MM3 (ref 140–450)
PMV BLD AUTO: 8.8 FL (ref 6–12)
POTASSIUM SERPL-SCNC: 4.5 MMOL/L (ref 3.5–5.2)
PROT SERPL-MCNC: 7 G/DL (ref 6–8.5)
RBC # BLD AUTO: 4.02 10*6/MM3 (ref 3.77–5.28)
RBC MORPH BLD: NORMAL
SCAN SLIDE: NORMAL
SMALL PLATELETS BLD QL SMEAR: ABNORMAL
SODIUM SERPL-SCNC: 131 MMOL/L (ref 136–145)
TOXIC GRANULATION: ABNORMAL
TROPONIN T SERPL-MCNC: <0.01 NG/ML (ref 0–0.03)
WBC # BLD AUTO: 14.52 10*3/MM3 (ref 3.4–10.8)

## 2020-12-15 PROCEDURE — 96372 THER/PROPH/DIAG INJ SC/IM: CPT

## 2020-12-15 PROCEDURE — 85025 COMPLETE CBC W/AUTO DIFF WBC: CPT | Performed by: PHYSICIAN ASSISTANT

## 2020-12-15 PROCEDURE — 83690 ASSAY OF LIPASE: CPT | Performed by: PHYSICIAN ASSISTANT

## 2020-12-15 PROCEDURE — 85007 BL SMEAR W/DIFF WBC COUNT: CPT | Performed by: PHYSICIAN ASSISTANT

## 2020-12-15 PROCEDURE — 99283 EMERGENCY DEPT VISIT LOW MDM: CPT

## 2020-12-15 PROCEDURE — 84484 ASSAY OF TROPONIN QUANT: CPT | Performed by: PHYSICIAN ASSISTANT

## 2020-12-15 PROCEDURE — 96374 THER/PROPH/DIAG INJ IV PUSH: CPT

## 2020-12-15 PROCEDURE — 80053 COMPREHEN METABOLIC PANEL: CPT | Performed by: PHYSICIAN ASSISTANT

## 2020-12-15 PROCEDURE — 25010000002 IOPAMIDOL 61 % SOLUTION: Performed by: EMERGENCY MEDICINE

## 2020-12-15 PROCEDURE — 74177 CT ABD & PELVIS W/CONTRAST: CPT

## 2020-12-15 PROCEDURE — 93005 ELECTROCARDIOGRAM TRACING: CPT | Performed by: PHYSICIAN ASSISTANT

## 2020-12-15 PROCEDURE — 25010000002 METHYLPREDNISOLONE PER 125 MG: Performed by: PHYSICIAN ASSISTANT

## 2020-12-15 PROCEDURE — 25010000002 ONDANSETRON PER 1 MG: Performed by: PHYSICIAN ASSISTANT

## 2020-12-15 RX ORDER — METHYLPREDNISOLONE SODIUM SUCCINATE 125 MG/2ML
125 INJECTION, POWDER, LYOPHILIZED, FOR SOLUTION INTRAMUSCULAR; INTRAVENOUS ONCE
Status: COMPLETED | OUTPATIENT
Start: 2020-12-15 | End: 2020-12-15

## 2020-12-15 RX ORDER — PROMETHAZINE HYDROCHLORIDE 25 MG/1
25 TABLET ORAL EVERY 6 HOURS PRN
Qty: 20 TABLET | Refills: 0 | Status: SHIPPED | OUTPATIENT
Start: 2020-12-15 | End: 2021-03-10

## 2020-12-15 RX ORDER — CIPROFLOXACIN 500 MG/1
500 TABLET, FILM COATED ORAL 2 TIMES DAILY
Qty: 20 TABLET | Refills: 0 | Status: SHIPPED | OUTPATIENT
Start: 2020-12-15 | End: 2021-03-10

## 2020-12-15 RX ORDER — ONDANSETRON 4 MG/1
4 TABLET, ORALLY DISINTEGRATING ORAL EVERY 6 HOURS PRN
Qty: 12 TABLET | Refills: 0 | Status: SHIPPED | OUTPATIENT
Start: 2020-12-15 | End: 2021-03-10

## 2020-12-15 RX ORDER — SODIUM CHLORIDE 0.9 % (FLUSH) 0.9 %
10 SYRINGE (ML) INJECTION AS NEEDED
Status: DISCONTINUED | OUTPATIENT
Start: 2020-12-15 | End: 2020-12-15 | Stop reason: HOSPADM

## 2020-12-15 RX ORDER — CIPROFLOXACIN 500 MG/1
500 TABLET, FILM COATED ORAL ONCE
Status: COMPLETED | OUTPATIENT
Start: 2020-12-15 | End: 2020-12-15

## 2020-12-15 RX ORDER — ONDANSETRON 2 MG/ML
4 INJECTION INTRAMUSCULAR; INTRAVENOUS ONCE
Status: COMPLETED | OUTPATIENT
Start: 2020-12-15 | End: 2020-12-15

## 2020-12-15 RX ORDER — METRONIDAZOLE 500 MG/1
500 TABLET ORAL ONCE
Status: COMPLETED | OUTPATIENT
Start: 2020-12-15 | End: 2020-12-15

## 2020-12-15 RX ORDER — METRONIDAZOLE 500 MG/1
500 TABLET ORAL 2 TIMES DAILY
Qty: 20 TABLET | Refills: 0 | Status: SHIPPED | OUTPATIENT
Start: 2020-12-15 | End: 2021-03-10

## 2020-12-15 RX ADMIN — METRONIDAZOLE 500 MG: 500 TABLET ORAL at 17:55

## 2020-12-15 RX ADMIN — SODIUM CHLORIDE 1000 ML: 9 INJECTION, SOLUTION INTRAVENOUS at 14:38

## 2020-12-15 RX ADMIN — IOPAMIDOL 100 ML: 612 INJECTION, SOLUTION INTRAVENOUS at 16:50

## 2020-12-15 RX ADMIN — CIPROFLOXACIN HYDROCHLORIDE 500 MG: 500 TABLET, FILM COATED ORAL at 17:55

## 2020-12-15 RX ADMIN — METHYLPREDNISOLONE SODIUM SUCCINATE 125 MG: 125 INJECTION, POWDER, FOR SOLUTION INTRAMUSCULAR; INTRAVENOUS at 14:54

## 2020-12-15 RX ADMIN — ONDANSETRON 4 MG: 2 INJECTION INTRAMUSCULAR; INTRAVENOUS at 14:54

## 2020-12-15 NOTE — ED PROVIDER NOTES
Subjective   This is a 45-year-old female that presents to the emergency department chief complaint nausea, vomiting x2 days.  Patient states she has had upper abdominal discomfort.  Patient complains of gnawing, burning epigastric abdominal pain.  Patient does have history of gastric ulcers as well as ulcerative colitis.  Patient denies any associated chest pain, shortness of breath, fever, chills, body aches.      History provided by:  Patient   used: No    Vomiting  The primary symptoms include abdominal pain, nausea and vomiting. Primary symptoms do not include fever, weight loss, fatigue, hematemesis, jaundice, hematochezia, myalgias or arthralgias. The illness began yesterday. The onset was sudden.   The illness is also significant for chills. The illness does not include odynophagia, bloating or back pain. Associated medical issues do not include inflammatory bowel disease, GERD, gallstones, liver disease, gastric bypass, bowel resection, irritable bowel syndrome or hemorrhoids.       Review of Systems   Constitutional: Positive for chills. Negative for fatigue, fever and weight loss.   Eyes: Negative.  Negative for photophobia, discharge, redness and itching.   Respiratory: Negative.  Negative for apnea and shortness of breath.    Cardiovascular: Negative for chest pain and leg swelling.   Gastrointestinal: Positive for abdominal distention, abdominal pain, nausea and vomiting. Negative for bloating, hematemesis, hematochezia and jaundice.   Endocrine: Negative.  Negative for cold intolerance, heat intolerance and polydipsia.   Musculoskeletal: Negative for arthralgias, back pain, joint swelling and myalgias.   Skin: Negative for color change, pallor and wound.   Hematological: Negative.  Negative for adenopathy. Does not bruise/bleed easily.   Psychiatric/Behavioral: Negative.  Negative for behavioral problems, confusion, decreased concentration and hallucinations.   All other systems  reviewed and are negative.      Past Medical History:   Diagnosis Date   • Abnormal ECG    • Bipolar affective (CMS/HCC)    • COPD (chronic obstructive pulmonary disease) (CMS/HCC)    • Current every day smoker     Patient states former smoker   • Palpitations    • Snores     very little   • Tattoos 2020    x's 3   • Tattoos        Allergies   Allergen Reactions   • Augmentin [Amoxicillin-Pot Clavulanate] Diarrhea and GI Intolerance       Past Surgical History:   Procedure Laterality Date   • COLONOSCOPY N/A 2020    Procedure: COLONOSCOPY WITH BIOPSIES;  Surgeon: Susan Brizuela MD;  Location: [facility] ENDOSCOPY;  Service: Gastroenterology;  Laterality: N/A;   • WISDOM TOOTH EXTRACTION         Family History   Problem Relation Age of Onset   • Hyperlipidemia Mother    • Hyperlipidemia Father    • Hypertension Father    • Stroke Brother    • Heart attack Maternal Grandfather    • Heart disease Maternal Grandfather    • Heart disease Paternal Grandfather    • Heart attack Paternal Grandfather    • Hyperlipidemia Brother    • Alcohol abuse Other    • Diabetes Other    • Hypertension Other    • Stroke Other    • Colon cancer Neg Hx    • Cirrhosis Neg Hx    • Liver cancer Neg Hx    • Liver disease Neg Hx        Social History     Socioeconomic History   • Marital status:      Spouse name: Not on file   • Number of children: Not on file   • Years of education: Not on file   • Highest education level: Not on file   Tobacco Use   • Smoking status: Former Smoker     Packs/day: 1.00     Types: Cigarettes     Quit date: 2019     Years since quittin.6   • Smokeless tobacco: Never Used   Substance and Sexual Activity   • Alcohol use: Yes     Comment: socially   • Drug use: No   • Sexual activity: Defer           Objective   Physical Exam  Vitals signs and nursing note reviewed.   Constitutional:       General: She is not in acute distress.     Appearance: Normal appearance. She is normal weight.  She is not ill-appearing or toxic-appearing.   HENT:      Head: Normocephalic and atraumatic.      Right Ear: Tympanic membrane, ear canal and external ear normal. There is no impacted cerumen.      Left Ear: Tympanic membrane, ear canal and external ear normal. There is no impacted cerumen.      Nose: Nose normal. No congestion or rhinorrhea.      Mouth/Throat:      Mouth: Mucous membranes are moist.      Pharynx: Oropharynx is clear. No oropharyngeal exudate or posterior oropharyngeal erythema.   Eyes:      General: No scleral icterus.        Right eye: No discharge.         Left eye: No discharge.      Extraocular Movements: Extraocular movements intact.      Conjunctiva/sclera: Conjunctivae normal.      Pupils: Pupils are equal, round, and reactive to light.   Neck:      Musculoskeletal: Normal range of motion and neck supple. No neck rigidity or muscular tenderness.      Vascular: No carotid bruit.   Cardiovascular:      Rate and Rhythm: Normal rate and regular rhythm.      Pulses: Normal pulses.      Heart sounds: Normal heart sounds. No murmur. No friction rub. No gallop.    Pulmonary:      Effort: Pulmonary effort is normal. No respiratory distress.      Breath sounds: Normal breath sounds. No stridor. No wheezing, rhonchi or rales.   Chest:      Chest wall: No tenderness.   Abdominal:      General: Abdomen is flat. Bowel sounds are normal. There is distension.      Palpations: There is no mass.      Tenderness: There is abdominal tenderness. There is no right CVA tenderness, left CVA tenderness, guarding or rebound.      Hernia: No hernia is present.   Musculoskeletal: Normal range of motion.         General: No swelling, tenderness, deformity or signs of injury.      Right lower leg: No edema.   Lymphadenopathy:      Cervical: No cervical adenopathy.   Skin:     General: Skin is warm and dry.      Capillary Refill: Capillary refill takes less than 2 seconds.      Coloration: Skin is not jaundiced or pale.       Findings: No bruising, erythema, lesion or rash.   Neurological:      General: No focal deficit present.      Mental Status: She is alert and oriented to person, place, and time.      Cranial Nerves: No cranial nerve deficit.      Sensory: No sensory deficit.      Motor: No weakness.      Coordination: Coordination normal.      Gait: Gait normal.   Psychiatric:         Mood and Affect: Mood normal.         Behavior: Behavior normal.         Thought Content: Thought content normal.         Judgment: Judgment normal.         Procedures           ED Course  ED Course as of Dec 15 1745   Tue Dec 15, 2020   1609 EKG interpreted by me reveals sinus tachycardia rate 139.  No acute ischemic change.    [PF]   1731 IMPRESSION:  Acute colitis, most severe involving the ascending colon.  Mild  free fluid.  Recommend appropriate close follow-up    [BH]   1731 45-year-old female who presents the emergency department chief complaint nausea, vomiting, patient does have history of ulcerative colitis.  CT scan does show acute colitis.  Patient be started on oral antibiotics.  She is instructed to return to emergency department if her condition worsens.    [BH]      ED Course User Index  [BH] Danilo Duran PA-C  [PF] Lio Monteiro,                                            [facility]    Final diagnoses:   Nausea and vomiting in adult   Colitis   Acute ulcerative colitis without complications (CMS/[facility])            Danilo Duran PA-C  12/15/20 [id number]

## 2020-12-15 NOTE — ED NOTES
Patient tried to obtain a urine specimen but stated she was unable to.      Janie Yee  12/15/20 [id number]

## 2020-12-16 ENCOUNTER — TELEPHONE (OUTPATIENT)
Dept: GASTROENTEROLOGY | Facility: CLINIC | Age: 45
End: 2020-12-16

## 2020-12-16 RX ORDER — AZATHIOPRINE 50 MG/1
100 TABLET ORAL DAILY
Qty: 60 TABLET | Refills: 2 | Status: SHIPPED | OUTPATIENT
Start: 2020-12-16 | End: 2021-03-10 | Stop reason: SDDI

## 2020-12-16 NOTE — TELEPHONE ENCOUNTER
Pt seen in  ER, was given 2 antibiotics-Cipro and Flagyl, she asks if she still needs to continue with her Apriso

## 2021-03-10 ENCOUNTER — LAB (OUTPATIENT)
Dept: LAB | Facility: HOSPITAL | Age: 46
End: 2021-03-10

## 2021-03-10 ENCOUNTER — OFFICE VISIT (OUTPATIENT)
Dept: GASTROENTEROLOGY | Facility: CLINIC | Age: 46
End: 2021-03-10

## 2021-03-10 VITALS
HEIGHT: 64 IN | BODY MASS INDEX: 25.27 KG/M2 | SYSTOLIC BLOOD PRESSURE: 140 MMHG | HEART RATE: 111 BPM | DIASTOLIC BLOOD PRESSURE: 73 MMHG | TEMPERATURE: 98.4 F | RESPIRATION RATE: 16 BRPM | WEIGHT: 148 LBS

## 2021-03-10 DIAGNOSIS — K51.011 ULCERATIVE PANCOLITIS WITH RECTAL BLEEDING (HCC): ICD-10-CM

## 2021-03-10 DIAGNOSIS — K51.011 ULCERATIVE PANCOLITIS WITH RECTAL BLEEDING (HCC): Primary | ICD-10-CM

## 2021-03-10 PROBLEM — K57.92 ACUTE DIVERTICULITIS: Status: RESOLVED | Noted: 2020-11-16 | Resolved: 2021-03-10

## 2021-03-10 PROBLEM — K51.00 ULCERATIVE PANCOLITIS: Status: ACTIVE | Noted: 2021-03-10

## 2021-03-10 PROBLEM — K52.9 COLITIS: Status: RESOLVED | Noted: 2020-11-15 | Resolved: 2021-03-10

## 2021-03-10 PROBLEM — R19.7 BLOODY DIARRHEA: Status: RESOLVED | Noted: 2020-11-11 | Resolved: 2021-03-10

## 2021-03-10 LAB
25(OH)D3 SERPL-MCNC: 46.1 NG/ML (ref 30–100)
ALBUMIN SERPL-MCNC: 4.2 G/DL (ref 3.5–5.2)
ALBUMIN/GLOB SERPL: 1.1 G/DL
ALP SERPL-CCNC: 103 U/L (ref 39–117)
ALT SERPL W P-5'-P-CCNC: 16 U/L (ref 1–33)
ANION GAP SERPL CALCULATED.3IONS-SCNC: 11.7 MMOL/L (ref 5–15)
AST SERPL-CCNC: 19 U/L (ref 1–32)
BASOPHILS # BLD AUTO: 0.08 10*3/MM3 (ref 0–0.2)
BASOPHILS NFR BLD AUTO: 0.8 % (ref 0–1.5)
BILIRUB SERPL-MCNC: <0.2 MG/DL (ref 0–1.2)
BUN SERPL-MCNC: 8 MG/DL (ref 6–20)
BUN/CREAT SERPL: 10.8 (ref 7–25)
CALCIUM SPEC-SCNC: 9.3 MG/DL (ref 8.6–10.5)
CHLORIDE SERPL-SCNC: 104 MMOL/L (ref 98–107)
CO2 SERPL-SCNC: 26.3 MMOL/L (ref 22–29)
CREAT SERPL-MCNC: 0.74 MG/DL (ref 0.57–1)
CRP SERPL-MCNC: 1.17 MG/DL (ref 0–0.5)
DEPRECATED RDW RBC AUTO: 48.8 FL (ref 37–54)
EOSINOPHIL # BLD AUTO: 0.14 10*3/MM3 (ref 0–0.4)
EOSINOPHIL NFR BLD AUTO: 1.5 % (ref 0.3–6.2)
ERYTHROCYTE [DISTWIDTH] IN BLOOD BY AUTOMATED COUNT: 16.3 % (ref 12.3–15.4)
GFR SERPL CREATININE-BSD FRML MDRD: 86 ML/MIN/1.73
GLOBULIN UR ELPH-MCNC: 3.7 GM/DL
GLUCOSE SERPL-MCNC: 79 MG/DL (ref 65–99)
HCT VFR BLD AUTO: 38.8 % (ref 34–46.6)
HGB BLD-MCNC: 12 G/DL (ref 12–15.9)
IMM GRANULOCYTES # BLD AUTO: 0.03 10*3/MM3 (ref 0–0.05)
IMM GRANULOCYTES NFR BLD AUTO: 0.3 % (ref 0–0.5)
IRON 24H UR-MRATE: 22 MCG/DL (ref 37–145)
IRON SATN MFR SERPL: 6 % (ref 20–50)
LYMPHOCYTES # BLD AUTO: 2.25 10*3/MM3 (ref 0.7–3.1)
LYMPHOCYTES NFR BLD AUTO: 23.4 % (ref 19.6–45.3)
MCH RBC QN AUTO: 25.3 PG (ref 26.6–33)
MCHC RBC AUTO-ENTMCNC: 30.9 G/DL (ref 31.5–35.7)
MCV RBC AUTO: 81.9 FL (ref 79–97)
MONOCYTES # BLD AUTO: 0.74 10*3/MM3 (ref 0.1–0.9)
MONOCYTES NFR BLD AUTO: 7.7 % (ref 5–12)
NEUTROPHILS NFR BLD AUTO: 6.38 10*3/MM3 (ref 1.7–7)
NEUTROPHILS NFR BLD AUTO: 66.3 % (ref 42.7–76)
NRBC BLD AUTO-RTO: 0 /100 WBC (ref 0–0.2)
PLATELET # BLD AUTO: 430 10*3/MM3 (ref 140–450)
PMV BLD AUTO: 11.9 FL (ref 6–12)
POTASSIUM SERPL-SCNC: 3.7 MMOL/L (ref 3.5–5.2)
PROT SERPL-MCNC: 7.9 G/DL (ref 6–8.5)
RBC # BLD AUTO: 4.74 10*6/MM3 (ref 3.77–5.28)
SODIUM SERPL-SCNC: 142 MMOL/L (ref 136–145)
TIBC SERPL-MCNC: 368 MCG/DL (ref 298–536)
TRANSFERRIN SERPL-MCNC: 247 MG/DL (ref 200–360)
VIT B12 BLD-MCNC: 795 PG/ML (ref 211–946)
WBC # BLD AUTO: 9.62 10*3/MM3 (ref 3.4–10.8)

## 2021-03-10 PROCEDURE — 83993 ASSAY FOR CALPROTECTIN FECAL: CPT

## 2021-03-10 PROCEDURE — 99214 OFFICE O/P EST MOD 30 MIN: CPT | Performed by: PHYSICIAN ASSISTANT

## 2021-03-10 PROCEDURE — 82607 VITAMIN B-12: CPT

## 2021-03-10 PROCEDURE — 85025 COMPLETE CBC W/AUTO DIFF WBC: CPT

## 2021-03-10 PROCEDURE — 86140 C-REACTIVE PROTEIN: CPT

## 2021-03-10 PROCEDURE — 80053 COMPREHEN METABOLIC PANEL: CPT

## 2021-03-10 PROCEDURE — 36415 COLL VENOUS BLD VENIPUNCTURE: CPT

## 2021-03-10 PROCEDURE — 82306 VITAMIN D 25 HYDROXY: CPT

## 2021-03-10 PROCEDURE — 84466 ASSAY OF TRANSFERRIN: CPT

## 2021-03-10 PROCEDURE — 83540 ASSAY OF IRON: CPT

## 2021-03-10 RX ORDER — MESALAMINE 0.38 G/1
1.5 CAPSULE, EXTENDED RELEASE ORAL DAILY
Qty: 120 CAPSULE | Refills: 5 | Status: SHIPPED | OUTPATIENT
Start: 2021-03-10 | End: 2021-11-18 | Stop reason: SDUPTHER

## 2021-03-10 RX ORDER — FLUTICASONE PROPIONATE 50 MCG
SPRAY, SUSPENSION (ML) NASAL DAILY
COMMUNITY
End: 2021-06-09

## 2021-03-10 NOTE — PROGRESS NOTES
Follow Up Note     Date: 03/10/2021   Patient Name: Stacy Guardado  MRN: [id number]  : 1975     Primary Care Provider: Shayna Swartz PA     Chief Complaint:    Chief Complaint   Patient presents with   • Follow-up   • Ulcerative Colitis     Pancolitis     History of present illness:   3/10/2021  Stacy Guardado is a 45 y.o. female who is here today for follow up regarding ulcerative colitis.     Since her last visit, she reports an overall improvement in her illness. She feels better but still has 5 stools per day which are described as small soft pieces of stool or sometimes loose in consistency. She has intermittent bright red rectal bleeding with 1-2 stools intermittently. Blood is described as mixed in with stool but not large in amount. This occurs about 1 time per week over the past 1.5 months. She is only taking Apriso 4 caps in the morning daily, never started azathioprine as directed because she was confused as to why she should take it and worried about the side effects. Complains of current fatigue and cold intolerance. Is not currently taking iron supplements but has a history of TOMI. She has rare left sided flank pain which is mild to moderate when it occurs. Her new diagnosis of ulcerative colitis was made after colonoscopy was completed in 2020.     Interval History:  2020  Stacy Guardado is a 45 y.o. female who is here today for follow up for IBD diarrhea. Her diarrhea improved. 4-5 times small soft stool without blood and 3-4 times soft small stool at night.  She will get occasional cramps diffuse.  She was recently discharged from the hospital after being treated for recurrent diarrhea blood in the stool.  She is currently taking prednisone tapering dose  She had a colonoscopy and she is here to discuss the pathology report and further management..      11/10/2020  Stacy Guardado is a 45 y.o. female who is here today to establish care with  Gastroenterology for evaluation of recent diarrhea with blood.   She states that she noted to have red blood in stool three weeks ago and that got worse with loose stool.She also develeloped LLQ abdominal pain. Loose stool 5-7 times daily. Deny any fever chills. Patient was in emergency room 2 weeks ago with complaints of diarrhea abdominal pain and blood in the stool.  She had basic lab work done which revealed borderline leukocytosis.  She deny any prior constipation. She also had a CT scan of the abdomen pelvis done which revealed thickening in the descending colon the sigmoid suggesting colitis. She was given Augmentin for ten days.    Weight is stable. Pt denies nausea vomiting or odynophagia or dysphagia. There is no history of acid reflux. There is no history of anemia. No prior history of EGD or colonoscopy. No family history of colon cancer or any GI malignancy, or IBD. No history of any abdominal surgery. Denies alcohol abuse or cigarette smoking.     Subjective     Past Medical History:   Diagnosis Date   • Abnormal ECG    • Bipolar affective (CMS/HCC)    • COPD (chronic obstructive pulmonary disease) (CMS/HCC)    • Current every day smoker     Patient states former smoker   • Palpitations    • Snores     very little   • Tattoos 11/12/2020    x's 3   • Tattoos      Past Surgical History:   Procedure Laterality Date   • COLONOSCOPY N/A 11/17/2020    Procedure: COLONOSCOPY WITH BIOPSIES;  Surgeon: Susan Brizuela MD;  Location: [facility] ENDOSCOPY;  Service: Gastroenterology;  Laterality: N/A;   • WISDOM TOOTH EXTRACTION       Family History   Problem Relation Age of Onset   • Hyperlipidemia Mother    • Hyperlipidemia Father    • Hypertension Father    • Stroke Brother    • Heart attack Maternal Grandfather    • Heart disease Maternal Grandfather    • Heart disease Paternal Grandfather    • Heart attack Paternal Grandfather    • Hyperlipidemia Brother    • Alcohol abuse Other    • Diabetes Other    •  Hypertension Other    • Stroke Other    • Colon cancer Neg Hx    • Cirrhosis Neg Hx    • Liver cancer Neg Hx    • Liver disease Neg Hx      Social History     Socioeconomic History   • Marital status:      Spouse name: Not on file   • Number of children: Not on file   • Years of education: Not on file   • Highest education level: Not on file   Tobacco Use   • Smoking status: Former Smoker     Packs/day: 1.00     Types: Cigarettes     Quit date: 2019     Years since quittin.8   • Smokeless tobacco: Never Used   Vaping Use   • Vaping Use: Former   • Quit date: 3/18/2020   • Substances: Flavoring   • Devices: Pre-filled or refillable cartridge, Refillable tank   Substance and Sexual Activity   • Alcohol use: Yes     Comment: socially   • Drug use: No   • Sexual activity: Defer       Current Outpatient Medications:   •  fluticasone (FLONASE) 50 MCG/ACT nasal spray, into the nostril(s) as directed by provider Daily., Disp: , Rfl:   •  mesalamine (APRISO) 0.375 g 24 hr capsule, Take 4 cap PO once daily, Disp: 120 capsule, Rfl: 5    Allergies   Allergen Reactions   • Augmentin [Amoxicillin-Pot Clavulanate] Diarrhea and GI Intolerance       The following portions of the patient's history were reviewed and updated as appropriate: allergies, current medications, past family history, past medical history, past social history, past surgical history and problem list.    Objective     Physical Exam  Vitals reviewed.   Constitutional:       General: She is not in acute distress.     Appearance: Normal appearance. She is well-developed. She is not ill-appearing or diaphoretic.   HENT:      Head: Normocephalic and atraumatic.      Right Ear: External ear normal.      Left Ear: External ear normal.      Nose: Nose normal.      Mouth/Throat:      Comments: Wearing a mask  Eyes:      General: No scleral icterus.        Right eye: No discharge.         Left eye: No discharge.      Conjunctiva/sclera: Conjunctivae normal.  "  Neck:      Vascular: No JVD.   Cardiovascular:      Rate and Rhythm: Normal rate and regular rhythm.      Heart sounds: Normal heart sounds. No murmur. No friction rub. No gallop.    Pulmonary:      Effort: Pulmonary effort is normal. No respiratory distress.      Breath sounds: Normal breath sounds. No wheezing or rales.   Chest:      Chest wall: No tenderness.   Abdominal:      General: Bowel sounds are normal. There is no distension.      Palpations: Abdomen is soft. There is no mass.      Tenderness: There is no abdominal tenderness. There is no guarding.   Musculoskeletal:         General: No deformity. Normal range of motion.      Cervical back: Normal range of motion.   Skin:     General: Skin is warm and dry.      Findings: No erythema or rash.   Neurological:      Mental Status: She is alert and oriented to person, place, and time.      Coordination: Coordination normal.   Psychiatric:         Mood and Affect: Mood normal.         Behavior: Behavior normal.         Thought Content: Thought content normal.         Judgment: Judgment normal.       Vitals:    03/10/21 1400   BP: 140/73   Pulse: 111   Resp: 16   Temp: 98.4 °F (36.9 °C)   Weight: 67.1 kg (148 lb)   Height: 162.6 cm (64\")       Results Review:   I reviewed the patient's new clinical results.    Admission on 12/15/2020, Discharged on 12/15/2020   Component Date Value Ref Range Status   • Glucose 12/15/2020 134* 65 - 99 mg/dL Final   • BUN 12/15/2020 15  6 - 20 mg/dL Final   • Creatinine 12/15/2020 0.86  0.57 - 1.00 mg/dL Final   • Sodium 12/15/2020 131* 136 - 145 mmol/L Final   • Potassium 12/15/2020 4.5  3.5 - 5.2 mmol/L Final   • Chloride 12/15/2020 94* 98 - 107 mmol/L Final   • CO2 12/15/2020 23.2  22.0 - 29.0 mmol/L Final   • Calcium 12/15/2020 9.1  8.6 - 10.5 mg/dL Final   • Total Protein 12/15/2020 7.0  6.0 - 8.5 g/dL Final   • Albumin 12/15/2020 3.30* 3.50 - 5.20 g/dL Final   • ALT (SGPT) 12/15/2020 14  1 - 33 U/L Final   • AST (SGOT) " 12/15/2020 9  1 - 32 U/L Final   • Alkaline Phosphatase 12/15/2020 95  39 - 117 U/L Final   • Total Bilirubin 12/15/2020 0.5  0.0 - 1.2 mg/dL Final   • eGFR Non African Amer 12/15/2020 72  >60 mL/min/1.73 Final   • Globulin 12/15/2020 3.7  gm/dL Final   • A/G Ratio 12/15/2020 0.9  g/dL Final   • BUN/Creatinine Ratio 12/15/2020 17.4  7.0 - 25.0 Final   • Anion Gap 12/15/2020 13.8  5.0 - 15.0 mmol/L Final   • Lipase 12/15/2020 13  13 - 60 U/L Final   • Troponin T 12/15/2020 <0.010  0.000 - 0.030 ng/mL Final   • WBC 12/15/2020 14.52* 3.40 - 10.80 10*3/mm3 Final   • RBC 12/15/2020 4.02  3.77 - 5.28 10*6/mm3 Final   • Hemoglobin 12/15/2020 11.0* 12.0 - 15.9 g/dL Final   • Hematocrit 12/15/2020 35.0  34.0 - 46.6 % Final   • MCV 12/15/2020 87.1  79.0 - 97.0 fL Final   • MCH 12/15/2020 27.4  26.6 - 33.0 pg Final   • MCHC 12/15/2020 31.4* 31.5 - 35.7 g/dL Final   • RDW 12/15/2020 16.4* 12.3 - 15.4 % Final   • RDW-SD 12/15/2020 52.5  37.0 - 54.0 fl Final   • MPV 12/15/2020 8.8  6.0 - 12.0 fL Final   • Platelets 12/15/2020 606* 140 - 450 10*3/mm3 Final   • Scan Slide 12/15/2020    Final    See Manual Differential Results   • Neutrophil % 12/15/2020 49.0  42.7 - 76.0 % Final   • Lymphocyte % 12/15/2020 6.0* 19.6 - 45.3 % Final   • Monocyte % 12/15/2020 8.0  5.0 - 12.0 % Final   • Bands %  12/15/2020 30.0* 0.0 - 5.0 % Final   • Metamyelocyte % 12/15/2020 6.0* 0.0 - 0.0 % Final   • Myelocyte % 12/15/2020 1.0* 0.0 - 0.0 % Final   • Neutrophils Absolute 12/15/2020 11.47* 1.70 - 7.00 10*3/mm3 Final   • Lymphocytes Absolute 12/15/2020 0.87  0.70 - 3.10 10*3/mm3 Final   • Monocytes Absolute 12/15/2020 1.16* 0.10 - 0.90 10*3/mm3 Final   • RBC Morphology 12/15/2020 Normal  Normal Final   • Toxic Granulation 12/15/2020 Slight/1+  None Seen Final   • Vacuolated Neutrophils 12/15/2020 Slight/1+  None Seen Final   • Platelet Estimate 12/15/2020 Increased  Normal Final      CT Abdomen Pelvis With Contrast    Result Date: 12/15/2020  Acute  colitis, most severe involving the ascending colon.  Mild free fluid.  Recommend appropriate close follow-up Authenticated by Rogerio Webster MD on 12/15/2020 05:01:04 PM     Colonoscopy was completed on 11/17/2020 by Dr. Brizuela. Findings were:  - Hemorrhoids found on perianal exam.  - Pancolitis ulcerative colitis. Inflammation was found from the rectum to the hepatic flexure. This was severe,  new compared to previous examinations. Biopsied. Dispersed ulceration and colitis identified proximal to HF upt  to ceacum  - Separate biopsy was sent to rule out CMV ulceration.  - Non-bleeding external hemorrhoids.  - The examined portion of the ileum was normal. Biopsied.  Pathology showed ileal mucosa with no significant histopathologic abnormality of the terminal ileum, cecal biopsy showed mildly active colitis without dysplasia, ascending colon polyps he showed mildly active colitis, transverse colon biopsy showed moderately active colitis, transverse colon biopsies showed severely active colitis with ulceration, descending colon biopsy showed moderately active colitis with ulceration, avoid colon biopsy showed moderately active colitis, rectal biopsy showed mildly active colitis with ulceration.    Assessment / Plan      1. Ulcerative pancolitis with rectal bleeding  3/10/2021  She is feeling better with minimal abdominal pain but still having loose stools and intermittent rectal bleeding. She is taking mesalamine (Apriso) but did not start previously recommended azathioprine (Imuran). For now, I have asked her to complete basic labs as well as iron profile, CRP, B12, vit D and calprotectin stool. Results will be reviewed and depending on those results, may hold off on starting Imuran due to patient preference.   She has already had screening labs needed before starting immunosuppressive therapy.   Reminded her that it is recommended that she get covid vaccination.   - CBC Auto Differential; Future  -  Comprehensive Metabolic Panel; Future  - Iron Profile; Future  - Calprotectin, Fecal - Stool, Per Rectum; Future  - C-reactive Protein; Future  - Vitamin B12; Future  - Vitamin D 1,25 Dihydroxy; Future  - mesalamine (APRISO) 0.375 g 24 hr capsule; Take 4 cap PO once daily  Dispense: 120 capsule; Refill: 5    12/9/2020   she had a colonoscopy done on 11/17/2020 which revealed extensive diffuse colitis involving the colon up to the hepatic flexure from the rectum.  Dispersed inflammation noted from hyperflexion to the cecum.  Anaspaz normal.  Examined terminal ileum for about 15 cm from IC valve was normal.   Pathology revealed acute severe colitis with crypt abscess.  Patient had chronic diarrhea more than 2 months. These findings are more in favor of ulcerative colitis.   Recent TB Gold test was negative.  Chronic hepatitis panel was negative for any chronic hep C or hep B infection.   She is currently on a tapering dose of steroids  She would definitely benefit from Biologics however at this time patient's insurance does not cover Remicade or the Humira or newer Biologics.   We will start her on Apriso along with the Imuran and see how she does with that regimen.   If no significant improvement with that for the next 6 months to 12 months we will consider Biologics and get a prior authorization  Started on Apriso 0.375 g p.o. 4 capsules daily  We will add Imuran 100 mg p.o. daily when once a TPMT genetics available  She is immune to hepatitis A but not to hepatitis B.  She needs a hep B vaccine as outpatient with the booster  Advised flu vaccine and COVID-19 vaccine     11/10/2020  Acute episode of bloody diarrhea, which has progressed now to chronic.  Symptoms more suggestive infective colitis however IBD cannot be ruled out.   She states that she finished 10-day course of Augmentin given at the ED but she still has ongoing bloody diarrhea with minimal left lower quadrant abdominal pain.   Her basic lab work done  in the emergency room 2 weeks ago did not reveal any significant abnormality except borderline leukocytosis.  CT of the abdomen pelvis done revealed a thickened descending colon and the sigmoid colon suggesting colitis.  No family history of any IBD.   Given her persistent symptoms we will schedule her for colonoscopy for further evaluation  We will also get a stool for C. difficile and the stool for GI panel         Prior history:  2.  Iron deficiency anemia  She had a significant bloody diarrhea causing severe anemia  Patient is currently on iron pills.  We will get a CBC today  We will continue the iron pills          Follow Up:   Return in about 3 months (around 6/10/2021) for recheck UC.      Leslie Dalton PA-C  [facility] [place]  3/10/2021  14:41 EST    Please note that portions of this note may have been completed with a voice recognition program. Efforts were made to edit the dictations, but occasionally words are mistranscribed.

## 2021-03-11 DIAGNOSIS — E61.1 IRON DEFICIENCY: Primary | ICD-10-CM

## 2021-03-15 LAB — CALPROTECTIN STL-MCNT: 384 UG/G (ref 0–120)

## 2021-03-16 ENCOUNTER — TELEPHONE (OUTPATIENT)
Dept: GASTROENTEROLOGY | Facility: CLINIC | Age: 46
End: 2021-03-16

## 2021-03-16 DIAGNOSIS — E61.1 IRON DEFICIENCY: ICD-10-CM

## 2021-03-16 DIAGNOSIS — Z51.81 MEDICATION MONITORING ENCOUNTER: ICD-10-CM

## 2021-03-16 DIAGNOSIS — K51.00 ULCERATIVE PANCOLITIS (HCC): Primary | ICD-10-CM

## 2021-03-16 RX ORDER — FERROUS SULFATE 325(65) MG
325 TABLET ORAL
Qty: 30 TABLET | Refills: 3 | Status: SHIPPED | OUTPATIENT
Start: 2021-03-16 | End: 2021-07-13 | Stop reason: SDUPTHER

## 2021-03-16 RX ORDER — AZATHIOPRINE 50 MG/1
100 TABLET ORAL DAILY
Qty: 60 TABLET | Refills: 5 | Status: SHIPPED | OUTPATIENT
Start: 2021-03-16 | End: 2021-08-16

## 2021-04-12 ENCOUNTER — TELEPHONE (OUTPATIENT)
Dept: GASTROENTEROLOGY | Facility: CLINIC | Age: 46
End: 2021-04-12

## 2021-04-12 NOTE — TELEPHONE ENCOUNTER
Patient called stating that the pharmacy wouldn't give her Mesalamine and she didn't know why. Called [facility] they state that it requires prior authorization. PA request has been submitted.

## 2021-04-16 ENCOUNTER — LAB (OUTPATIENT)
Dept: LAB | Facility: HOSPITAL | Age: 46
End: 2021-04-16

## 2021-04-16 DIAGNOSIS — E61.1 IRON DEFICIENCY: ICD-10-CM

## 2021-04-16 DIAGNOSIS — K51.00 ULCERATIVE PANCOLITIS (HCC): ICD-10-CM

## 2021-04-16 DIAGNOSIS — Z51.81 MEDICATION MONITORING ENCOUNTER: ICD-10-CM

## 2021-04-16 LAB
ALBUMIN SERPL-MCNC: 4.1 G/DL (ref 3.5–5.2)
ALBUMIN/GLOB SERPL: 1.5 G/DL
ALP SERPL-CCNC: 92 U/L (ref 39–117)
ALT SERPL W P-5'-P-CCNC: 10 U/L (ref 1–33)
ANION GAP SERPL CALCULATED.3IONS-SCNC: 9.5 MMOL/L (ref 5–15)
AST SERPL-CCNC: 15 U/L (ref 1–32)
BILIRUB SERPL-MCNC: <0.2 MG/DL (ref 0–1.2)
BUN SERPL-MCNC: 10 MG/DL (ref 6–20)
BUN/CREAT SERPL: 13.7 (ref 7–25)
CALCIUM SPEC-SCNC: 9.1 MG/DL (ref 8.6–10.5)
CHLORIDE SERPL-SCNC: 103 MMOL/L (ref 98–107)
CO2 SERPL-SCNC: 25.5 MMOL/L (ref 22–29)
CREAT SERPL-MCNC: 0.73 MG/DL (ref 0.57–1)
DEPRECATED RDW RBC AUTO: 49.3 FL (ref 37–54)
ERYTHROCYTE [DISTWIDTH] IN BLOOD BY AUTOMATED COUNT: 17.2 % (ref 12.3–15.4)
FERRITIN SERPL-MCNC: 79.29 NG/ML (ref 13–150)
GFR SERPL CREATININE-BSD FRML MDRD: 87 ML/MIN/1.73
GLOBULIN UR ELPH-MCNC: 2.8 GM/DL
GLUCOSE SERPL-MCNC: 87 MG/DL (ref 65–99)
HCT VFR BLD AUTO: 34.7 % (ref 34–46.6)
HGB BLD-MCNC: 11.2 G/DL (ref 12–15.9)
MCH RBC QN AUTO: 26.1 PG (ref 26.6–33)
MCHC RBC AUTO-ENTMCNC: 32.3 G/DL (ref 31.5–35.7)
MCV RBC AUTO: 80.9 FL (ref 79–97)
PLATELET # BLD AUTO: 371 10*3/MM3 (ref 140–450)
PMV BLD AUTO: 11.6 FL (ref 6–12)
POTASSIUM SERPL-SCNC: 4.2 MMOL/L (ref 3.5–5.2)
PROT SERPL-MCNC: 6.9 G/DL (ref 6–8.5)
RBC # BLD AUTO: 4.29 10*6/MM3 (ref 3.77–5.28)
SODIUM SERPL-SCNC: 138 MMOL/L (ref 136–145)
WBC # BLD AUTO: 8.35 10*3/MM3 (ref 3.4–10.8)

## 2021-04-16 PROCEDURE — 36415 COLL VENOUS BLD VENIPUNCTURE: CPT

## 2021-04-16 PROCEDURE — 82728 ASSAY OF FERRITIN: CPT

## 2021-04-16 PROCEDURE — 80053 COMPREHEN METABOLIC PANEL: CPT

## 2021-04-16 PROCEDURE — 85027 COMPLETE CBC AUTOMATED: CPT

## 2021-06-09 ENCOUNTER — OFFICE VISIT (OUTPATIENT)
Dept: GASTROENTEROLOGY | Facility: CLINIC | Age: 46
End: 2021-06-09

## 2021-06-09 ENCOUNTER — LAB (OUTPATIENT)
Dept: LAB | Facility: HOSPITAL | Age: 46
End: 2021-06-09

## 2021-06-09 ENCOUNTER — TELEPHONE (OUTPATIENT)
Dept: GASTROENTEROLOGY | Facility: CLINIC | Age: 46
End: 2021-06-09

## 2021-06-09 VITALS
BODY MASS INDEX: 24.07 KG/M2 | DIASTOLIC BLOOD PRESSURE: 85 MMHG | WEIGHT: 141 LBS | RESPIRATION RATE: 16 BRPM | TEMPERATURE: 97.7 F | HEIGHT: 64 IN | SYSTOLIC BLOOD PRESSURE: 126 MMHG | HEART RATE: 89 BPM

## 2021-06-09 DIAGNOSIS — K51.00 ULCERATIVE PANCOLITIS (HCC): ICD-10-CM

## 2021-06-09 DIAGNOSIS — D50.0 IRON DEFICIENCY ANEMIA DUE TO CHRONIC BLOOD LOSS: ICD-10-CM

## 2021-06-09 DIAGNOSIS — D50.9 IRON DEFICIENCY ANEMIA, UNSPECIFIED IRON DEFICIENCY ANEMIA TYPE: ICD-10-CM

## 2021-06-09 DIAGNOSIS — K51.00 ULCERATIVE PANCOLITIS (HCC): Primary | ICD-10-CM

## 2021-06-09 LAB
ADV 40+41 DNA STL QL NAA+NON-PROBE: NOT DETECTED
ALBUMIN SERPL-MCNC: 4.3 G/DL (ref 3.5–5.2)
ALBUMIN/GLOB SERPL: 1.2 G/DL
ALP SERPL-CCNC: 102 U/L (ref 39–117)
ALT SERPL W P-5'-P-CCNC: 12 U/L (ref 1–33)
ANION GAP SERPL CALCULATED.3IONS-SCNC: 12.6 MMOL/L (ref 5–15)
AST SERPL-CCNC: 18 U/L (ref 1–32)
ASTRO TYP 1-8 RNA STL QL NAA+NON-PROBE: NOT DETECTED
BASOPHILS # BLD AUTO: 0.06 10*3/MM3 (ref 0–0.2)
BASOPHILS NFR BLD AUTO: 0.5 % (ref 0–1.5)
BILIRUB SERPL-MCNC: 0.2 MG/DL (ref 0–1.2)
BUN SERPL-MCNC: 6 MG/DL (ref 6–20)
BUN/CREAT SERPL: 9.7 (ref 7–25)
C CAYETANENSIS DNA STL QL NAA+NON-PROBE: NOT DETECTED
C COLI+JEJ+UPSA DNA STL QL NAA+NON-PROBE: NOT DETECTED
C DIFF TOX GENS STL QL NAA+PROBE: DETECTED
CALCIUM SPEC-SCNC: 9.8 MG/DL (ref 8.6–10.5)
CHLORIDE SERPL-SCNC: 102 MMOL/L (ref 98–107)
CO2 SERPL-SCNC: 26.4 MMOL/L (ref 22–29)
CREAT SERPL-MCNC: 0.62 MG/DL (ref 0.57–1)
CRP SERPL-MCNC: 2.55 MG/DL (ref 0–0.5)
CRYPTOSP DNA STL QL NAA+NON-PROBE: NOT DETECTED
DEPRECATED RDW RBC AUTO: 59.3 FL (ref 37–54)
E HISTOLYT DNA STL QL NAA+NON-PROBE: NOT DETECTED
EAEC PAA PLAS AGGR+AATA ST NAA+NON-PRB: NOT DETECTED
EC STX1+STX2 GENES STL QL NAA+NON-PROBE: NOT DETECTED
EOSINOPHIL # BLD AUTO: 0.52 10*3/MM3 (ref 0–0.4)
EOSINOPHIL NFR BLD AUTO: 4.7 % (ref 0.3–6.2)
EPEC EAE GENE STL QL NAA+NON-PROBE: NOT DETECTED
ERYTHROCYTE [DISTWIDTH] IN BLOOD BY AUTOMATED COUNT: 18.2 % (ref 12.3–15.4)
ETEC LTA+ST1A+ST1B TOX ST NAA+NON-PROBE: NOT DETECTED
G LAMBLIA DNA STL QL NAA+NON-PROBE: NOT DETECTED
GFR SERPL CREATININE-BSD FRML MDRD: 105 ML/MIN/1.73
GLOBULIN UR ELPH-MCNC: 3.6 GM/DL
GLUCOSE SERPL-MCNC: 75 MG/DL (ref 65–99)
HCT VFR BLD AUTO: 37.5 % (ref 34–46.6)
HGB BLD-MCNC: 11.7 G/DL (ref 12–15.9)
IMM GRANULOCYTES # BLD AUTO: 0.05 10*3/MM3 (ref 0–0.05)
IMM GRANULOCYTES NFR BLD AUTO: 0.5 % (ref 0–0.5)
IRON 24H UR-MRATE: 43 MCG/DL (ref 37–145)
IRON SATN MFR SERPL: 12 % (ref 20–50)
LYMPHOCYTES # BLD AUTO: 1.61 10*3/MM3 (ref 0.7–3.1)
LYMPHOCYTES NFR BLD AUTO: 14.5 % (ref 19.6–45.3)
MCH RBC QN AUTO: 27.8 PG (ref 26.6–33)
MCHC RBC AUTO-ENTMCNC: 31.2 G/DL (ref 31.5–35.7)
MCV RBC AUTO: 89.1 FL (ref 79–97)
MONOCYTES # BLD AUTO: 0.84 10*3/MM3 (ref 0.1–0.9)
MONOCYTES NFR BLD AUTO: 7.6 % (ref 5–12)
NEUTROPHILS NFR BLD AUTO: 72.2 % (ref 42.7–76)
NEUTROPHILS NFR BLD AUTO: 8.03 10*3/MM3 (ref 1.7–7)
NOROVIRUS GI+II RNA STL QL NAA+NON-PROBE: NOT DETECTED
NRBC BLD AUTO-RTO: 0 /100 WBC (ref 0–0.2)
P SHIGELLOIDES DNA STL QL NAA+NON-PROBE: NOT DETECTED
PLATELET # BLD AUTO: 492 10*3/MM3 (ref 140–450)
PMV BLD AUTO: 11.2 FL (ref 6–12)
POTASSIUM SERPL-SCNC: 4.2 MMOL/L (ref 3.5–5.2)
PROT SERPL-MCNC: 7.9 G/DL (ref 6–8.5)
RBC # BLD AUTO: 4.21 10*6/MM3 (ref 3.77–5.28)
RVA RNA STL QL NAA+NON-PROBE: NOT DETECTED
S ENT+BONG DNA STL QL NAA+NON-PROBE: NOT DETECTED
SAPO I+II+IV+V RNA STL QL NAA+NON-PROBE: NOT DETECTED
SHIGELLA SP+EIEC IPAH ST NAA+NON-PROBE: NOT DETECTED
SODIUM SERPL-SCNC: 141 MMOL/L (ref 136–145)
TIBC SERPL-MCNC: 347 MCG/DL (ref 298–536)
TRANSFERRIN SERPL-MCNC: 233 MG/DL (ref 200–360)
V CHOL+PARA+VUL DNA STL QL NAA+NON-PROBE: NOT DETECTED
V CHOLERAE DNA STL QL NAA+NON-PROBE: NOT DETECTED
WBC # BLD AUTO: 11.11 10*3/MM3 (ref 3.4–10.8)
Y ENTEROCOL DNA STL QL NAA+NON-PROBE: NOT DETECTED

## 2021-06-09 PROCEDURE — 99214 OFFICE O/P EST MOD 30 MIN: CPT | Performed by: PHYSICIAN ASSISTANT

## 2021-06-09 PROCEDURE — 83540 ASSAY OF IRON: CPT

## 2021-06-09 PROCEDURE — 84466 ASSAY OF TRANSFERRIN: CPT

## 2021-06-09 PROCEDURE — 83993 ASSAY FOR CALPROTECTIN FECAL: CPT

## 2021-06-09 PROCEDURE — 87493 C DIFF AMPLIFIED PROBE: CPT

## 2021-06-09 PROCEDURE — 85025 COMPLETE CBC W/AUTO DIFF WBC: CPT

## 2021-06-09 PROCEDURE — 36415 COLL VENOUS BLD VENIPUNCTURE: CPT

## 2021-06-09 PROCEDURE — 86140 C-REACTIVE PROTEIN: CPT

## 2021-06-09 PROCEDURE — 80053 COMPREHEN METABOLIC PANEL: CPT

## 2021-06-09 PROCEDURE — 0097U HC BIOFIRE FILMARRAY GI PANEL: CPT

## 2021-06-09 RX ORDER — VANCOMYCIN HYDROCHLORIDE 125 MG/1
125 CAPSULE ORAL 4 TIMES DAILY
Qty: 40 CAPSULE | Refills: 0 | Status: SHIPPED | OUTPATIENT
Start: 2021-06-09 | End: 2021-06-19

## 2021-06-09 RX ORDER — CETIRIZINE HYDROCHLORIDE 10 MG/1
10 TABLET ORAL DAILY
COMMUNITY

## 2021-06-09 NOTE — PROGRESS NOTES
Follow Up Note     Date: 2021   Patient Name: Stacy Guardado  MRN: [id number]  : 1975     Primary Care Provider: Shayna Swartz PA     Chief Complaint:    Chief Complaint   Patient presents with   • Ulcerative Colitis     History of present illness:   2021  Stacy Guardado is a 45 y.o. female who is here today for follow up regarding ulcerative colitis.    She complains that she is still having diarrhea, rectal bleeding abdominal pain. She has 7 days stools daily and this has been her normal over the past 3 months. Rectal bleeding is present with the stool, stools are described as soft and formed. She does not have hard stools. Stools are not watery. Abdominal pain is located in the left side of her abdomen, mostly on the left lower quadrant. Pain is now relieved with a bowel movement. She had 1 large abnormal stool that had appearance of a white string in it. She is uncertain if this was mucus. Since her last visit, she has been taking azathioprine 50 mg 2 tablets once daily plus Apriso 4 capsules in the morning for treatment of her ulcerative colitis. Still taking iron supplement as directed. She had labs last visit and would like to discuss those results.     Interval History:  3/10/2021  Since her last visit, she reports an overall improvement in her illness. She feels better but still has 5 stools per day which are described as small soft pieces of stool or sometimes loose in consistency. She has intermittent bright red rectal bleeding with 1-2 stools intermittently. Blood is described as mixed in with stool but not large in amount. This occurs about 1 time per week over the past 1.5 months. She is only taking Apriso 4 caps in the morning daily, never started azathioprine as directed because she was confused as to why she should take it and worried about the side effects. Complains of current fatigue and cold intolerance. Is not currently taking iron supplements but has a  history of TOMI. She has rare left sided flank pain which is mild to moderate when it occurs. Her new diagnosis of ulcerative colitis was made after colonoscopy was completed in 11/2020.      12/9/2020  Stacy Guardado is a 45 y.o. female who is here today for follow up for IBD diarrhea. Her diarrhea improved. 4-5 times small soft stool without blood and 3-4 times soft small stool at night.  She will get occasional cramps diffuse.  She was recently discharged from the hospital after being treated for recurrent diarrhea blood in the stool.  She is currently taking prednisone tapering dose  She had a colonoscopy and she is here to discuss the pathology report and further management..      11/10/2020  Stacy Guardado is a 45 y.o. female who is here today to establish care with Gastroenterology for evaluation of recent diarrhea with blood.   She states that she noted to have red blood in stool three weeks ago and that got worse with loose stool.She also develeloped LLQ abdominal pain. Loose stool 5-7 times daily. Deny any fever chills. Patient was in emergency room 2 weeks ago with complaints of diarrhea abdominal pain and blood in the stool.  She had basic lab work done which revealed borderline leukocytosis.  She deny any prior constipation. She also had a CT scan of the abdomen pelvis done which revealed thickening in the descending colon the sigmoid suggesting colitis. She was given Augmentin for ten days.    Weight is stable. Pt denies nausea vomiting or odynophagia or dysphagia. There is no history of acid reflux. There is no history of anemia. No prior history of EGD or colonoscopy. No family history of colon cancer or any GI malignancy, or IBD. No history of any abdominal surgery. Denies alcohol abuse or cigarette smoking.     Subjective     Past Medical History:   Diagnosis Date   • Abnormal ECG    • Bipolar affective (CMS/HCC)    • COPD (chronic obstructive pulmonary disease) (CMS/HCC)    • Current  every day smoker     Patient states former smoker   • Palpitations    • Snores     very little   • Tattoos 2020    x's 3   • Tattoos      Past Surgical History:   Procedure Laterality Date   • COLONOSCOPY N/A 2020    Procedure: COLONOSCOPY WITH BIOPSIES;  Surgeon: Susan Brizuela MD;  Location: [facility] ENDOSCOPY;  Service: Gastroenterology;  Laterality: N/A;   • WISDOM TOOTH EXTRACTION       Family History   Problem Relation Age of Onset   • Hyperlipidemia Mother    • Hyperlipidemia Father    • Hypertension Father    • Stroke Brother    • Heart attack Maternal Grandfather    • Heart disease Maternal Grandfather    • Heart disease Paternal Grandfather    • Heart attack Paternal Grandfather    • Hyperlipidemia Brother    • Alcohol abuse Other    • Diabetes Other    • Hypertension Other    • Stroke Other    • Colon cancer Neg Hx    • Cirrhosis Neg Hx    • Liver cancer Neg Hx    • Liver disease Neg Hx      Social History     Socioeconomic History   • Marital status:      Spouse name: Not on file   • Number of children: Not on file   • Years of education: Not on file   • Highest education level: Not on file   Tobacco Use   • Smoking status: Former Smoker     Packs/day: 1.00     Types: Cigarettes     Quit date: 2019     Years since quittin.1   • Smokeless tobacco: Never Used   Vaping Use   • Vaping Use: Former   • Quit date: 3/18/2020   • Substances: Flavoring   • Devices: Pre-filled or refillable cartridge, Refillable tank   Substance and Sexual Activity   • Alcohol use: Yes     Comment: socially   • Drug use: No   • Sexual activity: Defer       Current Outpatient Medications:   •  azaTHIOprine (IMURAN) 50 MG tablet, Take 2 tablets by mouth Daily., Disp: 60 tablet, Rfl: 5  •  cetirizine (zyrTEC) 10 MG tablet, Take 10 mg by mouth Daily., Disp: , Rfl:   •  ferrous sulfate 325 (65 FE) MG tablet, Take 1 tablet by mouth Daily With Breakfast., Disp: 30 tablet, Rfl: 3  •  mesalamine (APRISO)  0.375 g 24 hr capsule, Take 4 cap PO once daily, Disp: 120 capsule, Rfl: 5  •  fluticasone (FLONASE) 50 MCG/ACT nasal spray, into the nostril(s) as directed by provider Daily., Disp: , Rfl:     Allergies   Allergen Reactions   • Augmentin [Amoxicillin-Pot Clavulanate] Diarrhea and GI Intolerance     The following portions of the patient's history were reviewed and updated as appropriate: allergies, current medications, past family history, past medical history, past social history, past surgical history and problem list.    Objective     Physical Exam  Vitals reviewed.   Constitutional:       General: She is not in acute distress.     Appearance: Normal appearance. She is well-developed. She is not ill-appearing or diaphoretic.   HENT:      Head: Normocephalic and atraumatic.      Right Ear: External ear normal.      Left Ear: External ear normal.      Nose: Nose normal.      Mouth/Throat:      Comments: Wearing a mask  Eyes:      General: No scleral icterus.        Right eye: No discharge.         Left eye: No discharge.      Conjunctiva/sclera: Conjunctivae normal.   Neck:      Vascular: No JVD.   Cardiovascular:      Rate and Rhythm: Normal rate and regular rhythm.      Heart sounds: Normal heart sounds. No murmur heard.   No friction rub. No gallop.    Pulmonary:      Effort: Pulmonary effort is normal. No respiratory distress.      Breath sounds: Normal breath sounds. No wheezing or rales.   Chest:      Chest wall: No tenderness.   Abdominal:      General: Bowel sounds are normal. There is no distension.      Palpations: Abdomen is soft. There is no mass.      Tenderness: There is abdominal tenderness (epigastric, LUQ and LLQ (mild)). There is guarding (voluntary).   Musculoskeletal:         General: No deformity. Normal range of motion.      Cervical back: Normal range of motion.   Skin:     General: Skin is warm and dry.      Findings: No erythema or rash.   Neurological:      Mental Status: She is alert and  "oriented to person, place, and time.      Coordination: Coordination normal.   Psychiatric:         Mood and Affect: Mood normal.         Behavior: Behavior normal.         Thought Content: Thought content normal.         Judgment: Judgment normal.       Vitals:    06/09/21 1043   BP: 126/85   Pulse: 89   Resp: 16   Temp: 97.7 °F (36.5 °C)   Weight: 64 kg (141 lb)   Height: 162.6 cm (64\")       Results Review:   I reviewed the patient's new clinical results.    Lab on 04/16/2021   Component Date Value Ref Range Status   • Glucose 04/16/2021 87  65 - 99 mg/dL Final   • BUN 04/16/2021 10  6 - 20 mg/dL Final   • Creatinine 04/16/2021 0.73  0.57 - 1.00 mg/dL Final   • Sodium 04/16/2021 138  136 - 145 mmol/L Final   • Potassium 04/16/2021 4.2  3.5 - 5.2 mmol/L Final   • Chloride 04/16/2021 103  98 - 107 mmol/L Final   • CO2 04/16/2021 25.5  22.0 - 29.0 mmol/L Final   • Calcium 04/16/2021 9.1  8.6 - 10.5 mg/dL Final   • Total Protein 04/16/2021 6.9  6.0 - 8.5 g/dL Final   • Albumin 04/16/2021 4.10  3.50 - 5.20 g/dL Final   • ALT (SGPT) 04/16/2021 10  1 - 33 U/L Final   • AST (SGOT) 04/16/2021 15  1 - 32 U/L Final   • Alkaline Phosphatase 04/16/2021 92  39 - 117 U/L Final   • Total Bilirubin 04/16/2021 <0.2  0.0 - 1.2 mg/dL Final   • eGFR Non  Amer 04/16/2021 87  >60 mL/min/1.73 Final   • Globulin 04/16/2021 2.8  gm/dL Final   • A/G Ratio 04/16/2021 1.5  g/dL Final   • BUN/Creatinine Ratio 04/16/2021 13.7  7.0 - 25.0 Final   • Anion Gap 04/16/2021 9.5  5.0 - 15.0 mmol/L Final   • Ferritin 04/16/2021 79.29  13.00 - 150.00 ng/mL Final   • WBC 04/16/2021 8.35  3.40 - 10.80 10*3/mm3 Final   • RBC 04/16/2021 4.29  3.77 - 5.28 10*6/mm3 Final   • Hemoglobin 04/16/2021 11.2* 12.0 - 15.9 g/dL Final   • Hematocrit 04/16/2021 34.7  34.0 - 46.6 % Final   • MCV 04/16/2021 80.9  79.0 - 97.0 fL Final   • MCH 04/16/2021 26.1* 26.6 - 33.0 pg Final   • MCHC 04/16/2021 32.3  31.5 - 35.7 g/dL Final   • RDW 04/16/2021 17.2* 12.3 - 15.4 " % Final   • RDW-SD 04/16/2021 49.3  37.0 - 54.0 fl Final   • MPV 04/16/2021 11.6  6.0 - 12.0 fL Final   • Platelets 04/16/2021 371  140 - 450 10*3/mm3 Final      CT Abdomen Pelvis With Contrast  Result Date: 12/15/2020  Acute colitis, most severe involving the ascending colon.  Mild free fluid.  Recommend appropriate close follow-up.     Colonoscopy was completed on 11/17/2020 by Dr. Brizuela. Findings were:  - Hemorrhoids found on perianal exam.  - Pancolitis ulcerative colitis. Inflammation was found from the rectum to the hepatic flexure. This was severe,  new compared to previous examinations. Biopsied. Dispersed ulceration and colitis identified proximal to HF upt  to ceacum  - Separate biopsy was sent to rule out CMV ulceration.  - Non-bleeding external hemorrhoids.  - The examined portion of the ileum was normal. Biopsied.  Pathology showed ileal mucosa with no significant histopathologic abnormality of the terminal ileum, cecal biopsy showed mildly active colitis without dysplasia, ascending colon polyps he showed mildly active colitis, transverse colon biopsy showed moderately active colitis, transverse colon biopsies showed severely active colitis with ulceration, descending colon biopsy showed moderately active colitis with ulceration, avoid colon biopsy showed moderately active colitis, rectal biopsy showed mildly active colitis with ulceration.    Assessment / Plan      1. Ulcerative pancolitis (CMS/HCC)  6/9/2021  Her symptoms of the ulcerative colitis are currently not controlled. She is still having 7 days stools daily, rectal bleeding and left-sided abdominal pain. She has been taking azathioprine 50 mg 2 tablets once daily plus mesalamine (Apriso) 4 capsules once daily exactly as directed. She will complete lab testing today including CBC, CMP, iron profile and C-reactive protein. She will also have stool testing to rule out any infection including C. difficile and GI panel. Stool calprotectin will  gauge bowel inflammation. Her previous calprotectin was elevated at 384 prior to starting the azathioprine.  She may need step up in therapy for treatment of her UC.  - CBC Auto Differential; Future  - Comprehensive Metabolic Panel; Future  - Iron Profile; Future  - Clostridium Difficile Toxin, PCR - Stool, Per Rectum; Future  - Gastrointestinal Panel, PCR - Stool, Per Rectum; Future  - Calprotectin, Fecal - Stool, Per Rectum; Future  - C-reactive Protein; Future    3/10/2021  She is feeling better with minimal abdominal pain but still having loose stools and intermittent rectal bleeding. She is taking mesalamine (Apriso) but did not start previously recommended azathioprine (Imuran). For now, I have asked her to complete basic labs as well as iron profile, CRP, B12, vit D and calprotectin stool. Results will be reviewed and depending on those results, may hold off on starting Imuran due to patient preference.   She has already had screening labs needed before starting immunosuppressive therapy.   Reminded her that it is recommended that she get covid vaccination.      12/9/2020   she had a colonoscopy done on 11/17/2020 which revealed extensive diffuse colitis involving the colon up to the hepatic flexure from the rectum.  Dispersed inflammation noted from hyperflexion to the cecum.  Anaspaz normal.  Examined terminal ileum for about 15 cm from IC valve was normal.   Pathology revealed acute severe colitis with crypt abscess.  Patient had chronic diarrhea more than 2 months. These findings are more in favor of ulcerative colitis.   Recent TB Gold test was negative.  Chronic hepatitis panel was negative for any chronic hep C or hep B infection.   She is currently on a tapering dose of steroids  She would definitely benefit from Biologics however at this time patient's insurance does not cover Remicade or the Humira or newer Biologics.   We will start her on Apriso along with the Imuran and see how she does with that  regimen.   If no significant improvement with that for the next 6 months to 12 months we will consider Biologics and get a prior authorization  Started on Apriso 0.375 g p.o. 4 capsules daily  We will add Imuran 100 mg p.o. daily when once a TPMT genetics available  She is immune to hepatitis A but not to hepatitis B.  She needs a hep B vaccine as outpatient with the booster  Advised flu vaccine and COVID-19 vaccine     11/10/2020  Acute episode of bloody diarrhea, which has progressed now to chronic.  Symptoms more suggestive infective colitis however IBD cannot be ruled out.   She states that she finished 10-day course of Augmentin given at the ED but she still has ongoing bloody diarrhea with minimal left lower quadrant abdominal pain.   Her basic lab work done in the emergency room 2 weeks ago did not reveal any significant abnormality except borderline leukocytosis.  CT of the abdomen pelvis done revealed a thickened descending colon and the sigmoid colon suggesting colitis.  No family history of any IBD.   Given her persistent symptoms we will schedule her for colonoscopy for further evaluation  We will also get a stool for C. difficile and the stool for GI panel    2. Iron deficiency anemia due to chronic blood loss  6/9/2021  She has continued iron supplement daily as directed. Iron was previously low at 22. She continues to have rectal bleeding with each bowel movement. We will recheck CBC and iron today. Continue iron supplement for now.  - CBC Auto Differential; Future  - Iron Profile; Future              Follow Up:   Return in about 6 weeks (around 7/21/2021) for recheck UC.      Leslie Dalton PA-C  [facility] [place]  6/9/2021  15:51 EDT    Please note that portions of this note may have been completed with a voice recognition program. Efforts were made to edit the dictations, but occasionally words are mistranscribed.

## 2021-06-09 NOTE — TELEPHONE ENCOUNTER
I called pt about C diff positive today, she is aware of these reuslts. She will take vancomycin. It needs a pre-auth. She will need this med as soon as possible.

## 2021-06-10 ENCOUNTER — PRIOR AUTHORIZATION (OUTPATIENT)
Dept: GASTROENTEROLOGY | Facility: CLINIC | Age: 46
End: 2021-06-10

## 2021-06-11 LAB — CALPROTECTIN STL-MCNT: 1787 UG/G (ref 0–120)

## 2021-06-16 ENCOUNTER — TELEPHONE (OUTPATIENT)
Dept: GASTROENTEROLOGY | Facility: CLINIC | Age: 46
End: 2021-06-16

## 2021-06-16 NOTE — TELEPHONE ENCOUNTER
Called patient, patient informed.  She states frequency of diarrhea is better, however, diarrhea still continues.

## 2021-06-16 NOTE — TELEPHONE ENCOUNTER
----- Message from Leslie Dalton PA-C sent at 6/14/2021  2:34 PM EDT -----  Ok to take tylenol to bring down fever as needed, is she still having diarrhea?  ----- Message -----  From: Maya Ratliff MA  Sent: 6/14/2021   2:14 PM EDT  To: Leslie Dalton PA-C    Patient is running a fever, 101.5, and wants to know if this is normal with c. Diff.  She also states she has not taken the Apriso for a few days due to availability.  Called the pharmacy, and her insurance requires her to have the name brand.  Apriso is ordered for her.

## 2021-07-13 DIAGNOSIS — E61.1 IRON DEFICIENCY: ICD-10-CM

## 2021-07-13 RX ORDER — FERROUS SULFATE 325(65) MG
325 TABLET ORAL
Qty: 30 TABLET | Refills: 3 | Status: SHIPPED | OUTPATIENT
Start: 2021-07-13 | End: 2021-11-11

## 2021-07-27 ENCOUNTER — OFFICE VISIT (OUTPATIENT)
Dept: GASTROENTEROLOGY | Facility: CLINIC | Age: 46
End: 2021-07-27

## 2021-07-27 ENCOUNTER — LAB (OUTPATIENT)
Dept: LAB | Facility: HOSPITAL | Age: 46
End: 2021-07-27

## 2021-07-27 VITALS
DIASTOLIC BLOOD PRESSURE: 97 MMHG | TEMPERATURE: 98.2 F | HEART RATE: 89 BPM | WEIGHT: 141 LBS | SYSTOLIC BLOOD PRESSURE: 133 MMHG | BODY MASS INDEX: 24.07 KG/M2 | RESPIRATION RATE: 12 BRPM | HEIGHT: 64 IN

## 2021-07-27 DIAGNOSIS — K51.00 ULCERATIVE PANCOLITIS (HCC): ICD-10-CM

## 2021-07-27 DIAGNOSIS — D50.9 IRON DEFICIENCY ANEMIA, UNSPECIFIED IRON DEFICIENCY ANEMIA TYPE: ICD-10-CM

## 2021-07-27 DIAGNOSIS — K51.00 ULCERATIVE PANCOLITIS (HCC): Primary | ICD-10-CM

## 2021-07-27 DIAGNOSIS — R11.0 NAUSEA: ICD-10-CM

## 2021-07-27 LAB
ALBUMIN SERPL-MCNC: 4.5 G/DL (ref 3.5–5.2)
ALBUMIN/GLOB SERPL: 1.6 G/DL
ALP SERPL-CCNC: 96 U/L (ref 39–117)
ALT SERPL W P-5'-P-CCNC: 9 U/L (ref 1–33)
ANION GAP SERPL CALCULATED.3IONS-SCNC: 11 MMOL/L (ref 5–15)
AST SERPL-CCNC: 16 U/L (ref 1–32)
BASOPHILS # BLD AUTO: 0.04 10*3/MM3 (ref 0–0.2)
BASOPHILS NFR BLD AUTO: 0.5 % (ref 0–1.5)
BILIRUB SERPL-MCNC: 0.2 MG/DL (ref 0–1.2)
BUN SERPL-MCNC: 5 MG/DL (ref 6–20)
BUN/CREAT SERPL: 7.4 (ref 7–25)
CALCIUM SPEC-SCNC: 9.6 MG/DL (ref 8.6–10.5)
CHLORIDE SERPL-SCNC: 104 MMOL/L (ref 98–107)
CO2 SERPL-SCNC: 26 MMOL/L (ref 22–29)
CREAT SERPL-MCNC: 0.68 MG/DL (ref 0.57–1)
CRP SERPL-MCNC: 0.69 MG/DL (ref 0–0.5)
DEPRECATED RDW RBC AUTO: 54 FL (ref 37–54)
EOSINOPHIL # BLD AUTO: 0.11 10*3/MM3 (ref 0–0.4)
EOSINOPHIL NFR BLD AUTO: 1.5 % (ref 0.3–6.2)
ERYTHROCYTE [DISTWIDTH] IN BLOOD BY AUTOMATED COUNT: 16.7 % (ref 12.3–15.4)
GFR SERPL CREATININE-BSD FRML MDRD: 94 ML/MIN/1.73
GLOBULIN UR ELPH-MCNC: 2.9 GM/DL
GLUCOSE SERPL-MCNC: 81 MG/DL (ref 65–99)
HCT VFR BLD AUTO: 32.8 % (ref 34–46.6)
HGB BLD-MCNC: 10.6 G/DL (ref 12–15.9)
IMM GRANULOCYTES # BLD AUTO: 0.03 10*3/MM3 (ref 0–0.05)
IMM GRANULOCYTES NFR BLD AUTO: 0.4 % (ref 0–0.5)
IRON 24H UR-MRATE: 60 MCG/DL (ref 37–145)
IRON SATN MFR SERPL: 18 % (ref 20–50)
LYMPHOCYTES # BLD AUTO: 1.23 10*3/MM3 (ref 0.7–3.1)
LYMPHOCYTES NFR BLD AUTO: 16.8 % (ref 19.6–45.3)
MCH RBC QN AUTO: 29 PG (ref 26.6–33)
MCHC RBC AUTO-ENTMCNC: 32.3 G/DL (ref 31.5–35.7)
MCV RBC AUTO: 89.9 FL (ref 79–97)
MONOCYTES # BLD AUTO: 0.61 10*3/MM3 (ref 0.1–0.9)
MONOCYTES NFR BLD AUTO: 8.3 % (ref 5–12)
NEUTROPHILS NFR BLD AUTO: 5.31 10*3/MM3 (ref 1.7–7)
NEUTROPHILS NFR BLD AUTO: 72.5 % (ref 42.7–76)
NRBC BLD AUTO-RTO: 0 /100 WBC (ref 0–0.2)
PLATELET # BLD AUTO: 487 10*3/MM3 (ref 140–450)
PMV BLD AUTO: 10.8 FL (ref 6–12)
POTASSIUM SERPL-SCNC: 4.1 MMOL/L (ref 3.5–5.2)
PROT SERPL-MCNC: 7.4 G/DL (ref 6–8.5)
RBC # BLD AUTO: 3.65 10*6/MM3 (ref 3.77–5.28)
SODIUM SERPL-SCNC: 141 MMOL/L (ref 136–145)
TIBC SERPL-MCNC: 340 MCG/DL (ref 298–536)
TRANSFERRIN SERPL-MCNC: 228 MG/DL (ref 200–360)
WBC # BLD AUTO: 7.33 10*3/MM3 (ref 3.4–10.8)

## 2021-07-27 PROCEDURE — 36415 COLL VENOUS BLD VENIPUNCTURE: CPT

## 2021-07-27 PROCEDURE — 84466 ASSAY OF TRANSFERRIN: CPT

## 2021-07-27 PROCEDURE — 80053 COMPREHEN METABOLIC PANEL: CPT

## 2021-07-27 PROCEDURE — 99214 OFFICE O/P EST MOD 30 MIN: CPT | Performed by: PHYSICIAN ASSISTANT

## 2021-07-27 PROCEDURE — 85025 COMPLETE CBC W/AUTO DIFF WBC: CPT

## 2021-07-27 PROCEDURE — 86140 C-REACTIVE PROTEIN: CPT

## 2021-07-27 PROCEDURE — 83540 ASSAY OF IRON: CPT

## 2021-07-27 RX ORDER — ONDANSETRON 4 MG/1
4 TABLET, ORALLY DISINTEGRATING ORAL EVERY 8 HOURS PRN
Qty: 42 TABLET | Refills: 2 | Status: SHIPPED | OUTPATIENT
Start: 2021-07-27 | End: 2022-03-14

## 2021-07-27 NOTE — PROGRESS NOTES
Follow Up Note     Date: 2021   Patient Name: Stacy Guardado  MRN: [id number]  : 1975     Primary Care Provider: Shayna Swartz PA     Chief Complaint   Patient presents with   • Follow-up   • Inflammatory Bowel Disease   • Nausea     History of present illness:   2021  Stacy Guardado is a 45 y.o. female who is here today for follow up regarding Inflammatory Bowel Disease, and Nausea.    Feeling dramatically better overall. Still taking probiotic and iron supplement daily. Still taking Apriso 4 capsules in the morning plus Imuran 50 mg 2 tablets daily for UC. She took antibiotics (vancomycin) as prescribed for treatment of c diff found on last stool testing. She had improvement in foul smelling stools and abdominal pain about half way through treatment with vancomycin. She still has intermittent nausea, occurring several days per week, requests zofran to take as needed which has helped in the past.  Takes her medications with food. Reports still having 5-6 loose stools per day which mostly occur after meals. No rectal bleeding. Last labs in 2021.     Interval History:  2021  She complains that she is still having diarrhea, rectal bleeding abdominal pain. She has 7 days stools daily and this has been her normal over the past 3 months. Rectal bleeding is present with the stool, stools are described as soft and formed. She does not have hard stools. Stools are not watery. Abdominal pain is located in the left side of her abdomen, mostly on the left lower quadrant. Pain is now relieved with a bowel movement. She had 1 large abnormal stool that had appearance of a white string in it. She is uncertain if this was mucus. Since her last visit, she has been taking azathioprine 50 mg 2 tablets once daily plus Apriso 4 capsules in the morning for treatment of her ulcerative colitis. Still taking iron supplement as directed. She had labs last visit and would like to discuss those  results.      3/10/2021  Since her last visit, she reports an overall improvement in her illness. She feels better but still has 5 stools per day which are described as small soft pieces of stool or sometimes loose in consistency. She has intermittent bright red rectal bleeding with 1-2 stools intermittently. Blood is described as mixed in with stool but not large in amount. This occurs about 1 time per week over the past 1.5 months. She is only taking Apriso 4 caps in the morning daily, never started azathioprine as directed because she was confused as to why she should take it and worried about the side effects. Complains of current fatigue and cold intolerance. Is not currently taking iron supplements but has a history of TOMI. She has rare left sided flank pain which is mild to moderate when it occurs. Her new diagnosis of ulcerative colitis was made after colonoscopy was completed in 11/2020.      12/9/2020  Stacy Guardado is a 45 y.o. female who is here today for follow up for IBD diarrhea. Her diarrhea improved. 4-5 times small soft stool without blood and 3-4 times soft small stool at night.  She will get occasional cramps diffuse.  She was recently discharged from the hospital after being treated for recurrent diarrhea blood in the stool.  She is currently taking prednisone tapering dose  She had a colonoscopy and she is here to discuss the pathology report and further management..      11/10/2020  Stacy Guardado is a 45 y.o. female who is here today to establish care with Gastroenterology for evaluation of recent diarrhea with blood.   She states that she noted to have red blood in stool three weeks ago and that got worse with loose stool.She also develeloped LLQ abdominal pain. Loose stool 5-7 times daily. Deny any fever chills. Patient was in emergency room 2 weeks ago with complaints of diarrhea abdominal pain and blood in the stool.  She had basic lab work done which revealed borderline  leukocytosis.  She deny any prior constipation. She also had a CT scan of the abdomen pelvis done which revealed thickening in the descending colon the sigmoid suggesting colitis. She was given Augmentin for ten days.    Weight is stable. Pt denies nausea vomiting or odynophagia or dysphagia. There is no history of acid reflux. There is no history of anemia. No prior history of EGD or colonoscopy. No family history of colon cancer or any GI malignancy, or IBD. No history of any abdominal surgery. Denies alcohol abuse or cigarette smoking.     Subjective     Past Medical History:   Diagnosis Date   • Abnormal ECG    • Bipolar affective (CMS/HCC)    • COPD (chronic obstructive pulmonary disease) (CMS/HCC)    • Current every day smoker     Patient states former smoker   • Palpitations    • Snores     very little   • Tattoos 11/12/2020    x's 3   • Tattoos      Past Surgical History:   Procedure Laterality Date   • COLONOSCOPY N/A 11/17/2020    Procedure: COLONOSCOPY WITH BIOPSIES;  Surgeon: Susan Brizuela MD;  Location: [facility] ENDOSCOPY;  Service: Gastroenterology;  Laterality: N/A;   • WISDOM TOOTH EXTRACTION       Family History   Problem Relation Age of Onset   • Hyperlipidemia Mother    • Hyperlipidemia Father    • Hypertension Father    • Stroke Brother    • Heart attack Maternal Grandfather    • Heart disease Maternal Grandfather    • Heart disease Paternal Grandfather    • Heart attack Paternal Grandfather    • Hyperlipidemia Brother    • Alcohol abuse Other    • Diabetes Other    • Hypertension Other    • Stroke Other    • Colon cancer Neg Hx    • Cirrhosis Neg Hx    • Liver cancer Neg Hx    • Liver disease Neg Hx      Social History     Socioeconomic History   • Marital status:      Spouse name: Not on file   • Number of children: Not on file   • Years of education: Not on file   • Highest education level: Not on file   Tobacco Use   • Smoking status: Former Smoker     Packs/day: 1.00     Types:  Cigarettes     Quit date: 2019     Years since quittin.2   • Smokeless tobacco: Never Used   Vaping Use   • Vaping Use: Some days   • Last attempt to quit: 3/18/2020   • Substances: Nicotine, Flavoring   • Devices: Pre-filled or refillable cartridge, Refillable tank   Substance and Sexual Activity   • Alcohol use: Yes     Comment: socially   • Drug use: No   • Sexual activity: Defer       Current Outpatient Medications:   •  azaTHIOprine (IMURAN) 50 MG tablet, Take 2 tablets by mouth Daily., Disp: 60 tablet, Rfl: 5  •  cetirizine (zyrTEC) 10 MG tablet, Take 10 mg by mouth Daily., Disp: , Rfl:   •  ferrous sulfate 325 (65 FE) MG tablet, Take 1 tablet by mouth Daily With Breakfast., Disp: 30 tablet, Rfl: 3  •  mesalamine (APRISO) 0.375 g 24 hr capsule, Take 4 capsules by mouth Daily., Disp: 120 capsule, Rfl: 5  •  Probiotic Product (PROBIOTIC DAILY PO), Take  by mouth., Disp: , Rfl:   •  ondansetron ODT (ZOFRAN-ODT) 4 MG disintegrating tablet, Place 1 tablet on the tongue Every 8 (Eight) Hours As Needed for Nausea or Vomiting., Disp: 42 tablet, Rfl: 2    Allergies   Allergen Reactions   • Augmentin [Amoxicillin-Pot Clavulanate] Diarrhea and GI Intolerance       The following portions of the patient's history were reviewed and updated as appropriate: allergies, current medications, past family history, past medical history, past social history, past surgical history and problem list.    Objective     Physical Exam  Vitals reviewed.   Constitutional:       General: She is not in acute distress.     Appearance: Normal appearance. She is well-developed. She is not ill-appearing or diaphoretic.   HENT:      Head: Normocephalic and atraumatic.      Right Ear: External ear normal.      Left Ear: External ear normal.      Nose: Nose normal.      Mouth/Throat:      Comments: Wearing a mask  Eyes:      General: No scleral icterus.        Right eye: No discharge.         Left eye: No discharge.      Conjunctiva/sclera:  "Conjunctivae normal.   Neck:      Vascular: No JVD.   Cardiovascular:      Rate and Rhythm: Normal rate and regular rhythm.      Heart sounds: Normal heart sounds. No murmur heard.   No friction rub. No gallop.    Pulmonary:      Effort: Pulmonary effort is normal. No respiratory distress.      Breath sounds: Normal breath sounds. No wheezing or rales.   Chest:      Chest wall: No tenderness.   Abdominal:      General: Bowel sounds are normal. There is no distension.      Palpations: Abdomen is soft. There is no mass.      Tenderness: There is no abdominal tenderness. There is no guarding.   Musculoskeletal:         General: No deformity. Normal range of motion.      Cervical back: Normal range of motion.   Skin:     General: Skin is warm and dry.      Findings: No erythema or rash.   Neurological:      Mental Status: She is alert and oriented to person, place, and time.      Coordination: Coordination normal.   Psychiatric:         Mood and Affect: Mood normal.         Behavior: Behavior normal.         Thought Content: Thought content normal.         Judgment: Judgment normal.       Vitals:    07/27/21 1035   BP: 133/97   Pulse: 89   Resp: 12   Temp: 98.2 °F (36.8 °C)   TempSrc: Infrared   Weight: 64 kg (141 lb)   Height: 162.6 cm (64\")       Results Review:   I reviewed the patient's new clinical results.    Lab on 06/09/2021   Component Date Value Ref Range Status   • Iron 06/09/2021 43  37 - 145 mcg/dL Final   • Iron Saturation 06/09/2021 12* 20 - 50 % Final   • Transferrin 06/09/2021 233  200 - 360 mg/dL Final   • TIBC 06/09/2021 347  298 - 536 mcg/dL Final   • WBC 06/09/2021 11.11* 3.40 - 10.80 10*3/mm3 Final   • RBC 06/09/2021 4.21  3.77 - 5.28 10*6/mm3 Final   • Hemoglobin 06/09/2021 11.7* 12.0 - 15.9 g/dL Final   • Hematocrit 06/09/2021 37.5  34.0 - 46.6 % Final   • MCV 06/09/2021 89.1  79.0 - 97.0 fL Final   • MCH 06/09/2021 27.8  26.6 - 33.0 pg Final   • MCHC 06/09/2021 31.2* 31.5 - 35.7 g/dL Final   • " RDW 06/09/2021 18.2* 12.3 - 15.4 % Final   • RDW-SD 06/09/2021 59.3* 37.0 - 54.0 fl Final   • MPV 06/09/2021 11.2  6.0 - 12.0 fL Final   • Platelets 06/09/2021 492* 140 - 450 10*3/mm3 Final   • Neutrophil % 06/09/2021 72.2  42.7 - 76.0 % Final   • Lymphocyte % 06/09/2021 14.5* 19.6 - 45.3 % Final   • Monocyte % 06/09/2021 7.6  5.0 - 12.0 % Final   • Eosinophil % 06/09/2021 4.7  0.3 - 6.2 % Final   • Basophil % 06/09/2021 0.5  0.0 - 1.5 % Final   • Immature Grans % 06/09/2021 0.5  0.0 - 0.5 % Final   • Neutrophils, Absolute 06/09/2021 8.03* 1.70 - 7.00 10*3/mm3 Final   • Lymphocytes, Absolute 06/09/2021 1.61  0.70 - 3.10 10*3/mm3 Final   • Monocytes, Absolute 06/09/2021 0.84  0.10 - 0.90 10*3/mm3 Final   • Eosinophils, Absolute 06/09/2021 0.52* 0.00 - 0.40 10*3/mm3 Final   • Basophils, Absolute 06/09/2021 0.06  0.00 - 0.20 10*3/mm3 Final   • Immature Grans, Absolute 06/09/2021 0.05  0.00 - 0.05 10*3/mm3 Final   • nRBC 06/09/2021 0.0  0.0 - 0.2 /100 WBC Final   • Glucose 06/09/2021 75  65 - 99 mg/dL Final   • BUN 06/09/2021 6  6 - 20 mg/dL Final   • Creatinine 06/09/2021 0.62  0.57 - 1.00 mg/dL Final   • Sodium 06/09/2021 141  136 - 145 mmol/L Final   • Potassium 06/09/2021 4.2  3.5 - 5.2 mmol/L Final   • Chloride 06/09/2021 102  98 - 107 mmol/L Final   • CO2 06/09/2021 26.4  22.0 - 29.0 mmol/L Final   • Calcium 06/09/2021 9.8  8.6 - 10.5 mg/dL Final   • Total Protein 06/09/2021 7.9  6.0 - 8.5 g/dL Final   • Albumin 06/09/2021 4.30  3.50 - 5.20 g/dL Final   • ALT (SGPT) 06/09/2021 12  1 - 33 U/L Final   • AST (SGOT) 06/09/2021 18  1 - 32 U/L Final   • Alkaline Phosphatase 06/09/2021 102  39 - 117 U/L Final   • Total Bilirubin 06/09/2021 0.2  0.0 - 1.2 mg/dL Final   • eGFR Non African Amer 06/09/2021 105  >60 mL/min/1.73 Final   • Globulin 06/09/2021 3.6  gm/dL Final   • A/G Ratio 06/09/2021 1.2  g/dL Final   • BUN/Creatinine Ratio 06/09/2021 9.7  7.0 - 25.0 Final   • Anion Gap 06/09/2021 12.6  5.0 - 15.0 mmol/L Final    • C-Reactive Protein 06/09/2021 2.55* 0.00 - 0.50 mg/dL Final   • C. Difficile Toxins by PCR 06/09/2021 Detected* Not Detected Final   • Campylobacter 06/09/2021 Not Detected  Not Detected Final   • Plesiomonas shigelloides 06/09/2021 Not Detected  Not Detected Final   • Salmonella 06/09/2021 Not Detected  Not Detected Final   • Vibrio 06/09/2021 Not Detected  Not Detected Final   • Vibrio cholerae 06/09/2021 Not Detected  Not Detected Final   • Yersinia enterocolitica 06/09/2021 Not Detected  Not Detected Final   • Enteroaggregative E. coli (EAEC) 06/09/2021 Not Detected  Not Detected Final   • Enteropathogenic E. coli (EPEC) 06/09/2021 Not Detected  Not Detected Final   • Enterotoxigenic E. coli (ETEC) lt/* 06/09/2021 Not Detected  Not Detected Final   • Shiga-like toxin-producing E. coli* 06/09/2021 Not Detected  Not Detected Final   • Shigella/Enteroinvasive E. coli (E* 06/09/2021 Not Detected  Not Detected Final   • Cryptosporidium 06/09/2021 Not Detected  Not Detected Final   • Cyclospora cayetanensis 06/09/2021 Not Detected  Not Detected Final   • Entamoeba histolytica 06/09/2021 Not Detected  Not Detected Final   • Giardia lamblia 06/09/2021 Not Detected  Not Detected Final   • Adenovirus F40/41 06/09/2021 Not Detected  Not Detected Final   • Astrovirus 06/09/2021 Not Detected  Not Detected Final   • Norovirus GI/GII 06/09/2021 Not Detected  Not Detected Final   • Rotavirus A 06/09/2021 Not Detected  Not Detected Final   • Sapovirus (I, II, IV or V) 06/09/2021 Not Detected  Not Detected Final   • Calprotectin, Fecal 06/09/2021 1787* 0 - 120 ug/g Final    Concentration     Interpretation   Follow-Up  <16 - 50 ug/g     Normal           None  >50 -120 ug/g     Borderline       Re-evaluate in 4-6 weeks      >120 ug/g     Abnormal         Repeat as clinically                                     indicated      CT Abdomen Pelvis With Contrast  Result Date: 12/15/2020  Acute colitis, most severe involving the  ascending colon.  Mild free fluid.  Recommend appropriate close follow-up.     Colonoscopy was completed on 11/17/2020 by Dr. Brizuela. Findings were:  - Hemorrhoids found on perianal exam.  - Pancolitis ulcerative colitis. Inflammation was found from the rectum to the hepatic flexure. This was severe,  new compared to previous examinations. Biopsied. Dispersed ulceration and colitis identified proximal to HF upt  to ceacum  - Separate biopsy was sent to rule out CMV ulceration.  - Non-bleeding external hemorrhoids.  - The examined portion of the ileum was normal. Biopsied.  Pathology showed ileal mucosa with no significant histopathologic abnormality of the terminal ileum, cecal biopsy showed mildly active colitis without dysplasia, ascending colon polyps he showed mildly active colitis, transverse colon biopsy showed moderately active colitis, transverse colon biopsies showed severely active colitis with ulceration, descending colon biopsy showed moderately active colitis with ulceration, avoid colon biopsy showed moderately active colitis, rectal biopsy showed mildly active colitis with ulceration.    Assessment / Plan      1. Ulcerative pancolitis  7/27/2021  She is feeling better now than she has in months. She has continued medications for treatment of UC as directed. Will continue apriso 4 capsules every morning plus azathioprine 50 mg 2 tablets PO daily. She already had COVID injection (sergio and sergio) Reviewed previous lab results with her in detail today. CRP was significantly elevated but she also had positive C diff at that time. Stool frequency is still more than preferred with 5 stools daily which are loose. No current rectal bleeding and no abdominal pain. Labs today including CBC, CMP, CRP and iron profile.   - CBC Auto Differential; Future  - Comprehensive Metabolic Panel; Future  - C-reactive Protein; Future  - Iron Profile; Future    6/9/2021  Her symptoms of the ulcerative colitis are  currently not controlled. She is still having 7 days stools daily, rectal bleeding and left-sided abdominal pain. She has been taking azathioprine 50 mg 2 tablets once daily plus mesalamine (Apriso) 4 capsules once daily exactly as directed. She will complete lab testing today including CBC, CMP, iron profile and C-reactive protein. She will also have stool testing to rule out any infection including C. difficile and GI panel. Stool calprotectin will gauge bowel inflammation. Her previous calprotectin was elevated at 384 prior to starting the azathioprine.  She may need step up in therapy for treatment of her UC.     3/10/2021  She is feeling better with minimal abdominal pain but still having loose stools and intermittent rectal bleeding. She is taking mesalamine (Apriso) but did not start previously recommended azathioprine (Imuran). For now, I have asked her to complete basic labs as well as iron profile, CRP, B12, vit D and calprotectin stool. Results will be reviewed and depending on those results, may hold off on starting Imuran due to patient preference.   She has already had screening labs needed before starting immunosuppressive therapy.   Reminded her that it is recommended that she get covid vaccination.      12/9/2020   she had a colonoscopy done on 11/17/2020 which revealed extensive diffuse colitis involving the colon up to the hepatic flexure from the rectum.  Dispersed inflammation noted from hyperflexion to the cecum.  Anaspaz normal.  Examined terminal ileum for about 15 cm from IC valve was normal.   Pathology revealed acute severe colitis with crypt abscess.  Patient had chronic diarrhea more than 2 months. These findings are more in favor of ulcerative colitis.   Recent TB Gold test was negative.  Chronic hepatitis panel was negative for any chronic hep C or hep B infection.   She is currently on a tapering dose of steroids  She would definitely benefit from Biologics however at this time  patient's insurance does not cover Remicade or the Humira or newer Biologics.   We will start her on Apriso along with the Imuran and see how she does with that regimen.   If no significant improvement with that for the next 6 months to 12 months we will consider Biologics and get a prior authorization  Started on Apriso 0.375 g p.o. 4 capsules daily  We will add Imuran 100 mg p.o. daily when once a TPMT genetics available  She is immune to hepatitis A but not to hepatitis B.  She needs a hep B vaccine as outpatient with the booster  Advised flu vaccine and COVID-19 vaccine     11/10/2020  Acute episode of bloody diarrhea, which has progressed now to chronic.  Symptoms more suggestive infective colitis however IBD cannot be ruled out.   She states that she finished 10-day course of Augmentin given at the ED but she still has ongoing bloody diarrhea with minimal left lower quadrant abdominal pain.   Her basic lab work done in the emergency room 2 weeks ago did not reveal any significant abnormality except borderline leukocytosis.  CT of the abdomen pelvis done revealed a thickened descending colon and the sigmoid colon suggesting colitis.  No family history of any IBD.   Given her persistent symptoms we will schedule her for colonoscopy for further evaluation  We will also get a stool for C. difficile and the stool for GI panel    2. Nausea  7/27/2021   She has continued to have nausea despite clinical improvement in her UC. Can take zofran as needed for relief of nausea for now. Recommend to continue daily probiotic for now. Will arrange EGD.   - ondansetron ODT (ZOFRAN-ODT) 4 MG disintegrating tablet; Place 1 tablet on the tongue Every 8 (Eight) Hours As Needed for Nausea or Vomiting.  Dispense: 42 tablet; Refill: 2    She will need an esophagogastroduodenoscopy performed with monitored anesthesia care. The indications, technique, alternatives and potential risk and complications were discussed with the patient  including but not limited to bleeding, intestinal perforations, missing lesions and anesthetic complications. The patient understands and wishes to proceed with the procedure and has given their verbal consent. Written patient education information was given to the patient. She should follow up in the office after this procedure to discuss the results and further recommendations can be made at that time. The patient will call if they have further questions before procedure.  - Case Request    3. Iron deficiency anemia, unspecified iron deficiency anemia type  7/27/2021  Continues to take iron supplement daily as directed, will re-check iron today. She is no longer having rectal bleeding.   - Iron Profile; Future    6/9/2021  She has continued iron supplement daily as directed. Iron was previously low at 22. She continues to have rectal bleeding with each bowel movement. We will recheck CBC and iron today. Continue iron supplement for now.  - CBC Auto Differential; Future  - Iron Profile; Future     4. History of c difficile  7/27/2021  She was negative for C diff during hospitalization and initial diagnosis of UC. She has positive C diff stool test in June 2021 and treated with vancomycin. She had relief from foul smelling diarrhea within a few days of starting antibiotics. Started probiotic daily after treatment completed. Still having 5 loose stools daily.           Follow Up:   Return in about 3 months (around 10/27/2021) for recheck UC. Follow up after EGD.       Leslie Dalton PA-C  [facility] [place]  7/29/2021  16:44 EDT    Please note that portions of this note may have been completed with a voice recognition program. Efforts were made to edit the dictations, but occasionally words are mistranscribed.

## 2021-07-29 ENCOUNTER — TELEPHONE (OUTPATIENT)
Dept: GASTROENTEROLOGY | Facility: CLINIC | Age: 46
End: 2021-07-29

## 2021-07-29 RX ORDER — SODIUM CHLORIDE 9 MG/ML
30 INJECTION, SOLUTION INTRAVENOUS CONTINUOUS PRN
Status: CANCELLED | OUTPATIENT
Start: 2021-07-29

## 2021-07-29 NOTE — TELEPHONE ENCOUNTER
Can you call this pt to let her know I would like her to arrange EGD? I have placed CR. Tell her that her iron is some better but Hgb (blood count) lower than previous. I want to make sure she does not have an ulcer with her continued nausea and anemia.

## 2021-07-30 ENCOUNTER — LAB (OUTPATIENT)
Dept: LAB | Facility: HOSPITAL | Age: 46
End: 2021-07-30

## 2021-07-30 DIAGNOSIS — Z01.818 PREOP TESTING: ICD-10-CM

## 2021-07-30 DIAGNOSIS — Z01.818 PREOP TESTING: Primary | ICD-10-CM

## 2021-07-30 PROBLEM — R11.0 NAUSEA: Status: ACTIVE | Noted: 2021-07-30

## 2021-07-30 PROBLEM — D50.9 IRON DEFICIENCY ANEMIA: Status: ACTIVE | Noted: 2021-07-30

## 2021-07-30 PROCEDURE — C9803 HOPD COVID-19 SPEC COLLECT: HCPCS

## 2021-07-30 PROCEDURE — U0004 COV-19 TEST NON-CDC HGH THRU: HCPCS

## 2021-07-31 LAB — SARS-COV-2 RNA PNL SPEC NAA+PROBE: NOT DETECTED

## 2021-08-02 ENCOUNTER — ANESTHESIA EVENT (OUTPATIENT)
Dept: GASTROENTEROLOGY | Facility: HOSPITAL | Age: 46
End: 2021-08-02

## 2021-08-02 ENCOUNTER — HOSPITAL ENCOUNTER (OUTPATIENT)
Facility: HOSPITAL | Age: 46
Setting detail: HOSPITAL OUTPATIENT SURGERY
Discharge: HOME | End: 2021-08-02
Attending: INTERNAL MEDICINE | Admitting: INTERNAL MEDICINE

## 2021-08-02 ENCOUNTER — ANESTHESIA (OUTPATIENT)
Dept: GASTROENTEROLOGY | Facility: HOSPITAL | Age: 46
End: 2021-08-02

## 2021-08-02 VITALS
SYSTOLIC BLOOD PRESSURE: 101 MMHG | OXYGEN SATURATION: 99 % | DIASTOLIC BLOOD PRESSURE: 60 MMHG | HEIGHT: 64 IN | RESPIRATION RATE: 18 BRPM | WEIGHT: 138 LBS | HEART RATE: 68 BPM | TEMPERATURE: 98.3 F | BODY MASS INDEX: 23.56 KG/M2

## 2021-08-02 DIAGNOSIS — R11.0 NAUSEA: ICD-10-CM

## 2021-08-02 DIAGNOSIS — D50.9 IRON DEFICIENCY ANEMIA, UNSPECIFIED IRON DEFICIENCY ANEMIA TYPE: ICD-10-CM

## 2021-08-02 LAB
B-HCG UR QL: NEGATIVE
INTERNAL NEGATIVE CONTROL: NEGATIVE
INTERNAL POSITIVE CONTROL: POSITIVE
Lab: NORMAL

## 2021-08-02 PROCEDURE — 81025 URINE PREGNANCY TEST: CPT | Performed by: INTERNAL MEDICINE

## 2021-08-02 PROCEDURE — 43239 EGD BIOPSY SINGLE/MULTIPLE: CPT | Performed by: INTERNAL MEDICINE

## 2021-08-02 PROCEDURE — 25010000002 PROPOFOL 1000 MG/100ML EMULSION: Performed by: NURSE ANESTHETIST, CERTIFIED REGISTERED

## 2021-08-02 RX ORDER — PROPOFOL 10 MG/ML
INJECTION, EMULSION INTRAVENOUS AS NEEDED
Status: DISCONTINUED | OUTPATIENT
Start: 2021-08-02 | End: 2021-08-02 | Stop reason: SURG

## 2021-08-02 RX ORDER — SODIUM CHLORIDE 9 MG/ML
30 INJECTION, SOLUTION INTRAVENOUS CONTINUOUS PRN
Status: DISCONTINUED | OUTPATIENT
Start: 2021-08-02 | End: 2021-08-02 | Stop reason: HOSPADM

## 2021-08-02 RX ORDER — LIDOCAINE HYDROCHLORIDE 20 MG/ML
INJECTION, SOLUTION INTRAVENOUS AS NEEDED
Status: DISCONTINUED | OUTPATIENT
Start: 2021-08-02 | End: 2021-08-02 | Stop reason: SURG

## 2021-08-02 RX ADMIN — PROPOFOL 50 MG: 10 INJECTION, EMULSION INTRAVENOUS at 14:33

## 2021-08-02 RX ADMIN — SODIUM CHLORIDE 30 ML/HR: 9 INJECTION, SOLUTION INTRAVENOUS at 13:39

## 2021-08-02 RX ADMIN — LIDOCAINE HYDROCHLORIDE 60 MG: 20 INJECTION, SOLUTION INTRAVENOUS at 14:30

## 2021-08-02 RX ADMIN — PROPOFOL 100 MG: 10 INJECTION, EMULSION INTRAVENOUS at 14:30

## 2021-08-02 NOTE — DISCHARGE INSTRUCTIONS
- Discharge patient to home (ambulatory).   - Resume previous diet.   - Continue present medications.   - Zofran prn   - Await pathology results.   - Will repeat stool calprotectoin in 3 months time and if she has stilll has more than 5 BM, she needs surveillance colonoscopy and possible biologics  - Return to my office in 6 weeks.     No pushing, pulling, tugging,  heavy lifting, or strenuous activity.  No major decision making, driving, or drinking alcoholic beverages for 24 hours. ( due to the medications you have  received)  Always use good hand hygiene/washing techniques.  NO driving while taking pain medications.    Keep the affected extremity elevated above  level of the heart.  Use your ice pack as instructed, do not use continuously.  Use your crutches and or sling as directed  Follow your physicians instructions as previously directed.

## 2021-08-02 NOTE — H&P
[facility]  HISTORY AND PHYSICAL    Patient Name: Stacy Guardado  : 1975  MRN: [id number]    Chief Complaint:   For EGD    History Of Presenting Illness:      Nausea  TOMI  Ulcerative colitis    Past Medical History:   Diagnosis Date   • Abnormal ECG    • Bipolar affective (CMS/HCC)    • COPD (chronic obstructive pulmonary disease) (CMS/HCC)    • Palpitations    • Tattoos 2020    x's 3   • Tattoos        Past Surgical History:   Procedure Laterality Date   • COLONOSCOPY N/A 2020    Procedure: COLONOSCOPY WITH BIOPSIES;  Surgeon: Susan Brizuela MD;  Location: [facility] ENDOSCOPY;  Service: Gastroenterology;  Laterality: N/A;   • WISDOM TOOTH EXTRACTION         Social History     Socioeconomic History   • Marital status:      Spouse name: Not on file   • Number of children: Not on file   • Years of education: Not on file   • Highest education level: Not on file   Tobacco Use   • Smoking status: Former Smoker     Packs/day: 1.00     Types: Cigarettes     Quit date: 2019     Years since quittin.2   • Smokeless tobacco: Never Used   Vaping Use   • Vaping Use: Some days   • Last attempt to quit: 3/18/2020   • Substances: Nicotine, Flavoring   • Devices: Pre-filled or refillable cartridge, Refillable tank   Substance and Sexual Activity   • Alcohol use: Yes     Comment: socially   • Drug use: No   • Sexual activity: Defer       Family History   Problem Relation Age of Onset   • Hyperlipidemia Mother    • Hyperlipidemia Father    • Hypertension Father    • Stroke Brother    • Heart attack Maternal Grandfather    • Heart disease Maternal Grandfather    • Heart disease Paternal Grandfather    • Heart attack Paternal Grandfather    • Hyperlipidemia Brother    • Alcohol abuse Other    • Diabetes Other    • Hypertension Other    • Stroke Other    • Colon cancer Neg Hx    • Cirrhosis Neg Hx    • Liver cancer Neg Hx    • Liver disease Neg Hx        Prior to Admission  Medications:  Medications Prior to Admission   Medication Sig Dispense Refill Last Dose   • azaTHIOprine (IMURAN) 50 MG tablet Take 2 tablets by mouth Daily. 60 tablet 5 8/1/2021 at 0900   • cetirizine (zyrTEC) 10 MG tablet Take 10 mg by mouth Daily.   8/1/2021 at 0900   • ferrous sulfate 325 (65 FE) MG tablet Take 1 tablet by mouth Daily With Breakfast. 30 tablet 3 8/1/2021 at 0900   • mesalamine (APRISO) 0.375 g 24 hr capsule Take 4 capsules by mouth Daily. 120 capsule 5 8/1/2021 at 0900   • Probiotic Product (PROBIOTIC DAILY PO) Take  by mouth Daily.   8/1/2021 at 0900   • ondansetron ODT (ZOFRAN-ODT) 4 MG disintegrating tablet Place 1 tablet on the tongue Every 8 (Eight) Hours As Needed for Nausea or Vomiting. 42 tablet 2 More than a month at Unknown time       Allergies:  Allergies   Allergen Reactions   • Augmentin [Amoxicillin-Pot Clavulanate] Diarrhea and GI Intolerance        Vitals: Temp:  [98.7 °F (37.1 °C)] 98.7 °F (37.1 °C)  Heart Rate:  [79] 79  Resp:  [20] 20  BP: (122)/(78) 122/78    Review Of Systems:  Constitutional:  Negative for chills, fever, and unexpected weight change.  Respiratory:  Negative for cough, chest tightness, shortness of breath, and wheezing.  Cardiovascular:  Negative for chest pain, palpitations, and leg swelling.  Gastrointestinal:  Negative for abdominal distention, abdominal pain, Nausea, vomiting.  Neurological:  Negative for Weakness, numbness, and headaches.     Physical Exam:    General Appearance:  Alert, cooperative, in no acute distress.   Lungs:   Clear to auscultation, respirations regular, even and                 unlabored.   Heart:  Regular rhythm and normal rate.   Abdomen:   Normal bowel sounds, no masses, no organomegaly. Soft, non-tender, non-distended   Neurologic: Alert and oriented x 3. Moves all four limbs equally       Plan: ESOPHAGOGASTRODUODENOSCOPY WITH BIOPSY CPT CODE: 86779 (N/A)     Susan Brizuela MD  8/2/2021

## 2021-08-06 LAB
LAB AP CASE REPORT: NORMAL
PATH REPORT.FINAL DX SPEC: NORMAL

## 2021-08-13 DIAGNOSIS — K51.00 ULCERATIVE PANCOLITIS (HCC): ICD-10-CM

## 2021-08-16 RX ORDER — AZATHIOPRINE 50 MG/1
100 TABLET ORAL DAILY
Qty: 60 TABLET | Refills: 5 | Status: SHIPPED | OUTPATIENT
Start: 2021-08-16 | End: 2022-03-09 | Stop reason: SDUPTHER

## 2021-09-13 ENCOUNTER — OFFICE VISIT (OUTPATIENT)
Dept: GASTROENTEROLOGY | Facility: CLINIC | Age: 46
End: 2021-09-13

## 2021-09-13 VITALS
WEIGHT: 142 LBS | HEIGHT: 64 IN | TEMPERATURE: 96.2 F | RESPIRATION RATE: 16 BRPM | BODY MASS INDEX: 24.24 KG/M2 | DIASTOLIC BLOOD PRESSURE: 69 MMHG | SYSTOLIC BLOOD PRESSURE: 127 MMHG | HEART RATE: 103 BPM

## 2021-09-13 DIAGNOSIS — K51.00 ULCERATIVE PANCOLITIS (HCC): Primary | ICD-10-CM

## 2021-09-13 DIAGNOSIS — R11.0 NAUSEA: ICD-10-CM

## 2021-09-13 DIAGNOSIS — D50.9 IRON DEFICIENCY ANEMIA, UNSPECIFIED IRON DEFICIENCY ANEMIA TYPE: ICD-10-CM

## 2021-09-13 PROCEDURE — 83993 ASSAY FOR CALPROTECTIN FECAL: CPT | Performed by: PHYSICIAN ASSISTANT

## 2021-09-13 PROCEDURE — 99214 OFFICE O/P EST MOD 30 MIN: CPT | Performed by: PHYSICIAN ASSISTANT

## 2021-09-13 RX ORDER — BISACODYL 5 MG/1
TABLET, DELAYED RELEASE ORAL
Qty: 4 TABLET | Refills: 0 | Status: SHIPPED | OUTPATIENT
Start: 2021-09-13 | End: 2021-09-17 | Stop reason: HOSPADM

## 2021-09-13 RX ORDER — SODIUM CHLORIDE 9 MG/ML
30 INJECTION, SOLUTION INTRAVENOUS CONTINUOUS PRN
Status: CANCELLED | OUTPATIENT
Start: 2021-09-13

## 2021-09-13 NOTE — PROGRESS NOTES
"     Follow Up Note     Date: 2021   Patient Name: Stacy Guardado  MRN: [id number]  : 1975     Primary Care Provider: Shayna Swartz PA     Chief Complaint   Patient presents with   • recent EGD   • Ulcerative Colitis     History of present illness:   2021  Stacy Guardado is a 46 y.o. female who is here today for follow up regarding recent EGD and Ulcerative Colitis.    She continues to have 5-6 loose stools daily with fecal urgency. Nausea is still present daily. She has fatigue. Still taking iron supplement daily. Taking Apriso 4 capsules each morning plus imuran 50 mg 2 tablets daily for treatment of UC. She had EGD recently and would like to discuss those results. Rectal bleeding has been intermittent over the past several months but recently has been present for weeks. She has bright red blood per rectum and feels \"raw\" in the rectal area.     Interval History:  2021  Feeling dramatically better overall. Still taking probiotic and iron supplement daily. Still taking Apriso 4 capsules in the morning plus Imuran 50 mg 2 tablets daily for UC. She took antibiotics (vancomycin) as prescribed for treatment of c diff found on last stool testing. She had improvement in foul smelling stools and abdominal pain about half way through treatment with vancomycin. She still has intermittent nausea, occurring several days per week, requests zofran to take as needed which has helped in the past.  Takes her medications with food. Reports still having 5-6 loose stools per day which mostly occur after meals. No rectal bleeding. Last labs in 2021.      3/10/2021  Since her last visit, she reports an overall improvement in her illness. She feels better but still has 5 stools per day which are described as small soft pieces of stool or sometimes loose in consistency. She has intermittent bright red rectal bleeding with 1-2 stools intermittently. Blood is described as mixed in with stool but " not large in amount. This occurs about 1 time per week over the past 1.5 months. She is only taking Apriso 4 caps in the morning daily, never started azathioprine as directed because she was confused as to why she should take it and worried about the side effects. Complains of current fatigue and cold intolerance. Is not currently taking iron supplements but has a history of TOMI. She has rare left sided flank pain which is mild to moderate when it occurs. Her new diagnosis of ulcerative colitis was made after colonoscopy was completed in 11/2020.      11/10/2020  She states that she noted to have red blood in stool three weeks ago and that got worse with loose stool.She also develeloped LLQ abdominal pain. Loose stool 5-7 times daily. Deny any fever chills. Patient was in emergency room 2 weeks ago with complaints of diarrhea abdominal pain and blood in the stool.  She had basic lab work done which revealed borderline leukocytosis.  She deny any prior constipation. She also had a CT scan of the abdomen pelvis done which revealed thickening in the descending colon the sigmoid suggesting colitis. She was given Augmentin for ten days.    Weight is stable. Pt denies nausea vomiting or odynophagia or dysphagia. There is no history of acid reflux. There is no history of anemia. No prior history of EGD or colonoscopy. No family history of colon cancer or any GI malignancy, or IBD. No history of any abdominal surgery. Denies alcohol abuse or cigarette smoking.     Subjective     Past Medical History:   Diagnosis Date   • Abnormal ECG    • Bipolar affective (CMS/HCC)    • COPD (chronic obstructive pulmonary disease) (CMS/HCC)    • Palpitations    • Tattoos 11/12/2020    x's 3   • Tattoos      Past Surgical History:   Procedure Laterality Date   • COLONOSCOPY N/A 11/17/2020    Procedure: COLONOSCOPY WITH BIOPSIES;  Surgeon: Susan Brizuela MD;  Location: [facility] ENDOSCOPY;  Service: Gastroenterology;  Laterality: N/A;    • ENDOSCOPY N/A 2021    Procedure: ESOPHAGOGASTRODUODENOSCOPY WITH BIOPSY ;  Surgeon: uSsan Brizuela MD;  Location: [facility] ENDOSCOPY;  Service: Gastroenterology;  Laterality: N/A;   • WISDOM TOOTH EXTRACTION       Family History   Problem Relation Age of Onset   • Hyperlipidemia Mother    • Hyperlipidemia Father    • Hypertension Father    • Stroke Brother    • Heart attack Maternal Grandfather    • Heart disease Maternal Grandfather    • Heart disease Paternal Grandfather    • Heart attack Paternal Grandfather    • Hyperlipidemia Brother    • Alcohol abuse Other    • Diabetes Other    • Hypertension Other    • Stroke Other    • Colon cancer Neg Hx    • Cirrhosis Neg Hx    • Liver cancer Neg Hx    • Liver disease Neg Hx      Social History     Socioeconomic History   • Marital status:      Spouse name: Not on file   • Number of children: Not on file   • Years of education: Not on file   • Highest education level: Not on file   Tobacco Use   • Smoking status: Former Smoker     Packs/day: 1.00     Types: Cigarettes     Quit date: 2019     Years since quittin.3   • Smokeless tobacco: Never Used   Vaping Use   • Vaping Use: Some days   • Last attempt to quit: 3/18/2020   • Substances: Nicotine, Flavoring   • Devices: Pre-filled or refillable cartridge, Refillable tank   Substance and Sexual Activity   • Alcohol use: Yes     Comment: socially   • Drug use: No   • Sexual activity: Defer     Current Outpatient Medications:   •  azaTHIOprine (IMURAN) 50 MG tablet, Take 2 tablets by mouth Daily., Disp: 60 tablet, Rfl: 5  •  cetirizine (zyrTEC) 10 MG tablet, Take 10 mg by mouth Daily., Disp: , Rfl:   •  ferrous sulfate 325 (65 FE) MG tablet, Take 1 tablet by mouth Daily With Breakfast., Disp: 30 tablet, Rfl: 3  •  mesalamine (APRISO) 0.375 g 24 hr capsule, Take 4 capsules by mouth Daily., Disp: 120 capsule, Rfl: 5  •  ondansetron ODT (ZOFRAN-ODT) 4 MG disintegrating tablet, Place 1 tablet on the  tongue Every 8 (Eight) Hours As Needed for Nausea or Vomiting., Disp: 42 tablet, Rfl: 2  •  Probiotic Product (PROBIOTIC DAILY PO), Take  by mouth Daily., Disp: , Rfl:     Allergies   Allergen Reactions   • Augmentin [Amoxicillin-Pot Clavulanate] Diarrhea and GI Intolerance     The following portions of the patient's history were reviewed and updated as appropriate: allergies, current medications, past family history, past medical history, past social history, past surgical history and problem list.    Objective     Physical Exam  Vitals reviewed.   Constitutional:       General: She is not in acute distress.     Appearance: Normal appearance. She is well-developed. She is not ill-appearing or diaphoretic.   HENT:      Head: Normocephalic and atraumatic.      Right Ear: External ear normal.      Left Ear: External ear normal.      Nose: Nose normal.      Mouth/Throat:      Comments: Wearing a mask  Eyes:      General: No scleral icterus.        Right eye: No discharge.         Left eye: No discharge.      Conjunctiva/sclera: Conjunctivae normal.   Neck:      Vascular: No JVD.   Cardiovascular:      Rate and Rhythm: Normal rate and regular rhythm.      Heart sounds: Normal heart sounds. No murmur heard.   No friction rub. No gallop.    Pulmonary:      Effort: Pulmonary effort is normal. No respiratory distress.      Breath sounds: Normal breath sounds. No wheezing or rales.   Chest:      Chest wall: No tenderness.   Abdominal:      General: Bowel sounds are normal. There is no distension.      Palpations: Abdomen is soft. There is no mass.      Tenderness: There is abdominal tenderness (generalized, mild). There is no guarding.   Musculoskeletal:         General: No deformity. Normal range of motion.      Cervical back: Normal range of motion.   Skin:     General: Skin is warm and dry.      Findings: No erythema or rash.   Neurological:      Mental Status: She is alert and oriented to person, place, and time.       "Coordination: Coordination normal.   Psychiatric:         Mood and Affect: Mood normal.         Behavior: Behavior normal.         Thought Content: Thought content normal.         Judgment: Judgment normal.       Vitals:    09/13/21 1130   BP: 127/69   Pulse: 103   Resp: 16   Temp: 96.2 °F (35.7 °C)   Weight: 64.4 kg (142 lb)   Height: 162.6 cm (64\")     Results Review:   I reviewed the patient's new clinical results.    Lab on 07/27/2021   Component Date Value Ref Range Status   • C-Reactive Protein 07/27/2021 0.69* 0.00 - 0.50 mg/dL Final   • WBC 07/27/2021 7.33  3.40 - 10.80 10*3/mm3 Final   • RBC 07/27/2021 3.65* 3.77 - 5.28 10*6/mm3 Final   • Hemoglobin 07/27/2021 10.6* 12.0 - 15.9 g/dL Final   • Hematocrit 07/27/2021 32.8* 34.0 - 46.6 % Final   • MCV 07/27/2021 89.9  79.0 - 97.0 fL Final   • MCH 07/27/2021 29.0  26.6 - 33.0 pg Final   • MCHC 07/27/2021 32.3  31.5 - 35.7 g/dL Final   • RDW 07/27/2021 16.7* 12.3 - 15.4 % Final   • RDW-SD 07/27/2021 54.0  37.0 - 54.0 fl Final   • MPV 07/27/2021 10.8  6.0 - 12.0 fL Final   • Platelets 07/27/2021 487* 140 - 450 10*3/mm3 Final   • Neutrophil % 07/27/2021 72.5  42.7 - 76.0 % Final   • Lymphocyte % 07/27/2021 16.8* 19.6 - 45.3 % Final   • Monocyte % 07/27/2021 8.3  5.0 - 12.0 % Final   • Eosinophil % 07/27/2021 1.5  0.3 - 6.2 % Final   • Basophil % 07/27/2021 0.5  0.0 - 1.5 % Final   • Immature Grans % 07/27/2021 0.4  0.0 - 0.5 % Final   • Neutrophils, Absolute 07/27/2021 5.31  1.70 - 7.00 10*3/mm3 Final   • Lymphocytes, Absolute 07/27/2021 1.23  0.70 - 3.10 10*3/mm3 Final   • Monocytes, Absolute 07/27/2021 0.61  0.10 - 0.90 10*3/mm3 Final   • Eosinophils, Absolute 07/27/2021 0.11  0.00 - 0.40 10*3/mm3 Final   • Basophils, Absolute 07/27/2021 0.04  0.00 - 0.20 10*3/mm3 Final   • Immature Grans, Absolute 07/27/2021 0.03  0.00 - 0.05 10*3/mm3 Final   • nRBC 07/27/2021 0.0  0.0 - 0.2 /100 WBC Final   • Glucose 07/27/2021 81  65 - 99 mg/dL Final   • BUN 07/27/2021 5* 6 - " 20 mg/dL Final   • Creatinine 07/27/2021 0.68  0.57 - 1.00 mg/dL Final   • Sodium 07/27/2021 141  136 - 145 mmol/L Final   • Potassium 07/27/2021 4.1  3.5 - 5.2 mmol/L Final   • Chloride 07/27/2021 104  98 - 107 mmol/L Final   • CO2 07/27/2021 26.0  22.0 - 29.0 mmol/L Final   • Calcium 07/27/2021 9.6  8.6 - 10.5 mg/dL Final   • Total Protein 07/27/2021 7.4  6.0 - 8.5 g/dL Final   • Albumin 07/27/2021 4.50  3.50 - 5.20 g/dL Final   • ALT (SGPT) 07/27/2021 9  1 - 33 U/L Final   • AST (SGOT) 07/27/2021 16  1 - 32 U/L Final   • Alkaline Phosphatase 07/27/2021 96  39 - 117 U/L Final   • Total Bilirubin 07/27/2021 0.2  0.0 - 1.2 mg/dL Final   • eGFR Non  Amer 07/27/2021 94  >60 mL/min/1.73 Final   • Globulin 07/27/2021 2.9  gm/dL Final   • A/G Ratio 07/27/2021 1.6  g/dL Final   • BUN/Creatinine Ratio 07/27/2021 7.4  7.0 - 25.0 Final   • Anion Gap 07/27/2021 11.0  5.0 - 15.0 mmol/L Final   • Iron 07/27/2021 60  37 - 145 mcg/dL Final   • Iron Saturation 07/27/2021 18* 20 - 50 % Final   • Transferrin 07/27/2021 228  200 - 360 mg/dL Final   • TIBC 07/27/2021 340  298 - 536 mcg/dL Final      CT Abdomen Pelvis With Contrast  Result Date: 12/15/2020  Acute colitis, most severe involving the ascending colon.  Mild free fluid.  Recommend appropriate close follow-up.     Colonoscopy was completed on 11/17/2020 by Dr. Brizuela:  - Hemorrhoids found on perianal exam.  - Pancolitis ulcerative colitis. Inflammation was found from the rectum to the hepatic flexure. This was severe,  new compared to previous examinations. Biopsied. Dispersed ulceration and colitis identified proximal to HF upt  to ceacum  - Separate biopsy was sent to rule out CMV ulceration.  - Non-bleeding external hemorrhoids.  - The examined portion of the ileum was normal. Biopsied.  Pathology showed ileal mucosa with no significant histopathologic abnormality of the terminal ileum, cecal biopsy showed mildly active colitis without dysplasia, ascending colon  polyps he showed mildly active colitis, transverse colon biopsy showed moderately active colitis, transverse colon biopsies showed severely active colitis with ulceration, descending colon biopsy showed moderately active colitis with ulceration, avoid colon biopsy showed moderately active colitis, rectal biopsy showed mildly active colitis with ulceration.    EGD was completed by Dr. Brizuela on 8/2/2021:  - The oropharynx was normal.  - The Z-line was regular and was found 36 cm from the incisors.  - No gross lesions were noted in the entire esophagus.  - Localized moderate inflammation characterized by erosions, erythema, friability and granularity was found on the  posterior wall of the stomach. Biopsies were taken with a cold forceps for histology.  - The duodenal bulb, first portion of the duodenum, second portion of the duodenum and third portion of the duodenum  were normal. Biopsies for histology were taken with a cold forceps for evaluation of celiac disease.  Pathology showed benign duodenal mucosa with no diagnostic abnormality, reactive gastropathy with hemosiderin laden pigmented macrophages of the antrum and body.    Assessment / Plan      Addendum on 10/24/2021.  Luu score was scored after the endoscopy and endoscopic findings.  Total Luu score for ulcerative colitis severity was 9 ( stool pattern 3 points, rectal bleeding 0 points, endoscopic findings 3 points,  global assessment 3 points)      1. Ulcerative pancolitis   09/13/21  She continues to have 5-6 loose stools daily. It does not seem that her ulcerative colitis is controlled with current regimen of apriso 4 capsules daily plus imuran 50 mg 2 tablets daily. Will likely need step up therapy with biologics. She has already had negative TB screen and hepatitis screening. She will repeat fecal calprotectin now. Colonoscopy will be arranged urgently so that we can change therapy to better manage her IBD. Her last colonoscopy showed severe  ulcerative colitis from rectum to hepatic flexure in 11/2020.   - Calprotectin, Fecal - Stool, Per Rectum; Future    She will have a colonoscopy performed with monitored anesthesia care. The indications, technique, alternatives and potential risk and complications were discussed with the patient including but not limited to bleeding, bowel perforations, missing lesions and anesthetic complications. The patient understands and wishes to proceed with the procedure and has given their verbal consent. Written patient education information was given to the patient. She should follow up in the office after this procedure to discuss the results and further recommendations can be made at that time. The patient will call if they have further questions before procedure.  - sodium chloride 0.9 % infusion  - Case Request  - magnesium citrate plus dulcolax prep    7/27/2021  She is feeling better now than she has in months. She has continued medications for treatment of UC as directed. Will continue apriso 4 capsules every morning plus azathioprine 50 mg 2 tablets PO daily. She already had COVID injection (sergio and sergio) Reviewed previous lab results with her in detail today. CRP was significantly elevated but she also had positive C diff at that time. Stool frequency is still more than preferred with 5 stools daily which are loose. No current rectal bleeding and no abdominal pain. Labs today including CBC, CMP, CRP and iron profile.     6/9/2021  Her symptoms of the ulcerative colitis are currently not controlled. She is still having 7 days stools daily, rectal bleeding and left-sided abdominal pain. She has been taking azathioprine 50 mg 2 tablets once daily plus mesalamine (Apriso) 4 capsules once daily exactly as directed. She will complete lab testing today including CBC, CMP, iron profile and C-reactive protein. She will also have stool testing to rule out any infection including C. difficile and GI panel. Stool  calprotectin will gauge bowel inflammation. Her previous calprotectin was elevated at 384 prior to starting the azathioprine.  She may need step up in therapy for treatment of her UC.     3/10/2021  She is feeling better with minimal abdominal pain but still having loose stools and intermittent rectal bleeding. She is taking mesalamine (Apriso) but did not start previously recommended azathioprine (Imuran). For now, I have asked her to complete basic labs as well as iron profile, CRP, B12, vit D and calprotectin stool. Results will be reviewed and depending on those results, may hold off on starting Imuran due to patient preference.   She has already had screening labs needed before starting immunosuppressive therapy.   Reminded her that it is recommended that she get covid vaccination.      12/9/2020   she had a colonoscopy done on 11/17/2020 which revealed extensive diffuse colitis involving the colon up to the hepatic flexure from the rectum.  Dispersed inflammation noted from hyperflexion to the cecum.  Anaspaz normal.  Examined terminal ileum for about 15 cm from IC valve was normal. Pathology revealed acute severe colitis with crypt abscess.  Patient had chronic diarrhea more than 2 months. These findings are more in favor of ulcerative colitis. Recent TB Gold test was negative.  Chronic hepatitis panel was negative for any chronic hep C or hep B infection. She is currently on a tapering dose of steroids. She would definitely benefit from Biologics however at this time patient's insurance does not cover Remicade or the Humira or newer Biologics. We will start her on Apriso along with the Imuran and see how she does with that regimen.   If no significant improvement with that for the next 6 months to 12 months we will consider Biologics and get a prior authorization  Started on Apriso 0.375 g p.o. 4 capsules daily. We will add Imuran 100 mg p.o. daily when once a TPMT genetics available  She is immune to  hepatitis A but not to hepatitis B.  She needs a hep B vaccine as outpatient with the booster  Advised flu vaccine and COVID-19 vaccine     11/10/2020  Acute episode of bloody diarrhea, which has progressed now to chronic.  Symptoms more suggestive infective colitis however IBD cannot be ruled out. She states that she finished 10-day course of Augmentin given at the ED but she still has ongoing bloody diarrhea with minimal left lower quadrant abdominal pain. Her basic lab work done in the emergency room 2 weeks ago did not reveal any significant abnormality except borderline leukocytosis.  CT of the abdomen pelvis done revealed a thickened descending colon and the sigmoid colon suggesting colitis.  No family history of any IBD. Given her persistent symptoms we will schedule her for colonoscopy for further evaluation  We will also get a stool for C. difficile and the stool for GI panel    2. Nausea  09/13/21  Nausea still present, seems likely side effect of oral mesalamine. EGD results discussed with her in detail, she did have erythema of the posterior wall of stomach. Pathology was benign.     7/27/2021   She has continued to have nausea despite clinical improvement in her UC. Can take zofran as needed for relief of nausea for now. Recommend to continue daily probiotic for now. Will arrange EGD.     3. Iron deficiency anemia, unspecified iron deficiency anemia type  09/13/21  Rectal bleeding has been intermittent, did stop for a few months, still taking oral iron therapy. Iron normal on last labs but at lower limit. TOMI likely related to her continued IBD.     7/27/2021  Continues to take iron supplement daily as directed, will re-check iron today. She is no longer having rectal bleeding. Iron was previously 22.        Prior history:  History of c difficile  7/27/2021  She was negative for C diff during hospitalization and initial diagnosis of UC. She has positive C diff stool test in June 2021 and treated with  vancomycin. She had relief from foul smelling diarrhea within a few days of starting antibiotics. Started probiotic daily after treatment completed. Still having 5 loose stools daily.         Follow Up:   Return for follow up after procedure to discuss results.      Leslie Dalton PA-C  [facility]  9/13/2021  16:05 EDT    Please note that portions of this note may have been completed with a voice recognition program. Efforts were made to edit the dictations, but occasionally words are mistranscribed.

## 2021-09-16 RX ORDER — .ALPHA.-TOCOPHEROL ACETATE, DL-, ASCORBIC ACID, CYANOCOBALAMIN, SODIUM FLUORIDE, FOLIC ACID, NIACIN, PYRIDOXINE, RIBOFLAVIN, THIAMINE, VITAMIN A, AND VITAMIN D 15; 60; 4.5; 1; .3; 13.5; 1.05; 1.2; 1.05; 2500; 4 [IU]/1; MG/1; UG/1; MG/1; MG/1; MG/1; MG/1; MG/1; MG/1; [IU]/1; [IU]/1
1 TABLET, CHEWABLE ORAL DAILY
COMMUNITY

## 2021-09-16 RX ORDER — SODIUM FLUORIDE 0.1 MG/ML
RINSE ORAL
COMMUNITY
End: 2022-03-14

## 2021-09-17 ENCOUNTER — TELEPHONE (OUTPATIENT)
Dept: GASTROENTEROLOGY | Facility: CLINIC | Age: 46
End: 2021-09-17

## 2021-09-17 ENCOUNTER — ANESTHESIA (OUTPATIENT)
Dept: GASTROENTEROLOGY | Facility: HOSPITAL | Age: 46
End: 2021-09-17

## 2021-09-17 ENCOUNTER — ANESTHESIA EVENT (OUTPATIENT)
Dept: GASTROENTEROLOGY | Facility: HOSPITAL | Age: 46
End: 2021-09-17

## 2021-09-17 ENCOUNTER — HOSPITAL ENCOUNTER (OUTPATIENT)
Facility: HOSPITAL | Age: 46
Setting detail: HOSPITAL OUTPATIENT SURGERY
Discharge: HOME | End: 2021-09-17
Attending: INTERNAL MEDICINE | Admitting: INTERNAL MEDICINE

## 2021-09-17 VITALS
DIASTOLIC BLOOD PRESSURE: 58 MMHG | TEMPERATURE: 97.6 F | SYSTOLIC BLOOD PRESSURE: 109 MMHG | BODY MASS INDEX: 23.9 KG/M2 | HEART RATE: 101 BPM | HEIGHT: 64 IN | RESPIRATION RATE: 18 BRPM | OXYGEN SATURATION: 98 % | WEIGHT: 140 LBS

## 2021-09-17 DIAGNOSIS — K51.00 ULCERATIVE PANCOLITIS (HCC): Primary | ICD-10-CM

## 2021-09-17 DIAGNOSIS — K51.00 ULCERATIVE PANCOLITIS (HCC): ICD-10-CM

## 2021-09-17 DIAGNOSIS — D50.9 IRON DEFICIENCY ANEMIA, UNSPECIFIED IRON DEFICIENCY ANEMIA TYPE: ICD-10-CM

## 2021-09-17 DIAGNOSIS — K51.011 ULCERATIVE PANCOLITIS WITH RECTAL BLEEDING (HCC): Primary | ICD-10-CM

## 2021-09-17 LAB
C DIFF GDH STL QL: NEGATIVE
CALPROTECTIN STL-MCNT: 1068 UG/G (ref 0–120)

## 2021-09-17 PROCEDURE — 45390 COLONOSCOPY W/RESECTION: CPT | Performed by: INTERNAL MEDICINE

## 2021-09-17 PROCEDURE — C1889 IMPLANT/INSERT DEVICE, NOC: HCPCS | Performed by: INTERNAL MEDICINE

## 2021-09-17 PROCEDURE — 45380 COLONOSCOPY AND BIOPSY: CPT | Performed by: INTERNAL MEDICINE

## 2021-09-17 PROCEDURE — 87324 CLOSTRIDIUM AG IA: CPT | Performed by: INTERNAL MEDICINE

## 2021-09-17 PROCEDURE — 87449 NOS EACH ORGANISM AG IA: CPT | Performed by: INTERNAL MEDICINE

## 2021-09-17 PROCEDURE — 45385 COLONOSCOPY W/LESION REMOVAL: CPT | Performed by: INTERNAL MEDICINE

## 2021-09-17 PROCEDURE — 25010000002 PROPOFOL 200 MG/20ML EMULSION: Performed by: NURSE ANESTHETIST, CERTIFIED REGISTERED

## 2021-09-17 DEVICE — DEV CLIP ENDO RESOLUTION360 CONTRL ROT 235CM: Type: IMPLANTABLE DEVICE | Site: COLON | Status: FUNCTIONAL

## 2021-09-17 RX ORDER — ACETAMINOPHEN 325 MG/1
650 TABLET ORAL ONCE
Status: CANCELLED | OUTPATIENT
Start: 2021-10-05

## 2021-09-17 RX ORDER — LIDOCAINE HYDROCHLORIDE 20 MG/ML
INJECTION, SOLUTION INTRAVENOUS AS NEEDED
Status: DISCONTINUED | OUTPATIENT
Start: 2021-09-17 | End: 2021-09-17 | Stop reason: SURG

## 2021-09-17 RX ORDER — DIPHENHYDRAMINE HCL 25 MG
25 CAPSULE ORAL ONCE
Status: CANCELLED | OUTPATIENT
Start: 2021-10-05

## 2021-09-17 RX ORDER — SODIUM CHLORIDE 9 MG/ML
250 INJECTION, SOLUTION INTRAVENOUS ONCE
Status: CANCELLED | OUTPATIENT
Start: 2021-10-05

## 2021-09-17 RX ORDER — PROPOFOL 10 MG/ML
INJECTION, EMULSION INTRAVENOUS AS NEEDED
Status: DISCONTINUED | OUTPATIENT
Start: 2021-09-17 | End: 2021-09-17 | Stop reason: SURG

## 2021-09-17 RX ORDER — PREDNISONE 1 MG/1
TABLET ORAL
Qty: 96 TABLET | Refills: 0 | Status: SHIPPED | OUTPATIENT
Start: 2021-09-17 | End: 2021-11-18

## 2021-09-17 RX ORDER — SODIUM CHLORIDE 9 MG/ML
30 INJECTION, SOLUTION INTRAVENOUS CONTINUOUS PRN
Status: DISCONTINUED | OUTPATIENT
Start: 2021-09-17 | End: 2021-09-17 | Stop reason: HOSPADM

## 2021-09-17 RX ADMIN — PROPOFOL 50 MG: 10 INJECTION, EMULSION INTRAVENOUS at 10:03

## 2021-09-17 RX ADMIN — PROPOFOL 100 MG: 10 INJECTION, EMULSION INTRAVENOUS at 09:47

## 2021-09-17 RX ADMIN — LIDOCAINE HYDROCHLORIDE 60 MG: 20 INJECTION, SOLUTION INTRAVENOUS at 09:47

## 2021-09-17 RX ADMIN — PROPOFOL 50 MG: 10 INJECTION, EMULSION INTRAVENOUS at 09:57

## 2021-09-17 RX ADMIN — PROPOFOL 20 MG: 10 INJECTION, EMULSION INTRAVENOUS at 10:26

## 2021-09-17 RX ADMIN — SODIUM CHLORIDE: 9 INJECTION, SOLUTION INTRAVENOUS at 09:41

## 2021-09-17 RX ADMIN — PROPOFOL 50 MG: 10 INJECTION, EMULSION INTRAVENOUS at 10:18

## 2021-09-17 RX ADMIN — PROPOFOL 50 MG: 10 INJECTION, EMULSION INTRAVENOUS at 10:10

## 2021-09-17 RX ADMIN — PROPOFOL 50 MG: 10 INJECTION, EMULSION INTRAVENOUS at 09:50

## 2021-09-17 NOTE — TELEPHONE ENCOUNTER
Spoke with Dr. Brizuela regarding pt's colonoscopy today. She needs prednisone therapy which I have sent, continue azathioprine and mesalamine. Will place order for remicade so that we can get approval for infusion.     I called and spoke with her, she understands plan. Has f/u with Dr. Brizuela in 2 weeks.

## 2021-09-17 NOTE — ANESTHESIA POSTPROCEDURE EVALUATION
Patient: Stacy Guardado    Procedure Summary     Date: 09/17/21 Room / Location: [facility] ENDOSCOPY 2 / [facility] ENDOSCOPY    Anesthesia Start: 0941 Anesthesia Stop: 1031    Procedure: COLONOSCOPY with biopsy,  polypectomy, endoscopic with hot snare polypectomy mucosal resection with injection of normal saline soft colaguation with clip placement x 3 (N/A Anus) Diagnosis:       Ulcerative pancolitis (CMS/HCC)      Iron deficiency anemia, unspecified iron deficiency anemia type      (Ulcerative pancolitis (CMS/HCC) [K51.00])      (Iron deficiency anemia, unspecified iron deficiency anemia type [D50.9])    Surgeons: Susan Brizuela MD Provider: Binh Roche CRNA    Anesthesia Type: MAC ASA Status: 3          Anesthesia Type: MAC    Vitals  Vitals Value Taken Time   /58 09/17/21 1106   Temp 97.6 °F (36.4 °C) 09/17/21 1044   Pulse 101 09/17/21 1106   Resp 18 09/17/21 1106   SpO2 98 % 09/17/21 1106           Post Anesthesia Care and Evaluation    Patient location during evaluation: PHASE II  Patient participation: complete - patient participated  Level of consciousness: awake and sleepy but conscious  Pain management: adequate  Airway patency: patent  Anesthetic complications: No anesthetic complications  PONV Status: none  Cardiovascular status: acceptable and hemodynamically stable  Respiratory status: acceptable, room air, nonlabored ventilation and spontaneous ventilation  Hydration status: acceptable

## 2021-09-17 NOTE — DISCHARGE INSTRUCTIONS
- Discharge patient to home (ambulatory).   - Low fiber diet.   - Continue present medications.   - Await pathology results.   - No NSAIDS  - Return to my office in 2 weeks  - Will consider biologics          To assist you in voiding:  Drink plenty of fluids  Listen to running water while attempting to void.    If you are unable to urinate and you have an uncomfortable urge to void or it has been   6 hours since you were discharged, return to the Emergency Room      No pushing, pulling, tugging,  heavy lifting, or strenuous activity.  No major decision making, driving, or drinking alcoholic beverages for 24 hours. ( due to the medications you have  received)  Always use good hand hygiene/washing techniques.  NO driving while taking pain medications.

## 2021-09-17 NOTE — ANESTHESIA PREPROCEDURE EVALUATION
Anesthesia Evaluation     Patient summary reviewed and Nursing notes reviewed   no history of anesthetic complications:  NPO Solid Status: > 8 hours  NPO Liquid Status: > 8 hours           Airway   Mallampati: II  TM distance: >3 FB  Neck ROM: full  No difficulty expected  Dental - normal exam     Pulmonary - normal exam   (+) a smoker Current, COPD,   Cardiovascular - normal exam    ECG reviewed    (+) dysrhythmias PVC, Tachycardia,     ROS comment: Sinus tachycardia with short AK and short QT    Neuro/Psych  (+) psychiatric history Anxiety, Depression and Bipolar,     GI/Hepatic/Renal/Endo - negative ROS     Musculoskeletal (-) negative ROS    Abdominal  - normal exam   Substance History - negative use     OB/GYN negative ob/gyn ROS         Other        ROS/Med Hx Other: Palpitations  Anxiety  Depression  Tobacco abuse  Facial droop  Sepsis without acute organ dysfunction (CMS/HCC)  Former smoker  Hypomagnesemia  Ulcerative pancolitis (CMS/HCC)  Nausea  Iron deficiency anemia                        Anesthesia Plan    ASA 3     MAC   (Patient advised that intravenous sedation would be utilized as primary anesthetic technique. Every effort will be made to make sure patient is comfortable. Patient advised that they may experience recall of events of the procedure. Patient verbalized understanding and agreed to plan. )  intravenous induction     Anesthetic plan, all risks, benefits, and alternatives have been provided, discussed and informed consent has been obtained with: patient.    Plan discussed with CRNA.

## 2021-09-17 NOTE — H&P
[facility]  HISTORY AND PHYSICAL    Patient Name: Stacy Guardado  : 1975  MRN: [id number]    Chief Complaint:   For  colonoscopy    History Of Presenting Illness:    H/o panocolitis  with flare    Past Medical History:   Diagnosis Date   • Abnormal ECG     tachycardia   • Bipolar affective (CMS/HCC)    • Diverticulitis    • Multiple body piercings     ears x 5   • Palpitations    • Tattoos 2020    x's 3   • Tattoos    • Ulcerative colitis (CMS/HCC)        Past Surgical History:   Procedure Laterality Date   • COLONOSCOPY N/A 2020    Procedure: COLONOSCOPY WITH BIOPSIES;  Surgeon: Susan Brizuela MD;  Location: [facility] ENDOSCOPY;  Service: Gastroenterology;  Laterality: N/A;   • ENDOSCOPY N/A 2021    Procedure: ESOPHAGOGASTRODUODENOSCOPY WITH BIOPSY ;  Surgeon: Susan Brizuela MD;  Location: [facility] ENDOSCOPY;  Service: Gastroenterology;  Laterality: N/A;   • WISDOM TOOTH EXTRACTION         Social History     Socioeconomic History   • Marital status:      Spouse name: Not on file   • Number of children: Not on file   • Years of education: Not on file   • Highest education level: Not on file   Tobacco Use   • Smoking status: Former Smoker     Packs/day: 1.00     Types: Cigarettes     Quit date: 2019     Years since quittin.3   • Smokeless tobacco: Never Used   Vaping Use   • Vaping Use: Some days   • Last attempt to quit: 3/18/2020   • Substances: Nicotine, Flavoring   • Devices: Pre-filled or refillable cartridge, Refillable tank   Substance and Sexual Activity   • Alcohol use: Yes     Comment: socially   • Drug use: No   • Sexual activity: Defer       Family History   Problem Relation Age of Onset   • Hyperlipidemia Mother    • Hyperlipidemia Father    • Hypertension Father    • Stroke Brother    • Heart attack Maternal Grandfather    • Heart disease Maternal Grandfather    • Heart disease Paternal Grandfather    • Heart attack Paternal  Grandfather    • Hyperlipidemia Brother    • Alcohol abuse Other    • Diabetes Other    • Hypertension Other    • Stroke Other    • Colon cancer Neg Hx    • Cirrhosis Neg Hx    • Liver cancer Neg Hx    • Liver disease Neg Hx        Prior to Admission Medications:  Medications Prior to Admission   Medication Sig Dispense Refill Last Dose   • azaTHIOprine (IMURAN) 50 MG tablet Take 2 tablets by mouth Daily. 60 tablet 5 9/16/2021 at 0900   • bisacodyl (DULCOLAX) 5 MG EC tablet Take as directed per instructions given at office 4 tablet 0 9/17/2021 at Unknown time   • cetirizine (zyrTEC) 10 MG tablet Take 10 mg by mouth Daily.   9/16/2021 at 0900   • ferrous sulfate 325 (65 FE) MG tablet Take 1 tablet by mouth Daily With Breakfast. 30 tablet 3 9/13/2021   • magnesium citrate solution Follow bowel prep instructions given at office (Patient taking differently: Take 296 mL by mouth Take As Directed. 2 bottles) 592 mL 0 9/17/2021 at Unknown time   • mesalamine (APRISO) 0.375 g 24 hr capsule Take 4 capsules by mouth Daily. 120 capsule 5 9/16/2021 at 0900   • ondansetron ODT (ZOFRAN-ODT) 4 MG disintegrating tablet Place 1 tablet on the tongue Every 8 (Eight) Hours As Needed for Nausea or Vomiting. 42 tablet 2 Past Week at Unknown time   • Pediatric Multivitamins-Fl (Multiple Vitamins/Fluoride) 1 MG chewable tablet Chew 1 capsule Daily.   Past Week at Unknown time   • Probiotic Product (Align) chewable tablet Chew.   9/16/2021 at 0900   • Probiotic Product (PROBIOTIC DAILY PO) Take 1 capsule by mouth Daily.   9/16/2021 at 0900       Allergies:  Allergies   Allergen Reactions   • Augmentin [Amoxicillin-Pot Clavulanate] Diarrhea and GI Intolerance        Vitals: Temp:  [97.7 °F (36.5 °C)] 97.7 °F (36.5 °C)  Heart Rate:  [113] 113  Resp:  [16] 16  BP: (120)/(82) 120/82    Review Of Systems:  Constitutional:  Negative for chills, fever, and unexpected weight change.  Respiratory:  Negative for cough, chest tightness, shortness of  breath, and wheezing.  Cardiovascular:  Negative for chest pain, palpitations, and leg swelling.  Gastrointestinal:  Negative for abdominal distention, abdominal pain, Nausea, vomiting.  Neurological:  Negative for Weakness, numbness, and headaches.     Physical Exam:    General Appearance:  Alert, cooperative, in no acute distress.   Lungs:   Clear to auscultation, respirations regular, even and                 unlabored.   Heart:  Regular rhythm and normal rate.   Abdomen:   Normal bowel sounds, no masses, no organomegaly. Soft, non-tender, non-distended   Neurologic: Alert and oriented x 3. Moves all four limbs equally       Plan: COLONOSCOPY CPT CODE: 61869 (N/A)     Susan Brizuela MD  9/17/2021

## 2021-09-23 LAB
LAB AP CASE REPORT: NORMAL
PATH REPORT.FINAL DX SPEC: NORMAL

## 2021-10-01 RX ORDER — ACETAMINOPHEN 325 MG/1
650 TABLET ORAL ONCE
OUTPATIENT
Start: 2021-10-05

## 2021-10-01 RX ORDER — DIPHENHYDRAMINE HCL 25 MG
25 CAPSULE ORAL ONCE
OUTPATIENT
Start: 2021-10-05

## 2021-10-01 RX ORDER — SODIUM CHLORIDE 9 MG/ML
250 INJECTION, SOLUTION INTRAVENOUS ONCE
OUTPATIENT
Start: 2021-10-05

## 2021-10-04 ENCOUNTER — OFFICE VISIT (OUTPATIENT)
Dept: GASTROENTEROLOGY | Facility: CLINIC | Age: 46
End: 2021-10-04

## 2021-10-04 VITALS
DIASTOLIC BLOOD PRESSURE: 78 MMHG | BODY MASS INDEX: 23.73 KG/M2 | SYSTOLIC BLOOD PRESSURE: 120 MMHG | HEIGHT: 64 IN | WEIGHT: 139 LBS | TEMPERATURE: 98.6 F

## 2021-10-04 DIAGNOSIS — D50.0 IRON DEFICIENCY ANEMIA DUE TO CHRONIC BLOOD LOSS: ICD-10-CM

## 2021-10-04 DIAGNOSIS — K51.00 ULCERATIVE PANCOLITIS (HCC): Primary | ICD-10-CM

## 2021-10-04 DIAGNOSIS — D12.6 ADENOMATOUS POLYP OF COLON, UNSPECIFIED PART OF COLON: ICD-10-CM

## 2021-10-04 PROCEDURE — 99214 OFFICE O/P EST MOD 30 MIN: CPT | Performed by: INTERNAL MEDICINE

## 2021-10-04 NOTE — PROGRESS NOTES
Follow Up Note     Date: 10/04/2021   Patient Name: Stacy Guardado  MRN: [id number]  : 1975     Referring Physician: Shayna Swartz PA    Chief Complaint:    Chief Complaint   Patient presents with   • Follow-up   • Ulcerative Colitis       Interval History:   10/4/2021  Stacy Guardado is a 46 y.o. female who is here today for follow up for her ulcerative colitis.  Since she was started on steroid her bowel movements improved she is having anywhere 1-3 soft formed bowel movement daily now without any blood or abdominal pain.  He still has a week of steroid pending.    2020  Stacy Guardado is a 45 y.o. female who is here today for follow up for IBD diarrhea. Her diarrhea improved. 4-5 times small soft stool without blood and 3-4 times soft small stool at night.  She will get occasional cramps diffuse.  She was recently discharged from the hospital after being treated for recurrent diarrhea blood in the stool.  She is currently taking prednisone tapering dose  She had a colonoscopy and she is here to discuss the pathology report and further management..      11/10/2020  Stacy Guardado is a 45 y.o. female who is here today to establish care with Gastroenterology for evaluation of recent diarrhea with blood.   She states that she noted to have red blood in stool three weeks ago and that got worse with loose stool.She also develeloped LLQ abdominal pain. Loose stool 5-7 times daily. Deny any fever chills. Patient was in emergency room 2 weeks ago with complaints of diarrhea abdominal pain and blood in the stool.  She had basic lab work done which revealed borderline leukocytosis.  She deny any prior constipation. She also had a CT scan of the abdomen pelvis done which revealed thickening in the descending colon the sigmoid suggesting colitis. She was given Augmentin for ten days.    Weight is stable. Pt denies nausea vomiting or odynophagia or dysphagia. There is no  history of acid reflux. There is no history of anemia. No prior history of EGD or colonoscopy. No family history of colon cancer or any GI malignancy, or IBD. No history of any abdominal surgery. Denies alcohol abuse or cigarette smoking.     Subjective      Past Medical History:   Past Medical History:   Diagnosis Date   • Abnormal ECG     tachycardia   • Bipolar affective (HCC)    • Diverticulitis    • Multiple body piercings     ears x 5   • Palpitations    • Tattoos 11/12/2020    x's 3   • Tattoos    • Ulcerative colitis (HCC)      Past Surgical History:   Past Surgical History:   Procedure Laterality Date   • COLONOSCOPY N/A 11/17/2020    Procedure: COLONOSCOPY WITH BIOPSIES;  Surgeon: Susan Brizuela MD;  Location: [facility];  Service: Gastroenterology;  Laterality: N/A;   • COLONOSCOPY N/A 9/17/2021    Procedure: COLONOSCOPY with biopsy,  polypectomy, endoscopic with hot snare polypectomy mucosal resection with injection of normal saline soft colaguation with clip placement x 3;  Surgeon: Susan Brizuela MD;  Location: [facility];  Service: Gastroenterology;  Laterality: N/A;   • ENDOSCOPY N/A 8/2/2021    Procedure: ESOPHAGOGASTRODUODENOSCOPY WITH BIOPSY ;  Surgeon: Susan Brizuela MD;  Location: [facility];  Service: Gastroenterology;  Laterality: N/A;   • WISDOM TOOTH EXTRACTION         Family History:   Family History   Problem Relation Age of Onset   • Hyperlipidemia Mother    • Hyperlipidemia Father    • Hypertension Father    • Stroke Brother    • Heart attack Maternal Grandfather    • Heart disease Maternal Grandfather    • Heart disease Paternal Grandfather    • Heart attack Paternal Grandfather    • Hyperlipidemia Brother    • Alcohol abuse Other    • Diabetes Other    • Hypertension Other    • Stroke Other    • Colon cancer Neg Hx    • Cirrhosis Neg Hx    • Liver cancer Neg Hx    • Liver disease Neg Hx        Social History:   Social History     Socioeconomic  History   • Marital status:      Spouse name: Not on file   • Number of children: Not on file   • Years of education: Not on file   • Highest education level: Not on file   Tobacco Use   • Smoking status: Former Smoker     Packs/day: 1.00     Types: Cigarettes     Quit date: 2019     Years since quittin.4   • Smokeless tobacco: Never Used   Vaping Use   • Vaping Use: Some days   • Last attempt to quit: 3/18/2020   • Substances: Nicotine, Flavoring   • Devices: Pre-filled or refillable cartridge, Refillable tank   Substance and Sexual Activity   • Alcohol use: Yes     Comment: socially   • Drug use: No   • Sexual activity: Defer       Medications:     Current Outpatient Medications:   •  azaTHIOprine (IMURAN) 50 MG tablet, Take 2 tablets by mouth Daily., Disp: 60 tablet, Rfl: 5  •  cetirizine (zyrTEC) 10 MG tablet, Take 10 mg by mouth Daily., Disp: , Rfl:   •  ferrous sulfate 325 (65 FE) MG tablet, Take 1 tablet by mouth Daily With Breakfast., Disp: 30 tablet, Rfl: 3  •  mesalamine (APRISO) 0.375 g 24 hr capsule, Take 4 capsules by mouth Daily., Disp: 120 capsule, Rfl: 5  •  ondansetron ODT (ZOFRAN-ODT) 4 MG disintegrating tablet, Place 1 tablet on the tongue Every 8 (Eight) Hours As Needed for Nausea or Vomiting., Disp: 42 tablet, Rfl: 2  •  Pediatric Multivitamins-Fl (Multiple Vitamins/Fluoride) 1 MG chewable tablet, Chew 1 capsule Daily., Disp: , Rfl:   •  predniSONE (DELTASONE) 5 MG tablet, Take 40 mg PO x7 days, 20 mg PO x7 days, 10 mg PO x7 days, 5 mg PO x7 days, 2.5 mg PO x7 days then 2.5 mg every other day until out., Disp: 96 tablet, Rfl: 0  •  Probiotic Product (Align) chewable tablet, Chew., Disp: , Rfl:   •  Probiotic Product (PROBIOTIC DAILY PO), Take 1 capsule by mouth Daily., Disp: , Rfl:     Allergies:   Allergies   Allergen Reactions   • Augmentin [Amoxicillin-Pot Clavulanate] Diarrhea and GI Intolerance       Review of Systems:   Review of Systems   Constitutional: Negative for  "appetite change, fatigue, fever and unexpected weight loss.   HENT: Negative for trouble swallowing.    Gastrointestinal: Negative for abdominal distention, abdominal pain, anal bleeding, blood in stool, constipation, diarrhea, nausea, rectal pain, vomiting, GERD and indigestion.       The following portions of the patient's history were reviewed and updated as appropriate: allergies, current medications, past family history, past medical history, past social history, past surgical history and problem list.    Objective     Physical Exam:  Vital Signs:   Vitals:    10/04/21 1333   BP: 120/78   Temp: 98.6 °F (37 °C)   TempSrc: Infrared   Weight: 63 kg (139 lb)   Height: 162.6 cm (64\")       Physical Exam  Constitutional:       Appearance: Normal appearance.   HENT:      Head: Normocephalic and atraumatic.   Eyes:      Conjunctiva/sclera: Conjunctivae normal.   Abdominal:      General: Abdomen is flat. There is no distension.      Palpations: There is no mass.      Tenderness: There is no abdominal tenderness. There is no guarding or rebound.      Hernia: No hernia is present.   Musculoskeletal:      Cervical back: Normal range of motion and neck supple.   Neurological:      Mental Status: She is alert.         Results Review:   I reviewed the patient's new clinical results.    Admission on 09/17/2021, Discharged on 09/17/2021   Component Date Value Ref Range Status   • C Diff GDH / Toxin 09/17/2021 Negative  Negative Final   • Case Report 09/17/2021    Final                    Value:Surgical Pathology Report                         Case: [id number]                                  Authorizing Provider:  Susan Brizuela MD  Collected:           09/17/2021 09:56 AM          Ordering Location:     [facility]    Received:            09/20/2021 07:56 AM                                 [facility] END[facility]                                                                    Pathologist:           Cristal Soares, " DO                                                        Specimens:   1) - Small Intestine, Ileum, Terminal Ileum                                                         2) - Large Intestine, Cecum, cecum                                                                  3) - Large Intestine, Right / Ascending Colon, ascending and cecum                                  4) - Large Intestine, Transverse Colon                                                              5) - Large Intestine, Left / Descending Colon                                                                                 6) - Large Intestine, Sigmoid Colon                                                                 7) - Large Intestine, Sigmoid Colon, sigmoid polyp biopsy                                           8) - Large Intestine, Rectum                                                              • Final Diagnosis 09/17/2021    Final                    Value:This result contains rich text formatting which cannot be displayed here.   Office Visit on 09/13/2021   Component Date Value Ref Range Status   • Calprotectin, Fecal 09/13/2021 1068* 0 - 120 ug/g Final    Concentration     Interpretation   Follow-Up  <16 - 50 ug/g     Normal           None  >50 -120 ug/g     Borderline       Re-evaluate in 4-6 weeks      >120 ug/g     Abnormal         Repeat as clinically                                     indicated   Admission on 08/02/2021, Discharged on 08/02/2021   Component Date Value Ref Range Status   • HCG, Urine, QL 08/02/2021 Negative  Negative Final   • Lot Number 08/02/2021 [id number]   Final   • Internal Positive Control 08/02/2021 Positive  Positive, Passed Final   • Internal Negative Control 08/02/2021 Negative  Negative, Passed Final   • Case Report 08/02/2021    Final                    Value:Surgical Pathology Report                         Case: [id number]                                  Authorizing Provider:  Susan Brizuela  MD  Collected:           08/02/2021 02:31 PM          Ordering Location:     [facility]    Received:            08/03/2021 09:26 AM                                 S[facility] ENDO                                                                    Pathologist:           Sam Bautista MD                                                     Specimens:   1) - Small Intestine, Duodenum, BX FOR IRON DEFICIENCY ANEMIA                                       2) - Gastric, Antrum, ANTRUM AND BODY BX FOR H-PYLORI                                     • Final Diagnosis 08/02/2021    Final                    Value:This result contains rich text formatting which cannot be displayed here.   Lab on 07/30/2021   Component Date Value Ref Range Status   • COVID19 07/30/2021 Not Detected  Not Detected - Ref. Range Final   Lab on 07/27/2021   Component Date Value Ref Range Status   • C-Reactive Protein 07/27/2021 0.69* 0.00 - 0.50 mg/dL Final   • WBC 07/27/2021 7.33  3.40 - 10.80 10*3/mm3 Final   • RBC 07/27/2021 3.65* 3.77 - 5.28 10*6/mm3 Final   • Hemoglobin 07/27/2021 10.6* 12.0 - 15.9 g/dL Final   • Hematocrit 07/27/2021 32.8* 34.0 - 46.6 % Final   • MCV 07/27/2021 89.9  79.0 - 97.0 fL Final   • MCH 07/27/2021 29.0  26.6 - 33.0 pg Final   • MCHC 07/27/2021 32.3  31.5 - 35.7 g/dL Final   • RDW 07/27/2021 16.7* 12.3 - 15.4 % Final   • RDW-SD 07/27/2021 54.0  37.0 - 54.0 fl Final   • MPV 07/27/2021 10.8  6.0 - 12.0 fL Final   • Platelets 07/27/2021 487* 140 - 450 10*3/mm3 Final   • Neutrophil % 07/27/2021 72.5  42.7 - 76.0 % Final   • Lymphocyte % 07/27/2021 16.8* 19.6 - 45.3 % Final   • Monocyte % 07/27/2021 8.3  5.0 - 12.0 % Final   • Eosinophil % 07/27/2021 1.5  0.3 - 6.2 % Final   • Basophil % 07/27/2021 0.5  0.0 - 1.5 % Final   • Immature Grans % 07/27/2021 0.4  0.0 - 0.5 % Final   • Neutrophils, Absolute 07/27/2021 5.31  1.70 - 7.00 10*3/mm3 Final   • Lymphocytes, Absolute 07/27/2021 1.23  0.70 - 3.10 10*3/mm3 Final    • Monocytes, Absolute 07/27/2021 0.61  0.10 - 0.90 10*3/mm3 Final   • Eosinophils, Absolute 07/27/2021 0.11  0.00 - 0.40 10*3/mm3 Final   • Basophils, Absolute 07/27/2021 0.04  0.00 - 0.20 10*3/mm3 Final   • Immature Grans, Absolute 07/27/2021 0.03  0.00 - 0.05 10*3/mm3 Final   • nRBC 07/27/2021 0.0  0.0 - 0.2 /100 WBC Final   • Glucose 07/27/2021 81  65 - 99 mg/dL Final   • BUN 07/27/2021 5* 6 - 20 mg/dL Final   • Creatinine 07/27/2021 0.68  0.57 - 1.00 mg/dL Final   • Sodium 07/27/2021 141  136 - 145 mmol/L Final   • Potassium 07/27/2021 4.1  3.5 - 5.2 mmol/L Final   • Chloride 07/27/2021 104  98 - 107 mmol/L Final   • CO2 07/27/2021 26.0  22.0 - 29.0 mmol/L Final   • Calcium 07/27/2021 9.6  8.6 - 10.5 mg/dL Final   • Total Protein 07/27/2021 7.4  6.0 - 8.5 g/dL Final   • Albumin 07/27/2021 4.50  3.50 - 5.20 g/dL Final   • ALT (SGPT) 07/27/2021 9  1 - 33 U/L Final   • AST (SGOT) 07/27/2021 16  1 - 32 U/L Final   • Alkaline Phosphatase 07/27/2021 96  39 - 117 U/L Final   • Total Bilirubin 07/27/2021 0.2  0.0 - 1.2 mg/dL Final   • eGFR Non  Amer 07/27/2021 94  >60 mL/min/1.73 Final   • Globulin 07/27/2021 2.9  gm/dL Final   • A/G Ratio 07/27/2021 1.6  g/dL Final   • BUN/Creatinine Ratio 07/27/2021 7.4  7.0 - 25.0 Final   • Anion Gap 07/27/2021 11.0  5.0 - 15.0 mmol/L Final   • Iron 07/27/2021 60  37 - 145 mcg/dL Final   • Iron Saturation 07/27/2021 18* 20 - 50 % Final   • Transferrin 07/27/2021 228  200 - 360 mg/dL Final   • TIBC 07/27/2021 340  298 - 536 mcg/dL Final      No radiology results for the last 90 days.  Colonoscopy 9/17/2021  - One 6 mm polyp in the cecum, removed with a hot snare. Resected and retrieved.  - One 12 to 15 mm polyp in the cecum, removed with mucosal resection. Resected and retrieved. Clips (MR  conditional) were placed.  - Pancolitis. Inflammation was found from the rectum to the hepatic flexure. This was graded as Luu Score 3  (severe disease), improved compared to previous  examinations, severe mostly from rectum to DC . Biopsied.  - The examined portion of the ileum was normal. Biopsied.  - Mucosal resection was performed. Resection was complete, and retrieval was complete.  - Four biopsies were obtained in the rectum, in the sigmoid colon, in the descending colon, in the transverse  colon, in the ascending colon and in the cecum.    EGD 8/2/2021  - Normal oropharynx.  - Z-line regular, 36 cm from the incisors.  - No gross lesions in esophagus.  - Chronic gastritis. Biopsied.  - Normal duodenal bulb, first portion of the duodenum, second portion of the duodenum and third portion of the  duodenum. Biopsied.  - Her nausea could be secondary to Apriso. H pylori to rule out  - Clinically she still seem to have significant sympotoms.  - Will repeat stool calprotectoin in 3 months time and if she has stilll has more than 5 BM, she needs surveillance  colonoscopy and possible biologics    Assessment / Plan      1.  Ulcerative pancolitis  2.  History of C. difficile colitis  10/4/2021  Her colonoscopy done on 9/17/2021 revealed a significant colitis involving the left side colon with pseudopolyps.  She still has a deep ulcerations in the sigmoid colon and the rectum.  Colonoscopy done in 2020 her ulcerations significantly healed but still deep ulcerations persists in few areas in the sigmoid and rectum.  She had a recent C. difficile colitis in June 2021 which was treated.  Currently patient is on steroid tapering with a good significant symptom relief.  Based on the colonoscopy findings Imuran and mesalamine did not improve her disease  Will start her on Humira or Remicade.  We will continue with the Imuran and will slowly reduce the Apriso dose and discontinue when once patient is fully on Humira maintenance and the Imuran.  I have discussed the risk and benefits involved with the Humira including the high risk of opportunistic infections, blood cancers or skin cancers, optic neuritis  etc.    Her hepatitis B vaccine with booster is pending, that can be obtained at PCPs office or the health department  Prescription written for Humira induction with maintenance  We will follow her up in 8 weeks time.     12/9/2020   she had a colonoscopy done on 11/17/2020 which revealed extensive diffuse colitis involving the colon up to the hepatic flexure from the rectum.  Dispersed inflammation noted from hyperflexion to the cecum.  Examined terminal ileum for about 15 cm from IC valve was normal.   Pathology revealed acute severe colitis with crypt abscess.  Patient had chronic diarrhea more than 2 months. These findings are more in favor of ulcerative colitis.   Recent TB Gold test was negative.  Chronic hepatitis panel was negative for any chronic hep C or hep B infection.   She is currently on a tapering dose of steroids  She would definitely benefit from Biologics however at this time patient's insurance does not cover Remicade or the Humira or newer Biologics.   We will start her on Apriso along with the Imuran and see how she does with that regimen.   If no significant improvement with that for the next 6 months to 12 months we will consider Biologics and get a prior authorization  Started on Apriso 0.375 g p.o. 4 capsules daily  We will add Imuran 100 mg p.o. daily when once a TPMT genetics available  She is immune to hepatitis A but not to hepatitis B.  She needs a hep B vaccine as outpatient with the booster  Advised flu vaccine and COVID-19 vaccine     11/10/2020  Acute episode of bloody diarrhea, which has progressed now to chronic.  Symptoms more suggestive infective colitis however IBD cannot be ruled out.   She states that she finished 10-day course of Augmentin given at the ED but she still has ongoing bloody diarrhea with minimal left lower quadrant abdominal pain.   Her basic lab work done in the emergency room 2 weeks ago did not reveal any significant abnormality except borderline  leukocytosis.  CT of the abdomen pelvis done revealed a thickened descending colon and the sigmoid colon suggesting colitis.  No family history of any IBD.   Given her persistent symptoms we will schedule her for colonoscopy for further evaluation  We will also get a stool for C. difficile and the stool for GI panel     2.  Iron deficiency anemia  10/4/2021   Her recent lab work done in 6/2021 revealed HGB of 10.6g/dl .  No current signs of any GI bleed.  We will continue her iron pills for now  She had a prior significant bloody diarrhea causing her anemia    3.  Adenomatous colon polyp  Colonoscopy done on 9/17/2021 revealed a large advanced flat polyp in the cecum which was removed with EMR technique.   Pathology consistent with sessile serrated adenoma.  Due to advanced polyp and IBD we will repeat the colonoscopy in 1 year time in September 2020          Follow Up:   Return in about 8 weeks (around 11/29/2021).    Susan Brizuela MD  [facility] [place]  10/4/2021  17:32 EDT     Please note that portions of this note may have been completed with a voice recognition program.

## 2021-11-03 ENCOUNTER — TELEPHONE (OUTPATIENT)
Dept: GASTROENTEROLOGY | Facility: CLINIC | Age: 46
End: 2021-11-03

## 2021-11-05 ENCOUNTER — TELEPHONE (OUTPATIENT)
Dept: GASTROENTEROLOGY | Facility: CLINIC | Age: 46
End: 2021-11-05

## 2021-11-05 NOTE — TELEPHONE ENCOUNTER
----- Message from Michelle Correa sent at 11/5/2021  9:27 AM EDT -----  Regarding: RE: suki on [employer] auth    I resubmitted for the auth on 10/27 and they denied it d/t duplicate request.  I submitted an expedited appeal this morning, so hopefully they will review it quickly.  If they feel it isn't urgent, they will review it as standard appeal.  I will let you know as soon as I get something back.    Thanks  Michelle  ----- Message -----  From: Leslie Dalton PA-C  Sent: 11/4/2021   1:26 PM EDT  To: Michelle Correa  Subject: RE: suki on [employer] auth                        What is the status of [employer] authorization?

## 2021-11-11 DIAGNOSIS — E61.1 IRON DEFICIENCY: ICD-10-CM

## 2021-11-11 RX ORDER — FERROUS SULFATE 325(65) MG
1 TABLET ORAL
Qty: 30 TABLET | Refills: 3 | Status: SHIPPED | OUTPATIENT
Start: 2021-11-11 | End: 2022-03-09 | Stop reason: SDUPTHER

## 2021-11-18 ENCOUNTER — LAB (OUTPATIENT)
Dept: LAB | Facility: HOSPITAL | Age: 46
End: 2021-11-18

## 2021-11-18 ENCOUNTER — OFFICE VISIT (OUTPATIENT)
Dept: GASTROENTEROLOGY | Facility: CLINIC | Age: 46
End: 2021-11-18

## 2021-11-18 VITALS
WEIGHT: 144 LBS | SYSTOLIC BLOOD PRESSURE: 102 MMHG | DIASTOLIC BLOOD PRESSURE: 62 MMHG | HEIGHT: 64 IN | TEMPERATURE: 97.7 F | BODY MASS INDEX: 24.59 KG/M2

## 2021-11-18 DIAGNOSIS — K51.011 ULCERATIVE PANCOLITIS WITH RECTAL BLEEDING (HCC): ICD-10-CM

## 2021-11-18 DIAGNOSIS — R11.0 NAUSEA: Primary | ICD-10-CM

## 2021-11-18 DIAGNOSIS — D50.0 IRON DEFICIENCY ANEMIA DUE TO CHRONIC BLOOD LOSS: ICD-10-CM

## 2021-11-18 LAB
ALBUMIN SERPL-MCNC: 4.7 G/DL (ref 3.5–5.2)
ALBUMIN/GLOB SERPL: 1.6 G/DL
ALP SERPL-CCNC: 111 U/L (ref 39–117)
ALT SERPL W P-5'-P-CCNC: 5 U/L (ref 1–33)
ANION GAP SERPL CALCULATED.3IONS-SCNC: 9.2 MMOL/L (ref 5–15)
AST SERPL-CCNC: 20 U/L (ref 1–32)
BASOPHILS # BLD AUTO: 0.05 10*3/MM3 (ref 0–0.2)
BASOPHILS NFR BLD AUTO: 0.6 % (ref 0–1.5)
BILIRUB SERPL-MCNC: <0.2 MG/DL (ref 0–1.2)
BUN SERPL-MCNC: 7 MG/DL (ref 6–20)
BUN/CREAT SERPL: 9.5 (ref 7–25)
CALCIUM SPEC-SCNC: 9.6 MG/DL (ref 8.6–10.5)
CHLORIDE SERPL-SCNC: 103 MMOL/L (ref 98–107)
CO2 SERPL-SCNC: 29.8 MMOL/L (ref 22–29)
CREAT SERPL-MCNC: 0.74 MG/DL (ref 0.57–1)
DEPRECATED RDW RBC AUTO: 52.5 FL (ref 37–54)
EOSINOPHIL # BLD AUTO: 0.29 10*3/MM3 (ref 0–0.4)
EOSINOPHIL NFR BLD AUTO: 3.7 % (ref 0.3–6.2)
ERYTHROCYTE [DISTWIDTH] IN BLOOD BY AUTOMATED COUNT: 15.8 % (ref 12.3–15.4)
GFR SERPL CREATININE-BSD FRML MDRD: 85 ML/MIN/1.73
GLOBULIN UR ELPH-MCNC: 2.9 GM/DL
GLUCOSE SERPL-MCNC: 88 MG/DL (ref 65–99)
HCT VFR BLD AUTO: 35.7 % (ref 34–46.6)
HGB BLD-MCNC: 11.7 G/DL (ref 12–15.9)
IMM GRANULOCYTES # BLD AUTO: 0.03 10*3/MM3 (ref 0–0.05)
IMM GRANULOCYTES NFR BLD AUTO: 0.4 % (ref 0–0.5)
LYMPHOCYTES # BLD AUTO: 1.84 10*3/MM3 (ref 0.7–3.1)
LYMPHOCYTES NFR BLD AUTO: 23.5 % (ref 19.6–45.3)
MCH RBC QN AUTO: 29.9 PG (ref 26.6–33)
MCHC RBC AUTO-ENTMCNC: 32.8 G/DL (ref 31.5–35.7)
MCV RBC AUTO: 91.3 FL (ref 79–97)
MONOCYTES # BLD AUTO: 0.52 10*3/MM3 (ref 0.1–0.9)
MONOCYTES NFR BLD AUTO: 6.6 % (ref 5–12)
NEUTROPHILS NFR BLD AUTO: 5.11 10*3/MM3 (ref 1.7–7)
NEUTROPHILS NFR BLD AUTO: 65.2 % (ref 42.7–76)
NRBC BLD AUTO-RTO: 0 /100 WBC (ref 0–0.2)
PLATELET # BLD AUTO: 363 10*3/MM3 (ref 140–450)
PMV BLD AUTO: 11.3 FL (ref 6–12)
POTASSIUM SERPL-SCNC: 3.8 MMOL/L (ref 3.5–5.2)
PROT SERPL-MCNC: 7.6 G/DL (ref 6–8.5)
RBC # BLD AUTO: 3.91 10*6/MM3 (ref 3.77–5.28)
SODIUM SERPL-SCNC: 142 MMOL/L (ref 136–145)
WBC NRBC COR # BLD: 7.84 10*3/MM3 (ref 3.4–10.8)

## 2021-11-18 PROCEDURE — 99213 OFFICE O/P EST LOW 20 MIN: CPT | Performed by: INTERNAL MEDICINE

## 2021-11-18 PROCEDURE — 85025 COMPLETE CBC W/AUTO DIFF WBC: CPT

## 2021-11-18 PROCEDURE — 80053 COMPREHEN METABOLIC PANEL: CPT

## 2021-11-18 PROCEDURE — 36415 COLL VENOUS BLD VENIPUNCTURE: CPT

## 2021-11-18 RX ORDER — MESALAMINE 0.38 G/1
1.5 CAPSULE, EXTENDED RELEASE ORAL DAILY
Qty: 120 CAPSULE | Refills: 5 | Status: SHIPPED | OUTPATIENT
Start: 2021-11-18 | End: 2022-07-14

## 2021-11-18 NOTE — PROGRESS NOTES
"     Follow Up Note     Date: 2021   Patient Name: Stacy Guardado  MRN: [id number]  : 1975     Referring Physician: Shayna Swartz PA    Chief Complaint:    Chief Complaint   Patient presents with   • Follow-up   • Ulcerative Colitis       Interval History:   2021  Stacy Guardado is a 46 y.o. female who is here today for follow up for her ulcerative colitis.  She is feeling much better now that he has resolved she has been having bowel movement anywhere 2 to 3/day soft without any blood without any significant abdominal pain.  Her nausea also has improved.    2021  Stacy Guardado is a 46 y.o. female who is here today for follow up regarding recent EGD and Ulcerative Colitis.     She continues to have 5-6 loose stools daily with fecal urgency. Nausea is still present daily. She has fatigue. Still taking iron supplement daily. Taking Apriso 4 capsules each morning plus imuran 50 mg 2 tablets daily for treatment of UC. She had EGD recently and would like to discuss those results. Rectal bleeding has been intermittent over the past several months but recently has been present for weeks. She has bright red blood per rectum and feels \"raw\" in the rectal area.      Interval History:  2021  Feeling dramatically better overall. Still taking probiotic and iron supplement daily. Still taking Apriso 4 capsules in the morning plus Imuran 50 mg 2 tablets daily for UC. She took antibiotics (vancomycin) as prescribed for treatment of c diff found on last stool testing. She had improvement in foul smelling stools and abdominal pain about half way through treatment with vancomycin. She still has intermittent nausea, occurring several days per week, requests zofran to take as needed which has helped in the past.  Takes her medications with food. Reports still having 5-6 loose stools per day which mostly occur after meals. No rectal bleeding. Last labs in " 6/2021.      3/10/2021  Since her last visit, she reports an overall improvement in her illness. She feels better but still has 5 stools per day which are described as small soft pieces of stool or sometimes loose in consistency. She has intermittent bright red rectal bleeding with 1-2 stools intermittently. Blood is described as mixed in with stool but not large in amount. This occurs about 1 time per week over the past 1.5 months. She is only taking Apriso 4 caps in the morning daily, never started azathioprine as directed because she was confused as to why she should take it and worried about the side effects. Complains of current fatigue and cold intolerance. Is not currently taking iron supplements but has a history of TOMI. She has rare left sided flank pain which is mild to moderate when it occurs. Her new diagnosis of ulcerative colitis was made after colonoscopy was completed in 11/2020.      11/10/2020  She states that she noted to have red blood in stool three weeks ago and that got worse with loose stool.She also develeloped LLQ abdominal pain. Loose stool 5-7 times daily. Deny any fever chills. Patient was in emergency room 2 weeks ago with complaints of diarrhea abdominal pain and blood in the stool.  She had basic lab work done which revealed borderline leukocytosis.  She deny any prior constipation. She also had a CT scan of the abdomen pelvis done which revealed thickening in the descending colon the sigmoid suggesting colitis. She was given Augmentin for ten days.    Weight is stable. Pt denies nausea vomiting or odynophagia or dysphagia. There is no history of acid reflux. There is no history of anemia. No prior history of EGD or colonoscopy. No family history of colon cancer or any GI malignancy, or IBD. No history of any abdominal surgery. Denies alcohol abuse or cigarette smoking.      Subjective      Past Medical History:   Past Medical History:   Diagnosis Date   • Abnormal ECG     tachycardia    • Bipolar affective (HCC)    • Diverticulitis    • Multiple body piercings     ears x 5   • Palpitations    • Tattoos 2020    x's 3   • Tattoos    • Ulcerative colitis (HCC)      Past Surgical History:   Past Surgical History:   Procedure Laterality Date   • COLONOSCOPY N/A 2020    Procedure: COLONOSCOPY WITH BIOPSIES;  Surgeon: Susan Brizuela MD;  Location: [facility] ENDOSCOPY;  Service: Gastroenterology;  Laterality: N/A;   • COLONOSCOPY N/A 2021    Procedure: COLONOSCOPY with biopsy,  polypectomy, endoscopic with hot snare polypectomy mucosal resection with injection of normal saline soft colaguation with clip placement x 3;  Surgeon: Susan Brizuela MD;  Location: [facility] ENDOSCOPY;  Service: Gastroenterology;  Laterality: N/A;   • ENDOSCOPY N/A 2021    Procedure: ESOPHAGOGASTRODUODENOSCOPY WITH BIOPSY ;  Surgeon: Susan Brizuela MD;  Location: [facility] ENDOSCOPY;  Service: Gastroenterology;  Laterality: N/A;   • WISDOM TOOTH EXTRACTION         Family History:   Family History   Problem Relation Age of Onset   • Hyperlipidemia Mother    • Hyperlipidemia Father    • Hypertension Father    • Stroke Brother    • Heart attack Maternal Grandfather    • Heart disease Maternal Grandfather    • Heart disease Paternal Grandfather    • Heart attack Paternal Grandfather    • Hyperlipidemia Brother    • Alcohol abuse Other    • Diabetes Other    • Hypertension Other    • Stroke Other    • Colon cancer Neg Hx    • Cirrhosis Neg Hx    • Liver cancer Neg Hx    • Liver disease Neg Hx        Social History:   Social History     Socioeconomic History   • Marital status:    Tobacco Use   • Smoking status: Former Smoker     Packs/day: 1.00     Types: Cigarettes     Quit date: 2019     Years since quittin.5   • Smokeless tobacco: Never Used   Vaping Use   • Vaping Use: Some days   • Last attempt to quit: 3/18/2020   • Substances: Nicotine, Flavoring   • Devices: Pre-filled or  refillable cartridge, Refillable tank   Substance and Sexual Activity   • Alcohol use: Yes     Comment: socially   • Drug use: No   • Sexual activity: Defer       Medications:     Current Outpatient Medications:   •  Adalimumab (HUMIRA SC), Inject  under the skin into the appropriate area as directed., Disp: , Rfl:   •  azaTHIOprine (IMURAN) 50 MG tablet, Take 2 tablets by mouth Daily., Disp: 60 tablet, Rfl: 5  •  cetirizine (zyrTEC) 10 MG tablet, Take 10 mg by mouth Daily., Disp: , Rfl:   •  ferrous sulfate (FeroSul) 325 (65 FE) MG tablet, Take 1 tablet by mouth Daily With Breakfast., Disp: 30 tablet, Rfl: 3  •  mesalamine (APRISO) 0.375 g 24 hr capsule, Take 4 capsules by mouth Daily., Disp: 120 capsule, Rfl: 5  •  ondansetron ODT (ZOFRAN-ODT) 4 MG disintegrating tablet, Place 1 tablet on the tongue Every 8 (Eight) Hours As Needed for Nausea or Vomiting., Disp: 42 tablet, Rfl: 2  •  Pediatric Multivitamins-Fl (Multiple Vitamins/Fluoride) 1 MG chewable tablet, Chew 1 capsule Daily., Disp: , Rfl:   •  Probiotic Product (Align) chewable tablet, Chew., Disp: , Rfl:   •  Probiotic Product (PROBIOTIC DAILY PO), Take 1 capsule by mouth Daily., Disp: , Rfl:     Allergies:   Allergies   Allergen Reactions   • Augmentin [Amoxicillin-Pot Clavulanate] Diarrhea and GI Intolerance       Review of Systems:   Review of Systems   Constitutional: Negative for appetite change, fatigue, fever and unexpected weight loss.   HENT: Negative for trouble swallowing.    Gastrointestinal: Positive for nausea. Negative for abdominal distention, abdominal pain, anal bleeding, blood in stool, constipation, diarrhea, rectal pain, vomiting, GERD and indigestion.       The following portions of the patient's history were reviewed and updated as appropriate: allergies, current medications, past family history, past medical history, past social history, past surgical history and problem list.    Objective     Physical Exam:  Vital Signs:   Vitals:  "   11/18/21 1329   BP: 102/62   Temp: 97.7 °F (36.5 °C)   TempSrc: Infrared   Weight: 65.3 kg (144 lb)   Height: 162.6 cm (64\")       Physical Exam  Constitutional:       Appearance: Normal appearance.   HENT:      Head: Normocephalic and atraumatic.   Eyes:      Conjunctiva/sclera: Conjunctivae normal.   Abdominal:      General: Abdomen is flat. There is no distension.      Palpations: There is no mass.      Tenderness: There is no abdominal tenderness. There is no guarding or rebound.      Hernia: No hernia is present.   Musculoskeletal:      Cervical back: Normal range of motion and neck supple.   Neurological:      Mental Status: She is alert.         Results Review:   I reviewed the patient's new clinical results.    Admission on 09/17/2021, Discharged on 09/17/2021   Component Date Value Ref Range Status   • C Diff GDH / Toxin 09/17/2021 Negative  Negative Final   • Case Report 09/17/2021    Final                    Value:Surgical Pathology Report                         Case: [id number]                                  Authorizing Provider:  Susan Brizuela MD  Collected:           09/17/2021 09:56 AM          Ordering Location:     [facility]    Received:            09/20/2021 07:56 AM                                 [facility]                                                                    Pathologist:           Cristal Soares DO                                                        Specimens:   1) - Small Intestine, Ileum, Terminal Ileum                                                         2) - Large Intestine, Cecum, cecum                                                                  3) - Large Intestine, Right / Ascending Colon, ascending and cecum                                  4) - Large Intestine, Transverse Colon                                                              5) - Large Intestine, Left / Descending Colon                                                  "                                6) - Large Intestine, Sigmoid Colon                                                                 7) - Large Intestine, Sigmoid Colon, sigmoid polyp biopsy                                           8) - Large Intestine, Rectum                                                              • Final Diagnosis 09/17/2021    Final                    Value:This result contains rich text formatting which cannot be displayed here.   Office Visit on 09/13/2021   Component Date Value Ref Range Status   • Calprotectin, Fecal 09/13/2021 1068* 0 - 120 ug/g Final    Concentration     Interpretation   Follow-Up  <16 - 50 ug/g     Normal           None  >50 -120 ug/g     Borderline       Re-evaluate in 4-6 weeks      >120 ug/g     Abnormal         Repeat as clinically                                     indicated      No radiology results for the last 90 days.    Assessment / Plan      1. Ulcerative pancolitis   2.  History of C. difficile colitis  3.  Iron deficiency anemia  4.  Nausea  11/18/2021  Patient appears to be in clinical remission now with a 2-3 soft bowel movement without any abdominal pain or blood in stool.  She finished her course of tapering dose of steroids.  She is still awaiting her first induction dose of Humira.  She does have a iron deficiency anemia associated with IBD and lower GI bleed from ulcerations.  She also has mild nausea associated with mesalamine.  Her last episode of C. difficile colitis was in July 2021  We will continue her Apriso and Imuran for now.  We will consider discontinuing her mesalamine 6 months after Humira shot started.   CBC CMP now.  We will get a stool calprotectin in next 3 months time.  We will continue her iron pills for now.     09/13/21  She continues to have 5-6 loose stools daily. It does not seem that her ulcerative colitis is controlled with current regimen of apriso 4 capsules daily plus imuran 50 mg 2 tablets daily. Will likely need step  up therapy with biologics. She has already had negative TB screen and hepatitis screening. She will repeat fecal calprotectin now. Colonoscopy will be arranged urgently so that we can change therapy to better manage her IBD. Her last colonoscopy showed severe ulcerative colitis from rectum to hepatic flexure in 11/2020. Luu score was scored after the endoscopy and endoscopic findings.  Total Luu score for ulcerative colitis severity was 9 ( stool pattern 3 points, rectal bleeding 0 points, endoscopic findings 3 points,  global assessment 3 points)-      She will have a colonoscopy performed with monitored anesthesia care. The indications, technique, alternatives and potential risk and complications were discussed with the patient including but not limited to bleeding, bowel perforations, missing lesions and anesthetic complications. The patient understands and wishes to proceed with the procedure and has given their verbal consent. Written patient education information was given to the patient. She should follow up in the office after this procedure to discuss the results and further recommendations can be made at that time. The patient will call if they have further questions before procedure.       7/27/2021  She is feeling better now than she has in months. She has continued medications for treatment of UC as directed. Will continue apriso 4 capsules every morning plus azathioprine 50 mg 2 tablets PO daily. She already had COVID injection (sergio and sergio) Reviewed previous lab results with her in detail today. CRP was significantly elevated but she also had positive C diff at that time. Stool frequency is still more than preferred with 5 stools daily which are loose. No current rectal bleeding and no abdominal pain. Labs today including CBC, CMP, CRP and iron profile.      6/9/2021  Her symptoms of the ulcerative colitis are currently not controlled. She is still having 7 days stools daily, rectal bleeding  and left-sided abdominal pain. She has been taking azathioprine 50 mg 2 tablets once daily plus mesalamine (Apriso) 4 capsules once daily exactly as directed. She will complete lab testing today including CBC, CMP, iron profile and C-reactive protein. She will also have stool testing to rule out any infection including C. difficile and GI panel. Stool calprotectin will gauge bowel inflammation. Her previous calprotectin was elevated at 384 prior to starting the azathioprine.  She may need step up in therapy for treatment of her UC.     3/10/2021  She is feeling better with minimal abdominal pain but still having loose stools and intermittent rectal bleeding. She is taking mesalamine (Apriso) but did not start previously recommended azathioprine (Imuran). For now, I have asked her to complete basic labs as well as iron profile, CRP, B12, vit D and calprotectin stool. Results will be reviewed and depending on those results, may hold off on starting Imuran due to patient preference.   She has already had screening labs needed before starting immunosuppressive therapy.   Reminded her that it is recommended that she get covid vaccination.      12/9/2020   she had a colonoscopy done on 11/17/2020 which revealed extensive diffuse colitis involving the colon up to the hepatic flexure from the rectum.  Dispersed inflammation noted from hyperflexion to the cecum.  Anaspaz normal.  Examined terminal ileum for about 15 cm from IC valve was normal. Pathology revealed acute severe colitis with crypt abscess.  Patient had chronic diarrhea more than 2 months. These findings are more in favor of ulcerative colitis. Recent TB Gold test was negative.  Chronic hepatitis panel was negative for any chronic hep C or hep B infection. She is currently on a tapering dose of steroids. She would definitely benefit from Biologics however at this time patient's insurance does not cover Remicade or the Humira or newer Biologics. We will start her  on Apriso along with the Imuran and see how she does with that regimen.   If no significant improvement with that for the next 6 months to 12 months we will consider Biologics and get a prior authorization  Started on Apriso 0.375 g p.o. 4 capsules daily. We will add Imuran 100 mg p.o. daily when once a TPMT genetics available  She is immune to hepatitis A but not to hepatitis B.  She needs a hep B vaccine as outpatient with the booster  Advised flu vaccine and COVID-19 vaccine     11/10/2020  Acute episode of bloody diarrhea, which has progressed now to chronic.  Symptoms more suggestive infective colitis however IBD cannot be ruled out. She states that she finished 10-day course of Augmentin given at the ED but she still has ongoing bloody diarrhea with minimal left lower quadrant abdominal pain. Her basic lab work done in the emergency room 2 weeks ago did not reveal any significant abnormality except borderline leukocytosis.  CT of the abdomen pelvis done revealed a thickened descending colon and the sigmoid colon suggesting colitis.  No family history of any IBD. Given her persistent symptoms we will schedule her for colonoscopy for further evaluation  We will also get a stool for C. difficile and the stool for GI panel            Follow Up:   Return for follow up after procedure to discuss results.    Follow Up:   No follow-ups on file.    Susan Brizuela MD  [facility]  11/18/2021  13:35 EST     Please note that portions of this note may have been completed with a voice recognition program.

## 2021-11-19 ENCOUNTER — TELEPHONE (OUTPATIENT)
Dept: GASTROENTEROLOGY | Facility: CLINIC | Age: 46
End: 2021-11-19

## 2021-11-19 NOTE — TELEPHONE ENCOUNTER
CALLED [facility] TO CHECK ON PATIENT'S RX FOR HUMIRA. SPOKE WITH MAICOL. SHE STATES THAT THEY CALLED PATIENT THIS WEEK BUT WAS UNABLE TO CONTACT. CALLED PATIENT AND GAVE HER THE PHONE NUMBER TO CONTACT [facility] [phone]

## 2021-12-02 ENCOUNTER — TRANSCRIBE ORDERS (OUTPATIENT)
Dept: ADMINISTRATIVE | Facility: HOSPITAL | Age: 46
End: 2021-12-02

## 2021-12-02 DIAGNOSIS — Z12.31 BREAST CANCER SCREENING BY MAMMOGRAM: Primary | ICD-10-CM

## 2021-12-10 ENCOUNTER — TELEPHONE (OUTPATIENT)
Dept: GASTROENTEROLOGY | Facility: CLINIC | Age: 46
End: 2021-12-10

## 2021-12-10 NOTE — TELEPHONE ENCOUNTER
Had initiation dose of Humira at [facility] in [place] on 12/8, scheduled to go back for next dose on 12/22. They will teach her to use then she will have at home for maintenance dosing.

## 2021-12-23 ENCOUNTER — TELEPHONE (OUTPATIENT)
Dept: GASTROENTEROLOGY | Facility: CLINIC | Age: 46
End: 2021-12-23

## 2021-12-23 DIAGNOSIS — K51.00 ULCERATIVE PANCOLITIS (HCC): Primary | ICD-10-CM

## 2021-12-23 RX ORDER — ADALIMUMAB 40MG/0.8ML
40 KIT SUBCUTANEOUS
Qty: 2 EACH | Refills: 11 | Status: SHIPPED | OUTPATIENT
Start: 2021-12-23 | End: 2022-03-14 | Stop reason: SDUPTHER

## 2021-12-23 NOTE — TELEPHONE ENCOUNTER
----- Message from Johanne Beatty MA sent at 12/23/2021  1:05 PM EST -----  She said they told her she has to get provider to send in the script to her pharmacy  ----- Message -----  From: Leslie Dalton PA-C  Sent: 12/23/2021  12:48 PM EST  To: Johanne Beatty MA    She received the first 2 inj at [facility]. Are they sending her the maintenance dose??  ----- Message -----  From: Johanne Beatty MA  Sent: 12/23/2021  12:02 PM EST  To: Leslie Dalton PA-C    She has been taught to give herself the meds, but she doesn't know how to get the next dose,

## 2022-01-04 ENCOUNTER — OFFICE VISIT (OUTPATIENT)
Dept: GASTROENTEROLOGY | Facility: CLINIC | Age: 47
End: 2022-01-04

## 2022-01-04 VITALS
DIASTOLIC BLOOD PRESSURE: 80 MMHG | HEIGHT: 64 IN | BODY MASS INDEX: 25.44 KG/M2 | WEIGHT: 149 LBS | SYSTOLIC BLOOD PRESSURE: 110 MMHG | TEMPERATURE: 97.7 F

## 2022-01-04 DIAGNOSIS — K51.00 ULCERATIVE PANCOLITIS: Primary | ICD-10-CM

## 2022-01-04 DIAGNOSIS — D50.0 IRON DEFICIENCY ANEMIA DUE TO CHRONIC BLOOD LOSS: ICD-10-CM

## 2022-01-04 PROCEDURE — 99213 OFFICE O/P EST LOW 20 MIN: CPT | Performed by: INTERNAL MEDICINE

## 2022-01-04 NOTE — PROGRESS NOTES
"     Follow Up Note     Date: 2022   Patient Name: Stacy Guardado  MRN: [id number]  : 1975     Referring Physician: Shayna Swartz PA    Chief Complaint:    Chief Complaint   Patient presents with   • Follow-up   • Nausea   Ulcerative colitis.    Interval History:   2022  Stacy Guaraddo is a 46 y.o. female who is here today for follow up for ulcerative colitis.  She is doing very well with 1 or 2 soft formed bowel movement now without any blood in the stool.  No significant abdominal pain or nausea.  She had a induction dose of Humira and due for her first maintenance subcu shot tomorrow on 2021  Stacy Guardado is a 46 y.o. female who is here today for follow up for her ulcerative colitis.  She is feeling much better now that he has resolved she has been having bowel movement anywhere 2 to 3/day soft without any blood without any significant abdominal pain.  Her nausea also has improved.     2021  Stacy Guardado is a 46 y.o. female who is here today for follow up regarding recent EGD and Ulcerative Colitis.     She continues to have 5-6 loose stools daily with fecal urgency. Nausea is still present daily. She has fatigue. Still taking iron supplement daily. Taking Apriso 4 capsules each morning plus imuran 50 mg 2 tablets daily for treatment of UC. She had EGD recently and would like to discuss those results. Rectal bleeding has been intermittent over the past several months but recently has been present for weeks. She has bright red blood per rectum and feels \"raw\" in the rectal area.      Interval History:  2021  Feeling dramatically better overall. Still taking probiotic and iron supplement daily. Still taking Apriso 4 capsules in the morning plus Imuran 50 mg 2 tablets daily for UC. She took antibiotics (vancomycin) as prescribed for treatment of c diff found on last stool testing. She had improvement in foul smelling stools and " abdominal pain about half way through treatment with vancomycin. She still has intermittent nausea, occurring several days per week, requests zofran to take as needed which has helped in the past.  Takes her medications with food. Reports still having 5-6 loose stools per day which mostly occur after meals. No rectal bleeding. Last labs in 6/2021.      3/10/2021  Since her last visit, she reports an overall improvement in her illness. She feels better but still has 5 stools per day which are described as small soft pieces of stool or sometimes loose in consistency. She has intermittent bright red rectal bleeding with 1-2 stools intermittently. Blood is described as mixed in with stool but not large in amount. This occurs about 1 time per week over the past 1.5 months. She is only taking Apriso 4 caps in the morning daily, never started azathioprine as directed because she was confused as to why she should take it and worried about the side effects. Complains of current fatigue and cold intolerance. Is not currently taking iron supplements but has a history of TOMI. She has rare left sided flank pain which is mild to moderate when it occurs. Her new diagnosis of ulcerative colitis was made after colonoscopy was completed in 11/2020.      11/10/2020  She states that she noted to have red blood in stool three weeks ago and that got worse with loose stool.She also develeloped LLQ abdominal pain. Loose stool 5-7 times daily. Deny any fever chills. Patient was in emergency room 2 weeks ago with complaints of diarrhea abdominal pain and blood in the stool.  She had basic lab work done which revealed borderline leukocytosis.  She deny any prior constipation. She also had a CT scan of the abdomen pelvis done which revealed thickening in the descending colon the sigmoid suggesting colitis. She was given Augmentin for ten days.    Weight is stable. Pt denies nausea vomiting or odynophagia or dysphagia. There is no history of  acid reflux. There is no history of anemia. No prior history of EGD or colonoscopy. No family history of colon cancer or any GI malignancy, or IBD. No history of any abdominal surgery. Denies alcohol abuse or cigarette smoking.    Subjective      Past Medical History:   Past Medical History:   Diagnosis Date   • Abnormal ECG     tachycardia   • Bipolar affective (HCC)    • Diverticulitis    • Multiple body piercings     ears x 5   • Palpitations    • Tattoos 11/12/2020    x's 3   • Tattoos    • Ulcerative colitis (HCC)      Past Surgical History:   Past Surgical History:   Procedure Laterality Date   • COLONOSCOPY N/A 11/17/2020    Procedure: COLONOSCOPY WITH BIOPSIES;  Surgeon: Susan Brizuela MD;  Location: [facility] ENDOSCOPY;  Service: Gastroenterology;  Laterality: N/A;   • COLONOSCOPY N/A 9/17/2021    Procedure: COLONOSCOPY with biopsy,  polypectomy, endoscopic with hot snare polypectomy mucosal resection with injection of normal saline soft colaguation with clip placement x 3;  Surgeon: Susan Brizuela MD;  Location: [facility] ENDOSCOPY;  Service: Gastroenterology;  Laterality: N/A;   • ENDOSCOPY N/A 8/2/2021    Procedure: ESOPHAGOGASTRODUODENOSCOPY WITH BIOPSY ;  Surgeon: Susan Brizuela MD;  Location: [facility] ENDOSCOPY;  Service: Gastroenterology;  Laterality: N/A;   • WISDOM TOOTH EXTRACTION         Family History:   Family History   Problem Relation Age of Onset   • Hyperlipidemia Mother    • Hyperlipidemia Father    • Hypertension Father    • Stroke Brother    • Heart attack Maternal Grandfather    • Heart disease Maternal Grandfather    • Heart disease Paternal Grandfather    • Heart attack Paternal Grandfather    • Hyperlipidemia Brother    • Alcohol abuse Other    • Diabetes Other    • Hypertension Other    • Stroke Other    • Colon cancer Neg Hx    • Cirrhosis Neg Hx    • Liver cancer Neg Hx    • Liver disease Neg Hx        Social History:   Social History     Socioeconomic History   •  Marital status:    Tobacco Use   • Smoking status: Former Smoker     Packs/day: 1.00     Types: Cigarettes     Quit date: 2019     Years since quittin.6   • Smokeless tobacco: Never Used   Vaping Use   • Vaping Use: Some days   • Last attempt to quit: 3/18/2020   • Substances: Nicotine, Flavoring   • Devices: Pre-filled or refillable cartridge, Refillable tank   Substance and Sexual Activity   • Alcohol use: Yes     Comment: socially   • Drug use: No   • Sexual activity: Defer       Medications:     Current Outpatient Medications:   •  Adalimumab (Humira Pen) 40 MG/0.8ML Pen-injector Kit, Inject 40 mg under the skin into the appropriate area as directed Every 14 (Fourteen) Days. Maintenance: Day 29 inject 40 mg, then inject 40 mg every 2 weeks, Disp: 2 each, Rfl: 11  •  Adalimumab (HUMIRA SC), Inject  under the skin into the appropriate area as directed., Disp: , Rfl:   •  azaTHIOprine (IMURAN) 50 MG tablet, Take 2 tablets by mouth Daily., Disp: 60 tablet, Rfl: 5  •  cetirizine (zyrTEC) 10 MG tablet, Take 10 mg by mouth Daily., Disp: , Rfl:   •  ferrous sulfate (FeroSul) 325 (65 FE) MG tablet, Take 1 tablet by mouth Daily With Breakfast., Disp: 30 tablet, Rfl: 3  •  mesalamine (APRISO) 0.375 g 24 hr capsule, Take 4 capsules by mouth Daily., Disp: 120 capsule, Rfl: 5  •  ondansetron ODT (ZOFRAN-ODT) 4 MG disintegrating tablet, Place 1 tablet on the tongue Every 8 (Eight) Hours As Needed for Nausea or Vomiting., Disp: 42 tablet, Rfl: 2  •  Pediatric Multivitamins-Fl (Multiple Vitamins/Fluoride) 1 MG chewable tablet, Chew 1 capsule Daily., Disp: , Rfl:   •  Probiotic Product (Align) chewable tablet, Chew., Disp: , Rfl:   •  Probiotic Product (PROBIOTIC DAILY PO), Take 1 capsule by mouth Daily., Disp: , Rfl:     Allergies:   Allergies   Allergen Reactions   • Augmentin [Amoxicillin-Pot Clavulanate] Diarrhea and GI Intolerance       Review of Systems:   Review of Systems   Constitutional: Negative for  "appetite change, fatigue, fever and unexpected weight loss.   HENT: Negative for trouble swallowing.    Gastrointestinal: Negative for abdominal distention, abdominal pain, anal bleeding, blood in stool, constipation, diarrhea, nausea, rectal pain, vomiting, GERD and indigestion.       The following portions of the patient's history were reviewed and updated as appropriate: allergies, current medications, past family history, past medical history, past social history, past surgical history and problem list.    Objective     Physical Exam:  Vital Signs:   Vitals:    01/04/22 1609   BP: 110/80   Temp: 97.7 °F (36.5 °C)   TempSrc: Infrared   Weight: 67.6 kg (149 lb)   Height: 162.6 cm (64\")       Physical Exam  Constitutional:       Appearance: Normal appearance.   HENT:      Head: Normocephalic and atraumatic.   Eyes:      Conjunctiva/sclera: Conjunctivae normal.   Abdominal:      General: Abdomen is flat. There is no distension.      Palpations: There is no mass.      Tenderness: There is no abdominal tenderness. There is no guarding or rebound.      Hernia: No hernia is present.   Musculoskeletal:      Cervical back: Normal range of motion and neck supple.   Neurological:      Mental Status: She is alert.         Results Review:   I reviewed the patient's new clinical results.    Lab on 11/18/2021   Component Date Value Ref Range Status   • Glucose 11/18/2021 88  65 - 99 mg/dL Final   • BUN 11/18/2021 7  6 - 20 mg/dL Final   • Creatinine 11/18/2021 0.74  0.57 - 1.00 mg/dL Final   • Sodium 11/18/2021 142  136 - 145 mmol/L Final   • Potassium 11/18/2021 3.8  3.5 - 5.2 mmol/L Final   • Chloride 11/18/2021 103  98 - 107 mmol/L Final   • CO2 11/18/2021 29.8* 22.0 - 29.0 mmol/L Final   • Calcium 11/18/2021 9.6  8.6 - 10.5 mg/dL Final   • Total Protein 11/18/2021 7.6  6.0 - 8.5 g/dL Final   • Albumin 11/18/2021 4.70  3.50 - 5.20 g/dL Final   • ALT (SGPT) 11/18/2021 5  1 - 33 U/L Final   • AST (SGOT) 11/18/2021 20  1 - 32 " U/L Final   • Alkaline Phosphatase 11/18/2021 111  39 - 117 U/L Final   • Total Bilirubin 11/18/2021 <0.2  0.0 - 1.2 mg/dL Final   • eGFR Non  Amer 11/18/2021 85  >60 mL/min/1.73 Final   • Globulin 11/18/2021 2.9  gm/dL Final   • A/G Ratio 11/18/2021 1.6  g/dL Final   • BUN/Creatinine Ratio 11/18/2021 9.5  7.0 - 25.0 Final   • Anion Gap 11/18/2021 9.2  5.0 - 15.0 mmol/L Final   • WBC 11/18/2021 7.84  3.40 - 10.80 10*3/mm3 Final   • RBC 11/18/2021 3.91  3.77 - 5.28 10*6/mm3 Final   • Hemoglobin 11/18/2021 11.7* 12.0 - 15.9 g/dL Final   • Hematocrit 11/18/2021 35.7  34.0 - 46.6 % Final   • MCV 11/18/2021 91.3  79.0 - 97.0 fL Final   • MCH 11/18/2021 29.9  26.6 - 33.0 pg Final   • MCHC 11/18/2021 32.8  31.5 - 35.7 g/dL Final   • RDW 11/18/2021 15.8* 12.3 - 15.4 % Final   • RDW-SD 11/18/2021 52.5  37.0 - 54.0 fl Final   • MPV 11/18/2021 11.3  6.0 - 12.0 fL Final   • Platelets 11/18/2021 363  140 - 450 10*3/mm3 Final   • Neutrophil % 11/18/2021 65.2  42.7 - 76.0 % Final   • Lymphocyte % 11/18/2021 23.5  19.6 - 45.3 % Final   • Monocyte % 11/18/2021 6.6  5.0 - 12.0 % Final   • Eosinophil % 11/18/2021 3.7  0.3 - 6.2 % Final   • Basophil % 11/18/2021 0.6  0.0 - 1.5 % Final   • Immature Grans % 11/18/2021 0.4  0.0 - 0.5 % Final   • Neutrophils, Absolute 11/18/2021 5.11  1.70 - 7.00 10*3/mm3 Final   • Lymphocytes, Absolute 11/18/2021 1.84  0.70 - 3.10 10*3/mm3 Final   • Monocytes, Absolute 11/18/2021 0.52  0.10 - 0.90 10*3/mm3 Final   • Eosinophils, Absolute 11/18/2021 0.29  0.00 - 0.40 10*3/mm3 Final   • Basophils, Absolute 11/18/2021 0.05  0.00 - 0.20 10*3/mm3 Final   • Immature Grans, Absolute 11/18/2021 0.03  0.00 - 0.05 10*3/mm3 Final   • nRBC 11/18/2021 0.0  0.0 - 0.2 /100 WBC Final      No radiology results for the last 90 days.    Assessment / Plan      1. Ulcerative pancolitis   2.  History of C. difficile colitis  3.  Iron deficiency anemia  1/4 2022  Patient appears to be in clinical remission.  Her lab work  done in November 2021 reviewed.  Her hemoglobin improved 11.7 g/dL.  Rest of the lab works were unremarkable.  She had a induction dose of Humira and is due for her first maintenance dose of Humira tomorrow on 1/5/2022.  For now we will continue her Imuran and the Apriso.  We will plan for discontinuing Apriso in 3 to 6 months time when once she has a good therapeutic Humira level.  We will continue her multivitamins and iron pills for now.   We will get a CBC CMP stool calprotectin and Humira level with antibodies day before her fourth maintenance shot on 4 March 2022  I have also advised her to get an appointment with dermatology for follow-up for skin cancer screening while on Imuran.    11/18/2021  Patient appears to be in clinical remission now with a 2-3 soft bowel movement without any abdominal pain or blood in stool.  She finished her course of tapering dose of steroids.  She is still awaiting her first induction dose of Humira.  She does have a iron deficiency anemia associated with IBD and lower GI bleed from ulcerations.  She also has mild nausea associated with mesalamine.  Her last episode of C. difficile colitis was in July 2021  We will continue her Apriso and Imuran for now.  We will consider discontinuing her mesalamine 6 months after Humira shot started.   CBC CMP now.  We will get a stool calprotectin in next 3 months time.  We will continue her iron pills for now.      09/13/21  She continues to have 5-6 loose stools daily. It does not seem that her ulcerative colitis is controlled with current regimen of apriso 4 capsules daily plus imuran 50 mg 2 tablets daily. Will likely need step up therapy with biologics. She has already had negative TB screen and hepatitis screening. She will repeat fecal calprotectin now. Colonoscopy will be arranged urgently so that we can change therapy to better manage her IBD. Her last colonoscopy showed severe ulcerative colitis from rectum to hepatic flexure in  11/2020. Luu score was scored after the endoscopy and endoscopic findings.  Total Luu score for ulcerative colitis severity was 9 ( stool pattern 3 points, rectal bleeding 0 points, endoscopic findings 3 points,  global assessment 3 points)-      She will have a colonoscopy performed with monitored anesthesia care. The indications, technique, alternatives and potential risk and complications were discussed with the patient including but not limited to bleeding, bowel perforations, missing lesions and anesthetic complications. The patient understands and wishes to proceed with the procedure and has given their verbal consent. Written patient education information was given to the patient. She should follow up in the office after this procedure to discuss the results and further recommendations can be made at that time. The patient will call if they have further questions before procedure.        7/27/2021  She is feeling better now than she has in months. She has continued medications for treatment of UC as directed. Will continue apriso 4 capsules every morning plus azathioprine 50 mg 2 tablets PO daily. She already had COVID injection (sergio and sergio) Reviewed previous lab results with her in detail today. CRP was significantly elevated but she also had positive C diff at that time. Stool frequency is still more than preferred with 5 stools daily which are loose. No current rectal bleeding and no abdominal pain. Labs today including CBC, CMP, CRP and iron profile.      6/9/2021  Her symptoms of the ulcerative colitis are currently not controlled. She is still having 7 days stools daily, rectal bleeding and left-sided abdominal pain. She has been taking azathioprine 50 mg 2 tablets once daily plus mesalamine (Apriso) 4 capsules once daily exactly as directed. She will complete lab testing today including CBC, CMP, iron profile and C-reactive protein. She will also have stool testing to rule out any infection  including C. difficile and GI panel. Stool calprotectin will gauge bowel inflammation. Her previous calprotectin was elevated at 384 prior to starting the azathioprine.  She may need step up in therapy for treatment of her UC.     3/10/2021  She is feeling better with minimal abdominal pain but still having loose stools and intermittent rectal bleeding. She is taking mesalamine (Apriso) but did not start previously recommended azathioprine (Imuran). For now, I have asked her to complete basic labs as well as iron profile, CRP, B12, vit D and calprotectin stool. Results will be reviewed and depending on those results, may hold off on starting Imuran due to patient preference.   She has already had screening labs needed before starting immunosuppressive therapy.   Reminded her that it is recommended that she get covid vaccination.      12/9/2020   she had a colonoscopy done on 11/17/2020 which revealed extensive diffuse colitis involving the colon up to the hepatic flexure from the rectum.  Dispersed inflammation noted from hyperflexion to the cecum.  Anaspaz normal.  Examined terminal ileum for about 15 cm from IC valve was normal. Pathology revealed acute severe colitis with crypt abscess.  Patient had chronic diarrhea more than 2 months. These findings are more in favor of ulcerative colitis. Recent TB Gold test was negative.  Chronic hepatitis panel was negative for any chronic hep C or hep B infection. She is currently on a tapering dose of steroids. She would definitely benefit from Biologics however at this time patient's insurance does not cover Remicade or the Humira or newer Biologics. We will start her on Apriso along with the Imuran and see how she does with that regimen.   If no significant improvement with that for the next 6 months to 12 months we will consider Biologics and get a prior authorization  Started on Apriso 0.375 g p.o. 4 capsules daily. We will add Imuran 100 mg p.o. daily when once a TPMT  genetics available  She is immune to hepatitis A but not to hepatitis B.  She needs a hep B vaccine as outpatient with the booster  Advised flu vaccine and COVID-19 vaccine     11/10/2020  Acute episode of bloody diarrhea, which has progressed now to chronic.  Symptoms more suggestive infective colitis however IBD cannot be ruled out. She states that she finished 10-day course of Augmentin given at the ED but she still has ongoing bloody diarrhea with minimal left lower quadrant abdominal pain. Her basic lab work done in the emergency room 2 weeks ago did not reveal any significant abnormality except borderline leukocytosis.  CT of the abdomen pelvis done revealed a thickened descending colon and the sigmoid colon suggesting colitis.  No family history of any IBD. Given her persistent symptoms we will schedule her for colonoscopy for further evaluation  We will also get a stool for C. difficile and the stool for GI panel          Follow Up:   No follow-ups on file.    Susan Brizuela MD  [facility]  1/4/2022  16:13 EST     Please note that portions of this note may have been completed with a voice recognition program.

## 2022-01-26 ENCOUNTER — HOSPITAL ENCOUNTER (OUTPATIENT)
Dept: MAMMOGRAPHY | Facility: HOSPITAL | Age: 47
Discharge: HOME | End: 2022-01-26
Admitting: PHYSICIAN ASSISTANT

## 2022-01-26 DIAGNOSIS — Z12.31 BREAST CANCER SCREENING BY MAMMOGRAM: ICD-10-CM

## 2022-01-26 PROCEDURE — 77063 BREAST TOMOSYNTHESIS BI: CPT

## 2022-01-26 PROCEDURE — 77067 SCR MAMMO BI INCL CAD: CPT

## 2022-03-02 ENCOUNTER — LAB (OUTPATIENT)
Dept: LAB | Facility: HOSPITAL | Age: 47
End: 2022-03-02

## 2022-03-02 DIAGNOSIS — K51.00 ULCERATIVE PANCOLITIS: ICD-10-CM

## 2022-03-02 LAB
ALBUMIN SERPL-MCNC: 4.8 G/DL (ref 3.5–5.2)
ALBUMIN/GLOB SERPL: 1.9 G/DL
ALP SERPL-CCNC: 101 U/L (ref 39–117)
ALT SERPL W P-5'-P-CCNC: 17 U/L (ref 1–33)
ANION GAP SERPL CALCULATED.3IONS-SCNC: 10.9 MMOL/L (ref 5–15)
AST SERPL-CCNC: 19 U/L (ref 1–32)
BASOPHILS # BLD AUTO: 0.03 10*3/MM3 (ref 0–0.2)
BASOPHILS NFR BLD AUTO: 0.6 % (ref 0–1.5)
BILIRUB SERPL-MCNC: 0.2 MG/DL (ref 0–1.2)
BUN SERPL-MCNC: 11 MG/DL (ref 6–20)
BUN/CREAT SERPL: 13.6 (ref 7–25)
CALCIUM SPEC-SCNC: 9.5 MG/DL (ref 8.6–10.5)
CHLORIDE SERPL-SCNC: 103 MMOL/L (ref 98–107)
CO2 SERPL-SCNC: 26.1 MMOL/L (ref 22–29)
CREAT SERPL-MCNC: 0.81 MG/DL (ref 0.57–1)
DEPRECATED RDW RBC AUTO: 45.3 FL (ref 37–54)
EGFRCR SERPLBLD CKD-EPI 2021: 91.9 ML/MIN/1.73
EOSINOPHIL # BLD AUTO: 0.02 10*3/MM3 (ref 0–0.4)
EOSINOPHIL NFR BLD AUTO: 0.4 % (ref 0.3–6.2)
ERYTHROCYTE [DISTWIDTH] IN BLOOD BY AUTOMATED COUNT: 13.4 % (ref 12.3–15.4)
GLOBULIN UR ELPH-MCNC: 2.5 GM/DL
GLUCOSE SERPL-MCNC: 92 MG/DL (ref 65–99)
HCT VFR BLD AUTO: 37.9 % (ref 34–46.6)
HGB BLD-MCNC: 12.9 G/DL (ref 12–15.9)
IMM GRANULOCYTES # BLD AUTO: 0.01 10*3/MM3 (ref 0–0.05)
IMM GRANULOCYTES NFR BLD AUTO: 0.2 % (ref 0–0.5)
LYMPHOCYTES # BLD AUTO: 1.45 10*3/MM3 (ref 0.7–3.1)
LYMPHOCYTES NFR BLD AUTO: 30.3 % (ref 19.6–45.3)
MCH RBC QN AUTO: 31.9 PG (ref 26.6–33)
MCHC RBC AUTO-ENTMCNC: 34 G/DL (ref 31.5–35.7)
MCV RBC AUTO: 93.8 FL (ref 79–97)
MONOCYTES # BLD AUTO: 0.35 10*3/MM3 (ref 0.1–0.9)
MONOCYTES NFR BLD AUTO: 7.3 % (ref 5–12)
NEUTROPHILS NFR BLD AUTO: 2.92 10*3/MM3 (ref 1.7–7)
NEUTROPHILS NFR BLD AUTO: 61.2 % (ref 42.7–76)
NRBC BLD AUTO-RTO: 0 /100 WBC (ref 0–0.2)
PLATELET # BLD AUTO: 267 10*3/MM3 (ref 140–450)
PMV BLD AUTO: 11.7 FL (ref 6–12)
POTASSIUM SERPL-SCNC: 3.9 MMOL/L (ref 3.5–5.2)
PROT SERPL-MCNC: 7.3 G/DL (ref 6–8.5)
RBC # BLD AUTO: 4.04 10*6/MM3 (ref 3.77–5.28)
SODIUM SERPL-SCNC: 140 MMOL/L (ref 136–145)
WBC NRBC COR # BLD: 4.78 10*3/MM3 (ref 3.4–10.8)

## 2022-03-02 PROCEDURE — 80053 COMPREHEN METABOLIC PANEL: CPT

## 2022-03-02 PROCEDURE — 36415 COLL VENOUS BLD VENIPUNCTURE: CPT

## 2022-03-02 PROCEDURE — 85025 COMPLETE CBC W/AUTO DIFF WBC: CPT

## 2022-03-02 PROCEDURE — 80145 DRUG ASSAY ADALIMUMAB: CPT

## 2022-03-02 PROCEDURE — 80299 QUANTITATIVE ASSAY DRUG: CPT

## 2022-03-03 PROCEDURE — 83993 ASSAY FOR CALPROTECTIN FECAL: CPT

## 2022-03-07 LAB — CALPROTECTIN STL-MCNT: 97 UG/G (ref 0–120)

## 2022-03-09 DIAGNOSIS — K51.00 ULCERATIVE PANCOLITIS: ICD-10-CM

## 2022-03-09 DIAGNOSIS — E61.1 IRON DEFICIENCY: ICD-10-CM

## 2022-03-10 LAB
ADALIMUMAB AB SERPL-MCNC: <25 NG/ML
ADALIMUMAB SERPL-MCNC: 2.4 UG/ML

## 2022-03-10 RX ORDER — AZATHIOPRINE 50 MG/1
100 TABLET ORAL DAILY
Qty: 60 TABLET | Refills: 5 | Status: SHIPPED | OUTPATIENT
Start: 2022-03-10 | End: 2022-09-06 | Stop reason: SDUPTHER

## 2022-03-10 RX ORDER — FERROUS SULFATE 325(65) MG
1 TABLET ORAL
Qty: 30 TABLET | Refills: 3 | Status: SHIPPED | OUTPATIENT
Start: 2022-03-10 | End: 2022-07-14

## 2022-03-14 DIAGNOSIS — K51.00 ULCERATIVE PANCOLITIS: ICD-10-CM

## 2022-03-14 RX ORDER — ADALIMUMAB 40MG/0.8ML
40 KIT SUBCUTANEOUS
Qty: 4 EACH | Refills: 11 | Status: SHIPPED | OUTPATIENT
Start: 2022-03-14 | End: 2023-02-28

## 2022-04-12 ENCOUNTER — OFFICE VISIT (OUTPATIENT)
Dept: GASTROENTEROLOGY | Facility: CLINIC | Age: 47
End: 2022-04-12

## 2022-04-12 VITALS
TEMPERATURE: 97.5 F | HEIGHT: 64 IN | BODY MASS INDEX: 25.95 KG/M2 | HEART RATE: 83 BPM | DIASTOLIC BLOOD PRESSURE: 74 MMHG | WEIGHT: 152 LBS | SYSTOLIC BLOOD PRESSURE: 116 MMHG

## 2022-04-12 DIAGNOSIS — K51.00 ULCERATIVE PANCOLITIS: Primary | ICD-10-CM

## 2022-04-12 DIAGNOSIS — D50.0 IRON DEFICIENCY ANEMIA DUE TO CHRONIC BLOOD LOSS: ICD-10-CM

## 2022-04-12 PROCEDURE — 99213 OFFICE O/P EST LOW 20 MIN: CPT | Performed by: INTERNAL MEDICINE

## 2022-04-12 RX ORDER — ROSUVASTATIN CALCIUM 20 MG/1
20 TABLET, COATED ORAL EVERY OTHER DAY
COMMUNITY
Start: 2022-01-28 | End: 2022-07-14

## 2022-04-12 NOTE — PROGRESS NOTES
"     Follow Up Note     Date: 2022   Patient Name: Stacy Guardado  MRN: [id number]  : 1975     Referring Physician: Shayna Swartz PA    Chief Complaint:    Chief Complaint   Patient presents with   • Follow-up   • Ulcerative Colitis       Interval History:   2022  Stacy Guardado is a 46 y.o. female who is here today for follow up for her ulcerative colitis.  She states that she is much better now after she was started on Humira.  Has been having 1-2 soft bowel movement without any abdominal pain.  Gained weight.  She is here for follow-up after recent blood work.    2022  Stacy Guardado is a 46 y.o. female who is here today for follow up for ulcerative colitis.  She is doing very well with 1 or 2 soft formed bowel movement now without any blood in the stool.  No significant abdominal pain or nausea.  She had a induction dose of Humira and due for her first maintenance subcu shot tomorrow on 2021  Stacy Guardado is a 46 y.o. female who is here today for follow up for her ulcerative colitis.  She is feeling much better now that he has resolved she has been having bowel movement anywhere 2 to 3/day soft without any blood without any significant abdominal pain.  Her nausea also has improved.     2021  Stacy Guardado is a 46 y.o. female who is here today for follow up regarding recent EGD and Ulcerative Colitis.     She continues to have 5-6 loose stools daily with fecal urgency. Nausea is still present daily. She has fatigue. Still taking iron supplement daily. Taking Apriso 4 capsules each morning plus imuran 50 mg 2 tablets daily for treatment of UC. She had EGD recently and would like to discuss those results. Rectal bleeding has been intermittent over the past several months but recently has been present for weeks. She has bright red blood per rectum and feels \"raw\" in the rectal area.     2021  Feeling dramatically better " overall. Still taking probiotic and iron supplement daily. Still taking Apriso 4 capsules in the morning plus Imuran 50 mg 2 tablets daily for UC. She took antibiotics (vancomycin) as prescribed for treatment of c diff found on last stool testing. She had improvement in foul smelling stools and abdominal pain about half way through treatment with vancomycin. She still has intermittent nausea, occurring several days per week, requests zofran to take as needed which has helped in the past.  Takes her medications with food. Reports still having 5-6 loose stools per day which mostly occur after meals. No rectal bleeding. Last labs in 6/2021.      3/10/2021  Since her last visit, she reports an overall improvement in her illness. She feels better but still has 5 stools per day which are described as small soft pieces of stool or sometimes loose in consistency. She has intermittent bright red rectal bleeding with 1-2 stools intermittently. Blood is described as mixed in with stool but not large in amount. This occurs about 1 time per week over the past 1.5 months. She is only taking Apriso 4 caps in the morning daily, never started azathioprine as directed because she was confused as to why she should take it and worried about the side effects. Complains of current fatigue and cold intolerance. Is not currently taking iron supplements but has a history of TOMI. She has rare left sided flank pain which is mild to moderate when it occurs. Her new diagnosis of ulcerative colitis was made after colonoscopy was completed in 11/2020.      11/10/2020  She states that she noted to have red blood in stool three weeks ago and that got worse with loose stool.She also develeloped LLQ abdominal pain. Loose stool 5-7 times daily. Deny any fever chills. Patient was in emergency room 2 weeks ago with complaints of diarrhea abdominal pain and blood in the stool.  She had basic lab work done which revealed borderline leukocytosis.  She deny  any prior constipation. She also had a CT scan of the abdomen pelvis done which revealed thickening in the descending colon the sigmoid suggesting colitis. She was given Augmentin for ten days.    Weight is stable. Pt denies nausea vomiting or odynophagia or dysphagia. There is no history of acid reflux. There is no history of anemia. No prior history of EGD or colonoscopy. No family history of colon cancer or any GI malignancy, or IBD. No history of any abdominal surgery. Denies alcohol abuse or cigarette smoking.    Subjective      Past Medical History:   Past Medical History:   Diagnosis Date   • Abnormal ECG     tachycardia   • Bipolar affective (HCC)    • Diverticulitis    • Multiple body piercings     ears x 5   • Palpitations    • Tattoos 11/12/2020    x's 3   • Tattoos    • Ulcerative colitis (HCC)      Past Surgical History:   Past Surgical History:   Procedure Laterality Date   • COLONOSCOPY N/A 11/17/2020    Procedure: COLONOSCOPY WITH BIOPSIES;  Surgeon: Susan Brizuela MD;  Location: [facility] ENDOSCOPY;  Service: Gastroenterology;  Laterality: N/A;   • COLONOSCOPY N/A 9/17/2021    Procedure: COLONOSCOPY with biopsy,  polypectomy, endoscopic with hot snare polypectomy mucosal resection with injection of normal saline soft colaguation with clip placement x 3;  Surgeon: Susan Brizuela MD;  Location: [facility] ENDOSCOPY;  Service: Gastroenterology;  Laterality: N/A;   • ENDOSCOPY N/A 8/2/2021    Procedure: ESOPHAGOGASTRODUODENOSCOPY WITH BIOPSY ;  Surgeon: Susan Brizuela MD;  Location: [facility] ENDOSCOPY;  Service: Gastroenterology;  Laterality: N/A;   • WISDOM TOOTH EXTRACTION         Family History:   Family History   Problem Relation Age of Onset   • Hyperlipidemia Mother    • Hyperlipidemia Father    • Hypertension Father    • Stroke Brother    • Heart attack Maternal Grandfather    • Heart disease Maternal Grandfather    • Heart disease Paternal Grandfather    • Heart attack Paternal  Grandfather    • Hyperlipidemia Brother    • Alcohol abuse Other    • Diabetes Other    • Hypertension Other    • Stroke Other    • Colon cancer Neg Hx    • Cirrhosis Neg Hx    • Liver cancer Neg Hx    • Liver disease Neg Hx    • Breast cancer Neg Hx        Social History:   Social History     Socioeconomic History   • Marital status:    Tobacco Use   • Smoking status: Former Smoker     Packs/day: 1.00     Types: Cigarettes     Quit date: 2019     Years since quittin.9   • Smokeless tobacco: Never Used   Vaping Use   • Vaping Use: Some days   • Last attempt to quit: 3/18/2020   • Substances: Nicotine, Flavoring   • Devices: Pre-filled or refillable cartridge, Refillable tank   Substance and Sexual Activity   • Alcohol use: Yes     Comment: socially   • Drug use: No   • Sexual activity: Defer       Medications:     Current Outpatient Medications:   •  Adalimumab (Humira Pen) 40 MG/0.8ML Pen-injector Kit, Inject 40 mg under the skin into the appropriate area as directed Every 7 (Seven) Days., Disp: 4 each, Rfl: 11  •  azaTHIOprine (IMURAN) 50 MG tablet, Take 2 tablets by mouth Daily., Disp: 60 tablet, Rfl: 5  •  cetirizine (zyrTEC) 10 MG tablet, Take 10 mg by mouth Daily., Disp: , Rfl:   •  ferrous sulfate (FeroSul) 325 (65 FE) MG tablet, Take 1 tablet by mouth Daily With Breakfast., Disp: 30 tablet, Rfl: 3  •  mesalamine (APRISO) 0.375 g 24 hr capsule, Take 4 capsules by mouth Daily., Disp: 120 capsule, Rfl: 5  •  Pediatric Multivitamins-Fl (Multiple Vitamins/Fluoride) 1 MG chewable tablet, Chew 1 capsule Daily., Disp: , Rfl:   •  rosuvastatin (CRESTOR) 20 MG tablet, Take 20 mg by mouth Every Other Day., Disp: , Rfl:     Allergies:   Allergies   Allergen Reactions   • Augmentin [Amoxicillin-Pot Clavulanate] Diarrhea and GI Intolerance       Review of Systems:   Review of Systems   Constitutional: Negative for appetite change, fatigue, fever and unexpected weight loss.   HENT: Negative for trouble  "swallowing.    Gastrointestinal: Negative for abdominal distention, abdominal pain, anal bleeding, blood in stool, constipation, diarrhea, nausea, rectal pain, vomiting, GERD and indigestion.       The following portions of the patient's history were reviewed and updated as appropriate: allergies, current medications, past family history, past medical history, past social history, past surgical history and problem list.    Objective     Physical Exam:  Vital Signs:   Vitals:    04/12/22 1651   BP: 116/74   Pulse: 83   Temp: 97.5 °F (36.4 °C)   TempSrc: Infrared   Weight: 68.9 kg (152 lb)   Height: 162.6 cm (64\")       Physical Exam  Constitutional:       Appearance: Normal appearance.   HENT:      Head: Normocephalic and atraumatic.   Eyes:      Conjunctiva/sclera: Conjunctivae normal.   Abdominal:      General: Abdomen is flat. There is no distension.      Palpations: There is no mass.      Tenderness: There is no abdominal tenderness. There is no guarding or rebound.      Hernia: No hernia is present.   Musculoskeletal:      Cervical back: Normal range of motion and neck supple.   Neurological:      Mental Status: She is alert.         Results Review:   I reviewed the patient's new clinical results.    Lab on 03/02/2022   Component Date Value Ref Range Status   • Glucose 03/02/2022 92  65 - 99 mg/dL Final   • BUN 03/02/2022 11  6 - 20 mg/dL Final   • Creatinine 03/02/2022 0.81  0.57 - 1.00 mg/dL Final   • Sodium 03/02/2022 140  136 - 145 mmol/L Final   • Potassium 03/02/2022 3.9  3.5 - 5.2 mmol/L Final   • Chloride 03/02/2022 103  98 - 107 mmol/L Final   • CO2 03/02/2022 26.1  22.0 - 29.0 mmol/L Final   • Calcium 03/02/2022 9.5  8.6 - 10.5 mg/dL Final   • Total Protein 03/02/2022 7.3  6.0 - 8.5 g/dL Final   • Albumin 03/02/2022 4.80  3.50 - 5.20 g/dL Final   • ALT (SGPT) 03/02/2022 17  1 - 33 U/L Final   • AST (SGOT) 03/02/2022 19  1 - 32 U/L Final   • Alkaline Phosphatase 03/02/2022 101  39 - 117 U/L Final   • " Total Bilirubin 03/02/2022 0.2  0.0 - 1.2 mg/dL Final   • Globulin 03/02/2022 2.5  gm/dL Final   • A/G Ratio 03/02/2022 1.9  g/dL Final   • BUN/Creatinine Ratio 03/02/2022 13.6  7.0 - 25.0 Final   • Anion Gap 03/02/2022 10.9  5.0 - 15.0 mmol/L Final   • eGFR 03/02/2022 91.9  >60.0 mL/min/1.73 Final    National Kidney Foundation and American Society of Nephrology (ASN) Task Force recommended calculation based on the Chronic Kidney Disease Epidemiology Collaboration (CKD-EPI) equation refit without adjustment for race.   • WBC 03/02/2022 4.78  3.40 - 10.80 10*3/mm3 Final   • RBC 03/02/2022 4.04  3.77 - 5.28 10*6/mm3 Final   • Hemoglobin 03/02/2022 12.9  12.0 - 15.9 g/dL Final   • Hematocrit 03/02/2022 37.9  34.0 - 46.6 % Final   • MCV 03/02/2022 93.8  79.0 - 97.0 fL Final   • MCH 03/02/2022 31.9  26.6 - 33.0 pg Final   • MCHC 03/02/2022 34.0  31.5 - 35.7 g/dL Final   • RDW 03/02/2022 13.4  12.3 - 15.4 % Final   • RDW-SD 03/02/2022 45.3  37.0 - 54.0 fl Final   • MPV 03/02/2022 11.7  6.0 - 12.0 fL Final   • Platelets 03/02/2022 267  140 - 450 10*3/mm3 Final   • Neutrophil % 03/02/2022 61.2  42.7 - 76.0 % Final   • Lymphocyte % 03/02/2022 30.3  19.6 - 45.3 % Final   • Monocyte % 03/02/2022 7.3  5.0 - 12.0 % Final   • Eosinophil % 03/02/2022 0.4  0.3 - 6.2 % Final   • Basophil % 03/02/2022 0.6  0.0 - 1.5 % Final   • Immature Grans % 03/02/2022 0.2  0.0 - 0.5 % Final   • Neutrophils, Absolute 03/02/2022 2.92  1.70 - 7.00 10*3/mm3 Final   • Lymphocytes, Absolute 03/02/2022 1.45  0.70 - 3.10 10*3/mm3 Final   • Monocytes, Absolute 03/02/2022 0.35  0.10 - 0.90 10*3/mm3 Final   • Eosinophils, Absolute 03/02/2022 0.02  0.00 - 0.40 10*3/mm3 Final   • Basophils, Absolute 03/02/2022 0.03  0.00 - 0.20 10*3/mm3 Final   • Immature Grans, Absolute 03/02/2022 0.01  0.00 - 0.05 10*3/mm3 Final   • nRBC 03/02/2022 0.0  0.0 - 0.2 /100 WBC Final   • Adalimumab Drug Level 03/02/2022 2.4  ug/mL Final    Quantitation Limit: <0.6 ug/mL  Results  of 0.6 or higher indicate detection of adalimumab.  Comments:       - The optimal drug concentration depends upon patient-         specific factors including the disease and desired         therapeutic endpoint.       - Maintenance trough concentrations >=7.5 may         correspond to higher remission rates.(1)       - Mucosal healing may be more likely in patients with         maintenance trough levels >8.14.(2)       - In rheumatoid arthritis, trough levels of 5-8 are         associated with clinical (EULAR) response.(3)       - This assay measures the antibody-unbound (free)         fraction of adalimumab when serum anti-adalimumab         antibodies are present.   • Adalimumab Antibody 03/02/2022 <25  ng/mL Final    Comment:     Interpretation:      The above result is an UNDETECTED Antibody titer      Quantitation Limit: <25 ng/mL.      Results of 25 or higher indicate detection of anti-      adalimumab antibodies.      25 - 100 ng/mL: LOW titer      101 - 300 ng/mL: INTERMEDIATE titer      301 or greater ng/mL: HIGH titer  Comments:       - Anti-drug antibody levels should be interpreted in         the context of the concomitant free drug trough         concentration.       - Low anti-drug antibodies may be transient while high         titers are likely to be more consequential.(4-6)       - Some immunogenicity is reversible. Elimination of         intermediate titer (and even some high titer)         anti-adalimumab antibodies has been achieved with         dose escalation and/or methotrexate or 6-MP.(7)       - This anti-adalimumab antibody assay is drug tolerant,         and all positive results are verified for anti-drug         specificity by a confirmatory test.  References:  1. Gamaliel FERRER, et                            al. AGA Review on TDM in IBD.     Gastroenterol 2017;153:835-857.  2. Ned E, et al. J Crohns Col 2016;10(5):510-515.  3. Reanna CHAVEZ, et al. Giulia Rheum Dis 2015;74:513-518.  4.  Cecy SOLIS, et al. PRINCESS 2011;305(14):4703-7980.  5. Stesyl C, et al. J Clin Gastroenterol 2016;     50:482-489.  6. Krysten H, et al. Clin Gastroenterol Hepatol 2015;     13(3):522-530.  7. Jazmin SOUTH et al. Gastroenterol 2019;156(6):S-617.  These tests were developed and their performance  characteristics determined by [employer]. They have not been  cleared or approved by the Food and Drug Administration.  However, these electrochemiluminescence immunoassay (ECLIA)  measurements of adalimumab and anti-adalimumab antibody  (constituting DoseASSURE ADL) have been developed and  validated in accordance with CLIA (Clinical Laboratory  Improvement Amendments) and the FDA Guidance document, Assay  Development and Validation for Immunogenicity Testing of  Therapeutic Protein Products (2019).   • Calprotectin, Fecal 03/03/2022 97  0 - 120 ug/g Final    Concentration     Interpretation   Follow-Up  <16 - 50 ug/g     Normal           None  >50 -120 ug/g     Borderline       Re-evaluate in 4-6 weeks      >120 ug/g     Abnormal         Repeat as clinically                                     indicated      Mammo Screening Digital Tomosynthesis Bilateral With CAD    Result Date: 1/26/2022  BI-RADS CATEGORY: 2 , BENIGN FINDING(S).      RECOMMENDATIONS: Annual screening mammogram.    NOTES: Mammography does not detect approximately 10-15% of breast cancers. Physical examination of the breasts by a physician and regular monthly breast self examinations are integral parts of breast cancer screening.  A normal mammogram does not exclude breast cancer if there is an abnormal finding on physical examination. When clinically indicated, a biopsy should not be postponed because of a normal mammogram report.  [place] [address]  NOTE: If a biopsy is performed on this patient, a copy of the pathology report would be appreciated.  This report was finalized on 1/26/2022 3:36 PM by  Waqas Yip M.D..      Assessment / Plan      1. Ulcerative pancolitis   2.  History of C. difficile colitis  3.  Iron deficiency anemia  4/12/2022  Patient appears to be in a clinical remission.  Her recent lab work reviewed.  Calprotectin normalized to 97 from over 1000.  CBC CMP unremarkable with normal hemoglobin now 12.7 g/dL adalimumab level low at 2.5 with the antibodies less than 25.   She has not started her Humira weekly doses yet.  Due to her low Humira trough level her Humira dose interval was reduced to 40 mg subcu weekly.  We will discontinue her Apriso.  We will continue with the Humira and Imuran for now  Last dermatology follow-up was month ago  We will discontinue iron pills for now.  Follow-up in 3 months time with lab work    1/4 2022  Patient appears to be in clinical remission.  Her lab work done in November 2021 reviewed.  Her hemoglobin improved 11.7 g/dL.  Rest of the lab works were unremarkable.  She had a induction dose of Humira and is due for her first maintenance dose of Humira tomorrow on 1/5/2022.  For now we will continue her Imuran and the Apriso.  We will plan for discontinuing Apriso in 3 to 6 months time when once she has a good therapeutic Humira level.  We will continue her multivitamins and iron pills for now.   We will get a CBC CMP stool calprotectin and Humira level with antibodies day before her fourth maintenance shot on 4 March 2022  I have also advised her to get an appointment with dermatology for follow-up for skin cancer screening while on Imuran.     11/18/2021  Patient appears to be in clinical remission now with a 2-3 soft bowel movement without any abdominal pain or blood in stool.  She finished her course of tapering dose of steroids.  She is still awaiting her first induction dose of Humira.  She does have a iron deficiency anemia associated with IBD and lower GI bleed from ulcerations.  She also has mild nausea associated with mesalamine.  Her last  episode of C. difficile colitis was in July 2021  We will continue her Apriso and Imuran for now.  We will consider discontinuing her mesalamine 6 months after Humira shot started.   CBC CMP now.  We will get a stool calprotectin in next 3 months time.  We will continue her iron pills for now.      09/13/21  She continues to have 5-6 loose stools daily. It does not seem that her ulcerative colitis is controlled with current regimen of apriso 4 capsules daily plus imuran 50 mg 2 tablets daily. Will likely need step up therapy with biologics. She has already had negative TB screen and hepatitis screening. She will repeat fecal calprotectin now. Colonoscopy will be arranged urgently so that we can change therapy to better manage her IBD. Her last colonoscopy showed severe ulcerative colitis from rectum to hepatic flexure in 11/2020. Luu score was scored after the endoscopy and endoscopic findings.  Total Luu score for ulcerative colitis severity was 9 ( stool pattern 3 points, rectal bleeding 0 points, endoscopic findings 3 points,  global assessment 3 points)-      She will have a colonoscopy performed with monitored anesthesia care. The indications, technique, alternatives and potential risk and complications were discussed with the patient including but not limited to bleeding, bowel perforations, missing lesions and anesthetic complications. The patient understands and wishes to proceed with the procedure and has given their verbal consent. Written patient education information was given to the patient. She should follow up in the office after this procedure to discuss the results and further recommendations can be made at that time. The patient will call if they have further questions before procedure.        7/27/2021  She is feeling better now than she has in months. She has continued medications for treatment of UC as directed. Will continue apriso 4 capsules every morning plus azathioprine 50 mg 2 tablets PO  daily. She already had COVID injection (sergio and sergio) Reviewed previous lab results with her in detail today. CRP was significantly elevated but she also had positive C diff at that time. Stool frequency is still more than preferred with 5 stools daily which are loose. No current rectal bleeding and no abdominal pain. Labs today including CBC, CMP, CRP and iron profile.      6/9/2021  Her symptoms of the ulcerative colitis are currently not controlled. She is still having 7 days stools daily, rectal bleeding and left-sided abdominal pain. She has been taking azathioprine 50 mg 2 tablets once daily plus mesalamine (Apriso) 4 capsules once daily exactly as directed. She will complete lab testing today including CBC, CMP, iron profile and C-reactive protein. She will also have stool testing to rule out any infection including C. difficile and GI panel. Stool calprotectin will gauge bowel inflammation. Her previous calprotectin was elevated at 384 prior to starting the azathioprine.  She may need step up in therapy for treatment of her UC.     3/10/2021  She is feeling better with minimal abdominal pain but still having loose stools and intermittent rectal bleeding. She is taking mesalamine (Apriso) but did not start previously recommended azathioprine (Imuran). For now, I have asked her to complete basic labs as well as iron profile, CRP, B12, vit D and calprotectin stool. Results will be reviewed and depending on those results, may hold off on starting Imuran due to patient preference.   She has already had screening labs needed before starting immunosuppressive therapy.   Reminded her that it is recommended that she get covid vaccination.      12/9/2020   she had a colonoscopy done on 11/17/2020 which revealed extensive diffuse colitis involving the colon up to the hepatic flexure from the rectum.  Dispersed inflammation noted from hyperflexion to the cecum.  Anaspaz normal.  Examined terminal ileum for about  15 cm from IC valve was normal. Pathology revealed acute severe colitis with crypt abscess.  Patient had chronic diarrhea more than 2 months. These findings are more in favor of ulcerative colitis. Recent TB Gold test was negative.  Chronic hepatitis panel was negative for any chronic hep C or hep B infection. She is currently on a tapering dose of steroids. She would definitely benefit from Biologics however at this time patient's insurance does not cover Remicade or the Humira or newer Biologics. We will start her on Apriso along with the Imuran and see how she does with that regimen.   If no significant improvement with that for the next 6 months to 12 months we will consider Biologics and get a prior authorization  Started on Apriso 0.375 g p.o. 4 capsules daily. We will add Imuran 100 mg p.o. daily when once a TPMT genetics available  She is immune to hepatitis A but not to hepatitis B.  She needs a hep B vaccine as outpatient with the booster  Advised flu vaccine and COVID-19 vaccine     11/10/2020  Acute episode of bloody diarrhea, which has progressed now to chronic.  Symptoms more suggestive infective colitis however IBD cannot be ruled out. She states that she finished 10-day course of Augmentin given at the ED but she still has ongoing bloody diarrhea with minimal left lower quadrant abdominal pain. Her basic lab work done in the emergency room 2 weeks ago did not reveal any significant abnormality except borderline leukocytosis.  CT of the abdomen pelvis done revealed a thickened descending colon and the sigmoid colon suggesting colitis.  No family history of any IBD. Given her persistent symptoms we will schedule her for colonoscopy for further evaluation  We will also get a stool for C. difficile and the stool for GI panel             Follow Up:   No follow-ups on file.    Susan Brizuela MD  [facility] [place]  4/12/2022  16:52 EDT     Please note that portions of this note may have been  completed with a voice recognition program.

## 2022-07-11 ENCOUNTER — LAB (OUTPATIENT)
Dept: LAB | Facility: HOSPITAL | Age: 47
End: 2022-07-11

## 2022-07-11 DIAGNOSIS — K51.00 ULCERATIVE PANCOLITIS: ICD-10-CM

## 2022-07-11 LAB
ALBUMIN SERPL-MCNC: 4.8 G/DL (ref 3.5–5.2)
ALBUMIN/GLOB SERPL: 2 G/DL
ALP SERPL-CCNC: 90 U/L (ref 39–117)
ALT SERPL W P-5'-P-CCNC: 12 U/L (ref 1–33)
ANION GAP SERPL CALCULATED.3IONS-SCNC: 10 MMOL/L (ref 5–15)
AST SERPL-CCNC: 19 U/L (ref 1–32)
BASOPHILS # BLD AUTO: 0.03 10*3/MM3 (ref 0–0.2)
BASOPHILS NFR BLD AUTO: 0.5 % (ref 0–1.5)
BILIRUB SERPL-MCNC: 0.2 MG/DL (ref 0–1.2)
BUN SERPL-MCNC: 7 MG/DL (ref 6–20)
BUN/CREAT SERPL: 9.2 (ref 7–25)
CALCIUM SPEC-SCNC: 9.6 MG/DL (ref 8.6–10.5)
CHLORIDE SERPL-SCNC: 105 MMOL/L (ref 98–107)
CO2 SERPL-SCNC: 26 MMOL/L (ref 22–29)
CREAT SERPL-MCNC: 0.76 MG/DL (ref 0.57–1)
DEPRECATED RDW RBC AUTO: 43.4 FL (ref 37–54)
EGFRCR SERPLBLD CKD-EPI 2021: 99.2 ML/MIN/1.73
EOSINOPHIL # BLD AUTO: 0.01 10*3/MM3 (ref 0–0.4)
EOSINOPHIL NFR BLD AUTO: 0.2 % (ref 0.3–6.2)
ERYTHROCYTE [DISTWIDTH] IN BLOOD BY AUTOMATED COUNT: 12.9 % (ref 12.3–15.4)
GLOBULIN UR ELPH-MCNC: 2.4 GM/DL
GLUCOSE SERPL-MCNC: 83 MG/DL (ref 65–99)
HCT VFR BLD AUTO: 36.9 % (ref 34–46.6)
HGB BLD-MCNC: 12.8 G/DL (ref 12–15.9)
IMM GRANULOCYTES # BLD AUTO: 0.02 10*3/MM3 (ref 0–0.05)
IMM GRANULOCYTES NFR BLD AUTO: 0.3 % (ref 0–0.5)
LYMPHOCYTES # BLD AUTO: 1.73 10*3/MM3 (ref 0.7–3.1)
LYMPHOCYTES NFR BLD AUTO: 29 % (ref 19.6–45.3)
MCH RBC QN AUTO: 32.6 PG (ref 26.6–33)
MCHC RBC AUTO-ENTMCNC: 34.7 G/DL (ref 31.5–35.7)
MCV RBC AUTO: 93.9 FL (ref 79–97)
MONOCYTES # BLD AUTO: 0.38 10*3/MM3 (ref 0.1–0.9)
MONOCYTES NFR BLD AUTO: 6.4 % (ref 5–12)
NEUTROPHILS NFR BLD AUTO: 3.79 10*3/MM3 (ref 1.7–7)
NEUTROPHILS NFR BLD AUTO: 63.6 % (ref 42.7–76)
NRBC BLD AUTO-RTO: 0 /100 WBC (ref 0–0.2)
PLATELET # BLD AUTO: 270 10*3/MM3 (ref 140–450)
PMV BLD AUTO: 11.5 FL (ref 6–12)
POTASSIUM SERPL-SCNC: 4 MMOL/L (ref 3.5–5.2)
PROT SERPL-MCNC: 7.2 G/DL (ref 6–8.5)
RBC # BLD AUTO: 3.93 10*6/MM3 (ref 3.77–5.28)
SODIUM SERPL-SCNC: 141 MMOL/L (ref 136–145)
WBC NRBC COR # BLD: 5.96 10*3/MM3 (ref 3.4–10.8)

## 2022-07-11 PROCEDURE — 36415 COLL VENOUS BLD VENIPUNCTURE: CPT

## 2022-07-11 PROCEDURE — 85025 COMPLETE CBC W/AUTO DIFF WBC: CPT

## 2022-07-11 PROCEDURE — 80053 COMPREHEN METABOLIC PANEL: CPT

## 2022-07-14 ENCOUNTER — OFFICE VISIT (OUTPATIENT)
Dept: GASTROENTEROLOGY | Facility: CLINIC | Age: 47
End: 2022-07-14

## 2022-07-14 VITALS
BODY MASS INDEX: 25.44 KG/M2 | WEIGHT: 149 LBS | DIASTOLIC BLOOD PRESSURE: 66 MMHG | SYSTOLIC BLOOD PRESSURE: 116 MMHG | HEIGHT: 64 IN | TEMPERATURE: 98.1 F

## 2022-07-14 DIAGNOSIS — K51.011 ULCERATIVE PANCOLITIS WITH RECTAL BLEEDING: Primary | ICD-10-CM

## 2022-07-14 DIAGNOSIS — Z86.2 HISTORY OF IRON DEFICIENCY ANEMIA: ICD-10-CM

## 2022-07-14 DIAGNOSIS — K51.00 ULCERATIVE PANCOLITIS: ICD-10-CM

## 2022-07-14 PROCEDURE — 99213 OFFICE O/P EST LOW 20 MIN: CPT | Performed by: INTERNAL MEDICINE

## 2022-07-14 RX ORDER — EZETIMIBE 10 MG/1
10 TABLET ORAL DAILY
COMMUNITY
Start: 2022-07-05 | End: 2023-01-16 | Stop reason: ALTCHOICE

## 2022-07-14 NOTE — PROGRESS NOTES
Follow Up Note     Date: 2022   Patient Name: Stacy Guardado  MRN: [id number]  : 1975     Referring Physician: Shayna Swartz PA    Chief Complaint:    Chief Complaint   Patient presents with   • Follow-up   • Ulcerative pancolitis       Interval History:   2022  Stacy Guardado is a 46 y.o. female who is here today for follow up for her pancolitis.  She is doing very well with almost daily soft bowel movement except her recent constipation episode following a long travel.  Denies any abdominal pain no nausea vomiting.  It has been stable.    11/10/2020  She states that she noted to have red blood in stool three weeks ago and that got worse with loose stool.She also develeloped LLQ abdominal pain. Loose stool 5-7 times daily. Deny any fever chills. Patient was in emergency room 2 weeks ago with complaints of diarrhea abdominal pain and blood in the stool.  She had basic lab work done which revealed borderline leukocytosis.  She deny any prior constipation. She also had a CT scan of the abdomen pelvis done which revealed thickening in the descending colon the sigmoid suggesting colitis. She was given Augmentin for ten days.    Weight is stable. Pt denies nausea vomiting or odynophagia or dysphagia. There is no history of acid reflux. There is no history of anemia. No prior history of EGD or colonoscopy. No family history of colon cancer or any GI malignancy, or IBD. No history of any abdominal surgery. Denies alcohol abuse or cigarette smoking.      Subjective      Past Medical History:   Past Medical History:   Diagnosis Date   • Abnormal ECG     tachycardia   • Bipolar affective (HCC)    • Diverticulitis    • Multiple body piercings     ears x 5   • Palpitations    • Tattoos 2020    x's 3   • Tattoos    • Ulcerative colitis (HCC)      Past Surgical History:   Past Surgical History:   Procedure Laterality Date   • COLONOSCOPY N/A 2020    Procedure: COLONOSCOPY WITH  BIOPSIES;  Surgeon: Susan Brizuela MD;  Location: [facility] ENDOSCOPY;  Service: Gastroenterology;  Laterality: N/A;   • COLONOSCOPY N/A 9/17/2021    Procedure: COLONOSCOPY with biopsy,  polypectomy, endoscopic with hot snare polypectomy mucosal resection with injection of normal saline soft colaguation with clip placement x 3;  Surgeon: Susan Brizuela MD;  Location: [facility] ENDOSCOPY;  Service: Gastroenterology;  Laterality: N/A;   • ENDOSCOPY N/A 8/2/2021    Procedure: ESOPHAGOGASTRODUODENOSCOPY WITH BIOPSY ;  Surgeon: Susan Brizuela MD;  Location: [facility] ENDOSCOPY;  Service: Gastroenterology;  Laterality: N/A;   • WISDOM TOOTH EXTRACTION         Family History:   Family History   Problem Relation Age of Onset   • Hyperlipidemia Mother    • Hyperlipidemia Father    • Hypertension Father    • Stroke Brother    • Heart attack Maternal Grandfather    • Heart disease Maternal Grandfather    • Heart disease Paternal Grandfather    • Heart attack Paternal Grandfather    • Hyperlipidemia Brother    • Alcohol abuse Other    • Diabetes Other    • Hypertension Other    • Stroke Other    • Colon cancer Neg Hx    • Cirrhosis Neg Hx    • Liver cancer Neg Hx    • Liver disease Neg Hx    • Breast cancer Neg Hx        Social History:   Social History     Socioeconomic History   • Marital status:    Tobacco Use   • Smoking status: Former Smoker     Packs/day: 1.00     Types: Cigarettes     Quit date: 4/30/2019     Years since quitting: 3.2   • Smokeless tobacco: Never Used   Vaping Use   • Vaping Use: Some days   • Last attempt to quit: 3/18/2020   • Substances: Nicotine, Flavoring   • Devices: Pre-filled or refillable cartridge, Refillable tank   Substance and Sexual Activity   • Alcohol use: Yes     Comment: socially   • Drug use: No   • Sexual activity: Defer       Medications:     Current Outpatient Medications:   •  Adalimumab (Humira Pen) 40 MG/0.8ML Pen-injector Kit, Inject 40 mg under the skin  "into the appropriate area as directed Every 7 (Seven) Days., Disp: 4 each, Rfl: 11  •  azaTHIOprine (IMURAN) 50 MG tablet, Take 2 tablets by mouth Daily., Disp: 60 tablet, Rfl: 5  •  cetirizine (zyrTEC) 10 MG tablet, Take 10 mg by mouth Daily., Disp: , Rfl:   •  ezetimibe (ZETIA) 10 MG tablet, Take 10 mg by mouth Daily., Disp: , Rfl:   •  Pediatric Multivitamins-Fl (Multiple Vitamins/Fluoride) 1 MG chewable tablet, Chew 1 capsule Daily., Disp: , Rfl:     Allergies:   Allergies   Allergen Reactions   • Augmentin [Amoxicillin-Pot Clavulanate] Diarrhea and GI Intolerance       Review of Systems:   Review of Systems   Constitutional: Negative for appetite change, fatigue, fever and unexpected weight loss.   HENT: Negative for trouble swallowing.    Gastrointestinal: Positive for constipation. Negative for abdominal distention, abdominal pain, anal bleeding, blood in stool, diarrhea, nausea, rectal pain, vomiting, GERD and indigestion.       The following portions of the patient's history were reviewed and updated as appropriate: allergies, current medications, past family history, past medical history, past social history, past surgical history and problem list.    Objective     Physical Exam:  Vital Signs:   Vitals:    07/14/22 1602   BP: 116/66   Temp: 98.1 °F (36.7 °C)   Weight: 67.6 kg (149 lb)   Height: 162.6 cm (64\")       Physical Exam  Constitutional:       Appearance: Normal appearance.   HENT:      Head: Normocephalic and atraumatic.   Eyes:      Conjunctiva/sclera: Conjunctivae normal.   Abdominal:      General: Abdomen is flat. There is no distension.      Palpations: There is no mass.      Tenderness: There is no abdominal tenderness. There is no guarding or rebound.      Hernia: No hernia is present.   Musculoskeletal:      Cervical back: Normal range of motion and neck supple.   Neurological:      Mental Status: She is alert.         Results Review:   I reviewed the patient's new clinical results.    Lab " on 07/11/2022   Component Date Value Ref Range Status   • Glucose 07/11/2022 83  65 - 99 mg/dL Final   • BUN 07/11/2022 7  6 - 20 mg/dL Final   • Creatinine 07/11/2022 0.76  0.57 - 1.00 mg/dL Final   • Sodium 07/11/2022 141  136 - 145 mmol/L Final   • Potassium 07/11/2022 4.0  3.5 - 5.2 mmol/L Final   • Chloride 07/11/2022 105  98 - 107 mmol/L Final   • CO2 07/11/2022 26.0  22.0 - 29.0 mmol/L Final   • Calcium 07/11/2022 9.6  8.6 - 10.5 mg/dL Final   • Total Protein 07/11/2022 7.2  6.0 - 8.5 g/dL Final   • Albumin 07/11/2022 4.80  3.50 - 5.20 g/dL Final   • ALT (SGPT) 07/11/2022 12  1 - 33 U/L Final   • AST (SGOT) 07/11/2022 19  1 - 32 U/L Final   • Alkaline Phosphatase 07/11/2022 90  39 - 117 U/L Final   • Total Bilirubin 07/11/2022 0.2  0.0 - 1.2 mg/dL Final   • Globulin 07/11/2022 2.4  gm/dL Final   • A/G Ratio 07/11/2022 2.0  g/dL Final   • BUN/Creatinine Ratio 07/11/2022 9.2  7.0 - 25.0 Final   • Anion Gap 07/11/2022 10.0  5.0 - 15.0 mmol/L Final   • eGFR 07/11/2022 99.2  >60.0 mL/min/1.73 Final    National Kidney Foundation and American Society of Nephrology (ASN) Task Force recommended calculation based on the Chronic Kidney Disease Epidemiology Collaboration (CKD-EPI) equation refit without adjustment for race.   • WBC 07/11/2022 5.96  3.40 - 10.80 10*3/mm3 Final   • RBC 07/11/2022 3.93  3.77 - 5.28 10*6/mm3 Final   • Hemoglobin 07/11/2022 12.8  12.0 - 15.9 g/dL Final   • Hematocrit 07/11/2022 36.9  34.0 - 46.6 % Final   • MCV 07/11/2022 93.9  79.0 - 97.0 fL Final   • MCH 07/11/2022 32.6  26.6 - 33.0 pg Final   • MCHC 07/11/2022 34.7  31.5 - 35.7 g/dL Final   • RDW 07/11/2022 12.9  12.3 - 15.4 % Final   • RDW-SD 07/11/2022 43.4  37.0 - 54.0 fl Final   • MPV 07/11/2022 11.5  6.0 - 12.0 fL Final   • Platelets 07/11/2022 270  140 - 450 10*3/mm3 Final   • Neutrophil % 07/11/2022 63.6  42.7 - 76.0 % Final   • Lymphocyte % 07/11/2022 29.0  19.6 - 45.3 % Final   • Monocyte % 07/11/2022 6.4  5.0 - 12.0 % Final   •  Eosinophil % 07/11/2022 0.2 (A) 0.3 - 6.2 % Final   • Basophil % 07/11/2022 0.5  0.0 - 1.5 % Final   • Immature Grans % 07/11/2022 0.3  0.0 - 0.5 % Final   • Neutrophils, Absolute 07/11/2022 3.79  1.70 - 7.00 10*3/mm3 Final   • Lymphocytes, Absolute 07/11/2022 1.73  0.70 - 3.10 10*3/mm3 Final   • Monocytes, Absolute 07/11/2022 0.38  0.10 - 0.90 10*3/mm3 Final   • Eosinophils, Absolute 07/11/2022 0.01  0.00 - 0.40 10*3/mm3 Final   • Basophils, Absolute 07/11/2022 0.03  0.00 - 0.20 10*3/mm3 Final   • Immature Grans, Absolute 07/11/2022 0.02  0.00 - 0.05 10*3/mm3 Final   • nRBC 07/11/2022 0.0  0.0 - 0.2 /100 WBC Final      No radiology results for the last 90 days.    Assessment / Plan      1. Ulcerative pancolitis   2. History of C. difficile colitis  3. Iron deficiency anemia  7/14/2022  She is doing very well and appears to be in a clinical remission.  Her recent lab work reviewed including CBC CMP were normal.  She is currently taking Humira 40 mg subcu q. weekly along with Imuran 100 mg p.o. daily.  Mesalamine was discontinued last visit.  Her hemoglobin is normal now.  We will get adalimumab trough level now with high-dose q. weekly treatment  We will get a CBC in 6 months time  New small skin lesion in the right hand, dermatology referral for surveillance for skin cancer while on the Biologics and Imuran.  Surveillance colonoscopy  Nov 2023 4/12/2022  Patient appears to be in a clinical remission.  Her recent lab work reviewed.  Calprotectin normalized to 97 from over 1000.  CBC CMP unremarkable with normal hemoglobin now 12.7 g/dL adalimumab level low at 2.5 with the antibodies less than 25. She has not started her Humira weekly doses yet.  Due to her low Humira trough level her Humira dose interval was reduced to 40 mg subcu weekly.  We will discontinue her Apriso.  We will continue with the Humira and Imuran for now  Last dermatology follow-up was month ago    12/9/2020   she had a colonoscopy done on  11/17/2020 which revealed extensive diffuse colitis involving the colon up to the hepatic flexure from the rectum.  Dispersed inflammation noted from hyperflexion to the cecum.  Anaspaz normal.  Examined terminal ileum for about 15 cm from IC valve was normal. Pathology revealed acute severe colitis with crypt abscess.  Patient had chronic diarrhea more than 2 months. These findings are more in favor of ulcerative colitis. Recent TB Gold test was negative.  Chronic hepatitis panel was negative for any chronic hep C or hep B infection. She is currently on a tapering dose of steroids. She would definitely benefit from Biologics however at this time patient's insurance does not cover Remicade or the Humira or newer Biologics. We will start her on Apriso along with the Imuran and see how she does with that regimen.   If no significant improvement with that for the next 6 months to 12 months we will consider Biologics and get a prior authorization      Follow Up:   No follow-ups on file.    Susan Brizuela MD  [facility] [place]  7/14/2022  16:08 EDT     Please note that portions of this note may have been completed with a voice recognition program.

## 2022-09-06 DIAGNOSIS — K51.00 ULCERATIVE PANCOLITIS: ICD-10-CM

## 2022-09-06 RX ORDER — AZATHIOPRINE 50 MG/1
100 TABLET ORAL DAILY
Qty: 60 TABLET | Refills: 5 | Status: SHIPPED | OUTPATIENT
Start: 2022-09-06 | End: 2023-01-16 | Stop reason: SDUPTHER

## 2022-12-07 ENCOUNTER — TRANSCRIBE ORDERS (OUTPATIENT)
Dept: ADMINISTRATIVE | Facility: HOSPITAL | Age: 47
End: 2022-12-07

## 2022-12-07 DIAGNOSIS — Z12.31 BREAST CANCER SCREENING BY MAMMOGRAM: Primary | ICD-10-CM

## 2023-01-10 ENCOUNTER — LAB (OUTPATIENT)
Dept: LAB | Facility: HOSPITAL | Age: 48
End: 2023-01-10
Payer: COMMERCIAL

## 2023-01-10 DIAGNOSIS — K51.00 ULCERATIVE PANCOLITIS: ICD-10-CM

## 2023-01-10 LAB
ALBUMIN SERPL-MCNC: 5 G/DL (ref 3.5–5.2)
ALBUMIN/GLOB SERPL: 2.8 G/DL
ALP SERPL-CCNC: 88 U/L (ref 39–117)
ALT SERPL W P-5'-P-CCNC: 18 U/L (ref 1–33)
ANION GAP SERPL CALCULATED.3IONS-SCNC: 9.5 MMOL/L (ref 5–15)
AST SERPL-CCNC: 22 U/L (ref 1–32)
BASOPHILS # BLD AUTO: 0.04 10*3/MM3 (ref 0–0.2)
BASOPHILS NFR BLD AUTO: 0.7 % (ref 0–1.5)
BILIRUB SERPL-MCNC: 0.2 MG/DL (ref 0–1.2)
BUN SERPL-MCNC: 9 MG/DL (ref 6–20)
BUN/CREAT SERPL: 12 (ref 7–25)
CALCIUM SPEC-SCNC: 9.6 MG/DL (ref 8.6–10.5)
CHLORIDE SERPL-SCNC: 103 MMOL/L (ref 98–107)
CO2 SERPL-SCNC: 26.5 MMOL/L (ref 22–29)
CREAT SERPL-MCNC: 0.75 MG/DL (ref 0.57–1)
DEPRECATED RDW RBC AUTO: 45.8 FL (ref 37–54)
EGFRCR SERPLBLD CKD-EPI 2021: 100.2 ML/MIN/1.73
EOSINOPHIL # BLD AUTO: 0.06 10*3/MM3 (ref 0–0.4)
EOSINOPHIL NFR BLD AUTO: 1.1 % (ref 0.3–6.2)
ERYTHROCYTE [DISTWIDTH] IN BLOOD BY AUTOMATED COUNT: 13.2 % (ref 12.3–15.4)
GLOBULIN UR ELPH-MCNC: 1.8 GM/DL
GLUCOSE SERPL-MCNC: 89 MG/DL (ref 65–99)
HCT VFR BLD AUTO: 38.2 % (ref 34–46.6)
HGB BLD-MCNC: 12.7 G/DL (ref 12–15.9)
IMM GRANULOCYTES # BLD AUTO: 0.02 10*3/MM3 (ref 0–0.05)
IMM GRANULOCYTES NFR BLD AUTO: 0.4 % (ref 0–0.5)
LYMPHOCYTES # BLD AUTO: 1.81 10*3/MM3 (ref 0.7–3.1)
LYMPHOCYTES NFR BLD AUTO: 32.6 % (ref 19.6–45.3)
MCH RBC QN AUTO: 31.4 PG (ref 26.6–33)
MCHC RBC AUTO-ENTMCNC: 33.2 G/DL (ref 31.5–35.7)
MCV RBC AUTO: 94.6 FL (ref 79–97)
MONOCYTES # BLD AUTO: 0.5 10*3/MM3 (ref 0.1–0.9)
MONOCYTES NFR BLD AUTO: 9 % (ref 5–12)
NEUTROPHILS NFR BLD AUTO: 3.13 10*3/MM3 (ref 1.7–7)
NEUTROPHILS NFR BLD AUTO: 56.2 % (ref 42.7–76)
NRBC BLD AUTO-RTO: 0 /100 WBC (ref 0–0.2)
PLATELET # BLD AUTO: 242 10*3/MM3 (ref 140–450)
PMV BLD AUTO: 11.7 FL (ref 6–12)
POTASSIUM SERPL-SCNC: 4.1 MMOL/L (ref 3.5–5.2)
PROT SERPL-MCNC: 6.8 G/DL (ref 6–8.5)
RBC # BLD AUTO: 4.04 10*6/MM3 (ref 3.77–5.28)
SODIUM SERPL-SCNC: 139 MMOL/L (ref 136–145)
WBC NRBC COR # BLD: 5.56 10*3/MM3 (ref 3.4–10.8)

## 2023-01-10 PROCEDURE — 80299 QUANTITATIVE ASSAY DRUG: CPT

## 2023-01-10 PROCEDURE — 85025 COMPLETE CBC W/AUTO DIFF WBC: CPT

## 2023-01-10 PROCEDURE — 36415 COLL VENOUS BLD VENIPUNCTURE: CPT

## 2023-01-10 PROCEDURE — 80053 COMPREHEN METABOLIC PANEL: CPT

## 2023-01-10 PROCEDURE — 80145 DRUG ASSAY ADALIMUMAB: CPT

## 2023-01-16 ENCOUNTER — OFFICE VISIT (OUTPATIENT)
Dept: GASTROENTEROLOGY | Facility: CLINIC | Age: 48
End: 2023-01-16
Payer: COMMERCIAL

## 2023-01-16 VITALS
HEART RATE: 86 BPM | RESPIRATION RATE: 20 BRPM | HEIGHT: 64 IN | TEMPERATURE: 96.8 F | SYSTOLIC BLOOD PRESSURE: 126 MMHG | DIASTOLIC BLOOD PRESSURE: 73 MMHG | WEIGHT: 155 LBS | BODY MASS INDEX: 26.46 KG/M2

## 2023-01-16 DIAGNOSIS — K51.00 ULCERATIVE PANCOLITIS: Primary | ICD-10-CM

## 2023-01-16 DIAGNOSIS — D50.9 IRON DEFICIENCY ANEMIA, UNSPECIFIED IRON DEFICIENCY ANEMIA TYPE: ICD-10-CM

## 2023-01-16 PROCEDURE — 99213 OFFICE O/P EST LOW 20 MIN: CPT | Performed by: INTERNAL MEDICINE

## 2023-01-16 RX ORDER — AZATHIOPRINE 50 MG/1
100 TABLET ORAL DAILY
Qty: 60 TABLET | Refills: 5 | Status: SHIPPED | OUTPATIENT
Start: 2023-01-16

## 2023-01-16 RX ORDER — ZINC OXIDE 13 %
CREAM (GRAM) TOPICAL DAILY
COMMUNITY

## 2023-01-16 RX ORDER — ATORVASTATIN CALCIUM 40 MG/1
40 TABLET, FILM COATED ORAL DAILY
COMMUNITY
Start: 2023-01-14

## 2023-01-16 RX ORDER — LORATADINE 10 MG/1
10 TABLET ORAL DAILY
COMMUNITY

## 2023-01-16 NOTE — PROGRESS NOTES
Follow Up Note     Date: 2023   Patient Name: Stacy Guardado  MRN: [id number]  : 1975     Referring Physician: Shayna Swartz PA    Chief Complaint:    Chief Complaint   Patient presents with   • Ulcerative Colitis   • Anemia       Interval History:   2023  Stacy Guardado is a 47 y.o. female who is here today for follow up for her ulcerative colitis. She is doing very well with a normal regular bowel movement without any abdominal pain nausea vomiting.     11/10/2020  She states that she noted to have red blood in stool three weeks ago and that got worse with loose stool.She also develeloped LLQ abdominal pain. Loose stool 5-7 times daily. Deny any fever chills. Patient was in emergency room 2 weeks ago with complaints of diarrhea abdominal pain and blood in the stool.  She had basic lab work done which revealed borderline leukocytosis.  She deny any prior constipation. She also had a CT scan of the abdomen pelvis done which revealed thickening in the descending colon the sigmoid suggesting colitis. She was given Augmentin for ten days.    Weight is stable. Pt denies nausea vomiting or odynophagia or dysphagia. There is no history of acid reflux. There is no history of anemia. No prior history of EGD or colonoscopy. No family history of colon cancer or any GI malignancy, or IBD. No history of any abdominal surgery. Denies alcohol abuse or cigarette smoking.    Subjective      Past Medical History:   Past Medical History:   Diagnosis Date   • Abnormal ECG     tachycardia   • Bipolar affective (HCC)    • Diverticulitis    • Multiple body piercings     ears x 5   • Palpitations    • Tattoos 2020    x's 3   • Ulcerative colitis (HCC)      Past Surgical History:   Past Surgical History:   Procedure Laterality Date   • COLONOSCOPY N/A 2020    Procedure: COLONOSCOPY WITH BIOPSIES;  Surgeon: Susan Brizuela MD;  Location: [facility] ENDOSCOPY;  Service:  Gastroenterology;  Laterality: N/A;   • COLONOSCOPY N/A 9/17/2021    Procedure: COLONOSCOPY with biopsy,  polypectomy, endoscopic with hot snare polypectomy mucosal resection with injection of normal saline soft colaguation with clip placement x 3;  Surgeon: Susan Brizuela MD;  Location: [facility] ENDOSCOPY;  Service: Gastroenterology;  Laterality: N/A;   • ENDOSCOPY N/A 8/2/2021    Procedure: ESOPHAGOGASTRODUODENOSCOPY WITH BIOPSY ;  Surgeon: Susan Brizuela MD;  Location: [facility] ENDOSCOPY;  Service: Gastroenterology;  Laterality: N/A;   • WISDOM TOOTH EXTRACTION         Family History:   Family History   Problem Relation Age of Onset   • Hyperlipidemia Mother    • Hyperlipidemia Father    • Hypertension Father    • Stroke Brother    • Heart attack Maternal Grandfather    • Heart disease Maternal Grandfather    • Heart disease Paternal Grandfather    • Heart attack Paternal Grandfather    • Hyperlipidemia Brother    • Alcohol abuse Other    • Diabetes Other    • Hypertension Other    • Stroke Other    • Colon cancer Neg Hx    • Cirrhosis Neg Hx    • Liver cancer Neg Hx    • Liver disease Neg Hx    • Breast cancer Neg Hx        Social History:   Social History     Socioeconomic History   • Marital status:    Tobacco Use   • Smoking status: Former     Packs/day: 1.00     Types: Cigarettes     Quit date: 4/30/2019     Years since quitting: 3.7   • Smokeless tobacco: Never   Vaping Use   • Vaping Use: Some days   • Last attempt to quit: 3/18/2020   • Substances: Nicotine, Flavoring   • Devices: Pre-filled or refillable cartridge, Refillable tank   Substance and Sexual Activity   • Alcohol use: Not Currently     Comment: socially   • Drug use: No   • Sexual activity: Defer       Medications:     Current Outpatient Medications:   •  Adalimumab (Humira Pen) 40 MG/0.8ML Pen-injector Kit, Inject 40 mg under the skin into the appropriate area as directed Every 7 (Seven) Days., Disp: 4 each, Rfl: 11  •   "atorvastatin (LIPITOR) 40 MG tablet, Take 40 mg by mouth Daily., Disp: , Rfl:   •  azaTHIOprine (IMURAN) 50 MG tablet, Take 2 tablets by mouth Daily., Disp: 60 tablet, Rfl: 5  •  loratadine (CLARITIN) 10 MG tablet, Take 10 mg by mouth Daily., Disp: , Rfl:   •  Pediatric Multivitamins-Fl (Multiple Vitamins/Fluoride) 1 MG chewable tablet, Chew 1 capsule Daily., Disp: , Rfl:   •  Probiotic Product (Probiotic Daily) capsule, Take  by mouth Daily., Disp: , Rfl:   •  cetirizine (zyrTEC) 10 MG tablet, Take 10 mg by mouth Daily., Disp: , Rfl:   •  ezetimibe (ZETIA) 10 MG tablet, Take 10 mg by mouth Daily., Disp: , Rfl:     Allergies:   Allergies   Allergen Reactions   • Augmentin [Amoxicillin-Pot Clavulanate] Diarrhea and GI Intolerance       Review of Systems:   Review of Systems   Constitutional: Negative for appetite change, fatigue, fever and unexpected weight loss.   HENT: Negative for trouble swallowing.    Gastrointestinal: Negative for abdominal distention, abdominal pain, anal bleeding, blood in stool, constipation, diarrhea, nausea, rectal pain, vomiting, GERD and indigestion.       The following portions of the patient's history were reviewed and updated as appropriate: allergies, current medications, past family history, past medical history, past social history, past surgical history and problem list.    Objective     Physical Exam:  Vital Signs:   Vitals:    01/16/23 1404   BP: 126/73   Pulse: 86   Resp: 20   Temp: 96.8 °F (36 °C)   TempSrc: Infrared   Weight: 70.3 kg (155 lb)   Height: 162.6 cm (64\")       Physical Exam  Constitutional:       Appearance: Normal appearance.   HENT:      Head: Normocephalic and atraumatic.   Eyes:      Conjunctiva/sclera: Conjunctivae normal.   Abdominal:      General: Abdomen is flat. There is no distension.      Palpations: There is no mass.      Tenderness: There is no abdominal tenderness. There is no guarding or rebound.      Hernia: No hernia is present. "   Musculoskeletal:      Cervical back: Normal range of motion and neck supple.   Neurological:      Mental Status: She is alert.         Results Review:   I reviewed the patient's new clinical results.    Lab on 01/10/2023   Component Date Value Ref Range Status   • WBC 01/10/2023 5.56  3.40 - 10.80 10*3/mm3 Final   • RBC 01/10/2023 4.04  3.77 - 5.28 10*6/mm3 Final   • Hemoglobin 01/10/2023 12.7  12.0 - 15.9 g/dL Final   • Hematocrit 01/10/2023 38.2  34.0 - 46.6 % Final   • MCV 01/10/2023 94.6  79.0 - 97.0 fL Final   • MCH 01/10/2023 31.4  26.6 - 33.0 pg Final   • MCHC 01/10/2023 33.2  31.5 - 35.7 g/dL Final   • RDW 01/10/2023 13.2  12.3 - 15.4 % Final   • RDW-SD 01/10/2023 45.8  37.0 - 54.0 fl Final   • MPV 01/10/2023 11.7  6.0 - 12.0 fL Final   • Platelets 01/10/2023 242  140 - 450 10*3/mm3 Final   • Neutrophil % 01/10/2023 56.2  42.7 - 76.0 % Final   • Lymphocyte % 01/10/2023 32.6  19.6 - 45.3 % Final   • Monocyte % 01/10/2023 9.0  5.0 - 12.0 % Final   • Eosinophil % 01/10/2023 1.1  0.3 - 6.2 % Final   • Basophil % 01/10/2023 0.7  0.0 - 1.5 % Final   • Immature Grans % 01/10/2023 0.4  0.0 - 0.5 % Final   • Neutrophils, Absolute 01/10/2023 3.13  1.70 - 7.00 10*3/mm3 Final   • Lymphocytes, Absolute 01/10/2023 1.81  0.70 - 3.10 10*3/mm3 Final   • Monocytes, Absolute 01/10/2023 0.50  0.10 - 0.90 10*3/mm3 Final   • Eosinophils, Absolute 01/10/2023 0.06  0.00 - 0.40 10*3/mm3 Final   • Basophils, Absolute 01/10/2023 0.04  0.00 - 0.20 10*3/mm3 Final   • Immature Grans, Absolute 01/10/2023 0.02  0.00 - 0.05 10*3/mm3 Final   • nRBC 01/10/2023 0.0  0.0 - 0.2 /100 WBC Final   • Glucose 01/10/2023 89  65 - 99 mg/dL Final   • BUN 01/10/2023 9  6 - 20 mg/dL Final   • Creatinine 01/10/2023 0.75  0.57 - 1.00 mg/dL Final   • Sodium 01/10/2023 139  136 - 145 mmol/L Final   • Potassium 01/10/2023 4.1  3.5 - 5.2 mmol/L Final   • Chloride 01/10/2023 103  98 - 107 mmol/L Final   • CO2 01/10/2023 26.5  22.0 - 29.0 mmol/L Final   •  Calcium 01/10/2023 9.6  8.6 - 10.5 mg/dL Final   • Total Protein 01/10/2023 6.8  6.0 - 8.5 g/dL Final   • Albumin 01/10/2023 5.0  3.5 - 5.2 g/dL Final   • ALT (SGPT) 01/10/2023 18  1 - 33 U/L Final   • AST (SGOT) 01/10/2023 22  1 - 32 U/L Final   • Alkaline Phosphatase 01/10/2023 88  39 - 117 U/L Final   • Total Bilirubin 01/10/2023 0.2  0.0 - 1.2 mg/dL Final   • Globulin 01/10/2023 1.8  gm/dL Final   • A/G Ratio 01/10/2023 2.8  g/dL Final   • BUN/Creatinine Ratio 01/10/2023 12.0  7.0 - 25.0 Final   • Anion Gap 01/10/2023 9.5  5.0 - 15.0 mmol/L Final   • eGFR 01/10/2023 100.2  >60.0 mL/min/1.73 Final    National Kidney Foundation and American Society of Nephrology (ASN) Task Force recommended calculation based on the Chronic Kidney Disease Epidemiology Collaboration (CKD-EPI) equation refit without adjustment for race.      No radiology results for the last 90 days.    Assessment / Plan      1. Ulcerative pancolitis   2. History of C. difficile colitis  3. Iron deficiency anemia  1/16/2023  Patient is doing clinically very well.  Her recent lab work done on 01/10/2023 reviewed which reveals normal CBC CMP.  Her hemoglobin improved to 12.7 g/dL from 11.7 g/dL.  She has a dermatology appointment scheduled for next month..  Her ADA level and antibody level pending.    We will follow-up her Humira level and labs in 6 months  We will schedule her for colonoscopy next visit for surveillance and also to assess the response to treatment    7/14/2022  She is doing very well and appears to be in a clinical remission.  Her recent lab work reviewed including CBC CMP were normal.  She is currently taking Humira 40 mg subcu q. weekly along with Imuran 100 mg p.o. daily.  Mesalamine was discontinued last visit.  Her hemoglobin is normal now.  We will get adalimumab trough level now with high-dose q. weekly treatment  We will get a CBC in 6 months time. New small skin lesion in the right hand, dermatology referral for surveillance  for skin cancer while on the Biologics and Imuran. Surveillance colonoscopy  Nov 2023 4/12/2022  Patient appears to be in a clinical remission.  Her recent lab work reviewed.  Calprotectin normalized to 97 from over 1000.  CBC CMP unremarkable with normal hemoglobin now 12.7 g/dL adalimumab level low at 2.5 with the antibodies less than 25. She has not started her Humira weekly doses yet.  Due to her low Humira trough level her Humira dose interval was reduced to 40 mg subcu weekly.  We will discontinue her Apriso.  We will continue with the Humira and Imuran for now  Last dermatology follow-up was month ago     12/9/2020   she had a colonoscopy done on 11/17/2020 which revealed extensive diffuse colitis involving the colon up to the hepatic flexure from the rectum.  Dispersed inflammation noted from hyperflexion to the cecum.  Anaspaz normal.  Examined terminal ileum for about 15 cm from IC valve was normal. Pathology revealed acute severe colitis with crypt abscess.  Patient had chronic diarrhea more than 2 months. These findings are more in favor of ulcerative colitis. Recent TB Gold test was negative.  Chronic hepatitis panel was negative for any chronic hep C or hep B infection. She is currently on a tapering dose of steroids. She would definitely benefit from Biologics however at this time patient's insurance does not cover Remicade or the Humira or newer Biologics. We will start her on Apriso along with the Imuran and see how she does with that regimen.   If no significant improvement with that for the next 6 months to 12 months we will consider Biologics and get a prior authorization       Follow Up:   No follow-ups on file.    Susan Brizuela MD  [facility] [place]  1/16/2023  14:06 EST     Please note that portions of this note may have been completed with a voice recognition program.

## 2023-01-17 LAB
ADALIMUMAB AB SERPL-MCNC: <25 NG/ML
ADALIMUMAB SERPL-MCNC: 9.1 UG/ML

## 2023-01-30 ENCOUNTER — HOSPITAL ENCOUNTER (OUTPATIENT)
Dept: MAMMOGRAPHY | Facility: HOSPITAL | Age: 48
Discharge: HOME | End: 2023-01-30
Admitting: PHYSICIAN ASSISTANT
Payer: COMMERCIAL

## 2023-01-30 DIAGNOSIS — Z12.31 BREAST CANCER SCREENING BY MAMMOGRAM: ICD-10-CM

## 2023-01-30 PROCEDURE — 77067 SCR MAMMO BI INCL CAD: CPT | Performed by: RADIOLOGY

## 2023-01-30 PROCEDURE — 77063 BREAST TOMOSYNTHESIS BI: CPT | Performed by: RADIOLOGY

## 2023-01-30 PROCEDURE — 77067 SCR MAMMO BI INCL CAD: CPT

## 2023-01-30 PROCEDURE — 77063 BREAST TOMOSYNTHESIS BI: CPT

## 2023-02-28 DIAGNOSIS — K51.00 ULCERATIVE PANCOLITIS: ICD-10-CM

## 2023-02-28 RX ORDER — ADALIMUMAB 40MG/0.8ML
KIT SUBCUTANEOUS
Qty: 4 EACH | Refills: 5 | Status: SHIPPED | OUTPATIENT
Start: 2023-02-28

## 2023-08-07 RX ORDER — SODIUM, POTASSIUM,MAG SULFATES 17.5-3.13G
SOLUTION, RECONSTITUTED, ORAL ORAL
Qty: 354 ML | Refills: 0 | Status: SHIPPED | OUTPATIENT
Start: 2023-08-07

## 2023-08-25 DIAGNOSIS — K51.00 ULCERATIVE PANCOLITIS: ICD-10-CM

## 2023-08-25 RX ORDER — ADALIMUMAB 40MG/0.8ML
KIT SUBCUTANEOUS
Qty: 4 EACH | Refills: 5 | Status: SHIPPED | OUTPATIENT
Start: 2023-08-25

## 2023-09-11 NOTE — PRE-PROCEDURE INSTRUCTIONS
PAT phone history completed with pt for upcoming procedure on 9/21/23 with Dr. Brizuela.     PAT PASS GIVEN/REVIEWED WITH PT.  VERBALIZED UNDERSTANDING OF THE FOLLOWING:  DO NOT EAT, DRINK, SMOKE, USE SMOKELESS TOBACCO OR CHEW GUM AFTER MIDNIGHT THE NIGHT BEFORE SURGERY.  THIS ALSO INCLUDES HARD CANDIES AND MINTS.    DO NOT SHAVE THE AREA TO BE OPERATED ON AT LEAST 48 HOURS PRIOR TO THE PROCEDURE.  DO NOT WEAR MAKE UP OR NAIL POLISH.  DO NOT LEAVE IN ANY PIERCING OR WEAR JEWELRY THE DAY OF SURGERY.      DO NOT USE ADHESIVES IF YOU WEAR DENTURES.    DO NOT WEAR EYE CONTACTS; BRING IN YOUR GLASSES.    ONLY TAKE MEDICATION THE MORNING OF YOUR PROCEDURE IF INSTRUCTED BY YOUR SURGEON WITH ENOUGH WATER TO SWALLOW THE MEDICATION.  IF YOUR SURGEON DID NOT SPECIFY WHICH MEDICATIONS TO TAKE, YOU WILL NEED TO CALL THEIR OFFICE FOR FURTHER INSTRUCTIONS AND DO AS THEY INSTRUCT.    LEAVE ANYTHING YOU CONSIDER VALUABLE AT HOME.    YOU WILL NEED TO ARRANGE FOR SOMEONE TO DRIVE YOU HOME AFTER SURGERY.  IT IS RECOMMENDED THAT YOU DO NOT DRIVE, WORK, DRINK ALCOHOL OR MAKE MAJOR DECISIONS FOR AT LEAST 24 HOURS AFTER YOUR PROCEDURE IS COMPLETE.      THE DAY OF YOUR PROCEDURE, BRING IN THE FOLLOWING IF APPLICABLE:   PICTURE ID AND INSURANCE/MEDICARE OR MEDICAID CARDS/ANY CO-PAY THAT MAY BE DUE   COPY OF ADVANCED DIRECTIVE/LIVING WILL/POWER OR    CPAP/BIPAP/INHALERS   SKIN PREP SHEET   YOUR PREADMISSION TESTING PASS (IF NOT A PHONE HISTORY)    Medication instructions given to pt by RN per anesthesia protocol.  Pt referred back to surgeon for further instructions if he/she is on any blood thinners.

## 2023-09-21 ENCOUNTER — ANESTHESIA EVENT (OUTPATIENT)
Dept: GASTROENTEROLOGY | Facility: HOSPITAL | Age: 48
End: 2023-09-21
Payer: COMMERCIAL

## 2023-09-21 ENCOUNTER — HOSPITAL ENCOUNTER (OUTPATIENT)
Facility: HOSPITAL | Age: 48
Setting detail: HOSPITAL OUTPATIENT SURGERY
Discharge: HOME | End: 2023-09-21
Attending: INTERNAL MEDICINE | Admitting: INTERNAL MEDICINE
Payer: COMMERCIAL

## 2023-09-21 ENCOUNTER — ANESTHESIA (OUTPATIENT)
Dept: GASTROENTEROLOGY | Facility: HOSPITAL | Age: 48
End: 2023-09-21
Payer: COMMERCIAL

## 2023-09-21 VITALS
RESPIRATION RATE: 20 BRPM | HEIGHT: 64 IN | WEIGHT: 154 LBS | BODY MASS INDEX: 26.29 KG/M2 | SYSTOLIC BLOOD PRESSURE: 109 MMHG | TEMPERATURE: 97 F | HEART RATE: 71 BPM | OXYGEN SATURATION: 98 % | DIASTOLIC BLOOD PRESSURE: 71 MMHG

## 2023-09-21 DIAGNOSIS — K51.00 ULCERATIVE PANCOLITIS: ICD-10-CM

## 2023-09-21 PROCEDURE — 45385 COLONOSCOPY W/LESION REMOVAL: CPT | Performed by: INTERNAL MEDICINE

## 2023-09-21 PROCEDURE — 45380 COLONOSCOPY AND BIOPSY: CPT | Performed by: INTERNAL MEDICINE

## 2023-09-21 PROCEDURE — 25010000002 PROPOFOL 10 MG/ML EMULSION: Performed by: NURSE ANESTHETIST, CERTIFIED REGISTERED

## 2023-09-21 RX ORDER — SODIUM CHLORIDE 9 MG/ML
70 INJECTION, SOLUTION INTRAVENOUS CONTINUOUS PRN
Status: DISCONTINUED | OUTPATIENT
Start: 2023-09-21 | End: 2023-09-21 | Stop reason: HOSPADM

## 2023-09-21 RX ORDER — SIMETHICONE 20 MG/.3ML
EMULSION ORAL AS NEEDED
Status: DISCONTINUED | OUTPATIENT
Start: 2023-09-21 | End: 2023-09-21 | Stop reason: HOSPADM

## 2023-09-21 RX ORDER — LIDOCAINE HYDROCHLORIDE 20 MG/ML
INJECTION, SOLUTION INTRAVENOUS AS NEEDED
Status: DISCONTINUED | OUTPATIENT
Start: 2023-09-21 | End: 2023-09-21 | Stop reason: SURG

## 2023-09-21 RX ORDER — SODIUM CHLORIDE 9 MG/ML
INJECTION, SOLUTION INTRAVENOUS CONTINUOUS PRN
Status: DISCONTINUED | OUTPATIENT
Start: 2023-09-21 | End: 2023-09-21 | Stop reason: SURG

## 2023-09-21 RX ADMIN — SODIUM CHLORIDE 70 ML/HR: 9 INJECTION, SOLUTION INTRAVENOUS at 08:25

## 2023-09-21 RX ADMIN — LIDOCAINE HYDROCHLORIDE 40 MG: 20 INJECTION, SOLUTION INTRAVENOUS at 08:46

## 2023-09-21 RX ADMIN — SODIUM CHLORIDE: 9 INJECTION, SOLUTION INTRAVENOUS at 08:46

## 2023-09-21 RX ADMIN — PROPOFOL 200 MCG/KG/MIN: 10 INJECTION, EMULSION INTRAVENOUS at 08:46

## 2023-09-21 NOTE — DISCHARGE INSTRUCTIONS
Rest today  No pushing,pulling,tugging,heavy lifting, or strenuous activity   No major decision making,driving,or drinking alcoholic beverages for 24 hours due to the sedation you received  Always use good hand hygiene/washing technique  No driving on pain medication.    To assist you in voiding:  Drink plenty of fluids  Listen to running water while attempting to void.    If you are unable to urinate and you have an uncomfortable urge to void or it has been   6 hours since you were discharged, return to the Emergency Room.    - Discharge patient to home (ambulatory).   - Resume previous diet.   - Continue present medications.   - Await pathology results.   - Repeat colonoscopy in 2-3 years for surveillance (will consider chromoendoscopy for next colonosocpy).   - Return to GI office in 8 weeks.

## 2023-09-21 NOTE — ANESTHESIA POSTPROCEDURE EVALUATION
Patient: Stacy Guardado    Procedure Summary       Date: 09/21/23 Room / Location: [facility] ENDOSCOPY 2 / [facility] ENDOSCOPY    Anesthesia Start: 0830 Anesthesia Stop: 0911    Procedure: COLONOSCOPY with biopsy and polypectomy (Anus) Diagnosis:       Ulcerative pancolitis      (Ulcerative pancolitis [K51.00])    Surgeons: Susan Brizuela MD Provider: Danilo García CRNA    Anesthesia Type: MAC ASA Status: 2            Anesthesia Type: MAC    Vitals  No vitals data found for the desired time range.          Post Anesthesia Care and Evaluation    Patient location during evaluation: PHASE II  Patient participation: complete - patient participated  Level of consciousness: awake and alert  Pain score: 0  Pain management: satisfactory to patient    Airway patency: patent  Anesthetic complications: No anesthetic complications  PONV Status: none  Cardiovascular status: acceptable and stable  Respiratory status: acceptable and spontaneous ventilation  Hydration status: acceptable    Comments: Vitals signs as noted in nursing documentation as per protocol.

## 2023-09-21 NOTE — ANESTHESIA PREPROCEDURE EVALUATION
Anesthesia Evaluation     Patient summary reviewed and Nursing notes reviewed   no history of anesthetic complications:   NPO Solid Status: > 8 hours  NPO Liquid Status: > 8 hours           Airway   Mallampati: II  TM distance: >3 FB  Neck ROM: full  Possible difficult intubation  Dental - normal exam     Pulmonary - normal exam   (+) a smoker Former, COPD,  Cardiovascular - normal exam    ECG reviewed    (+) dysrhythmias PVC, Tachycardia, hyperlipidemia    ROS comment: Sinus tachycardia with short AR and short QT    Neuro/Psych  (+) psychiatric history Anxiety, Depression and Bipolar  GI/Hepatic/Renal/Endo - negative ROS     Musculoskeletal (-) negative ROS    Abdominal  - normal exam   Substance History - negative use     OB/GYN negative ob/gyn ROS         Other        ROS/Med Hx Other: Palpitations  Anxiety  Depression  Tobacco abuse  Facial droop  Sepsis without acute organ dysfunction (CMS/HCC)  Former smoker  Hypomagnesemia  Ulcerative pancolitis (CMS/HCC)  Nausea  Iron deficiency anemia                      Anesthesia Plan    ASA 2     MAC     (Risks and benefits discussed including risk of aspiration, recall and dental damage. All patient questions answered.    Will continue with plan of care.    Patient advised that intravenous sedation would be utilized as primary anesthetic technique. Every effort will be made to make sure patient is comfortable. Patient advised that they may experience recall of events of the procedure. Patient verbalized understanding and agreed to plan. )  intravenous induction     Anesthetic plan, risks, benefits, and alternatives have been provided, discussed and informed consent has been obtained with: patient.  Pre-procedure education provided  Plan discussed with CRNA.

## 2023-09-21 NOTE — H&P
[facility]  HISTORY AND PHYSICAL    Patient Name: Stacy Guardado  : 1975  MRN: [id number]    Chief Complaint:   For colonoscopy    History Of Presenting Illness:    Ulcerative colitis   TOMI    Past Medical History:   Diagnosis Date    Abnormal ECG     tachycardia    Bipolar affective     COPD (chronic obstructive pulmonary disease)     Diverticulitis     Elevated cholesterol     Multiple body piercings     ears x 5    Palpitations     Tattoos 2020    x's 3    Ulcerative colitis        Past Surgical History:   Procedure Laterality Date    COLONOSCOPY N/A 2020    Procedure: COLONOSCOPY WITH BIOPSIES;  Surgeon: Susan Brizuela MD;  Location: [facility] ENDOSCOPY;  Service: Gastroenterology;  Laterality: N/A;    COLONOSCOPY N/A 2021    Procedure: COLONOSCOPY with biopsy,  polypectomy, endoscopic with hot snare polypectomy mucosal resection with injection of normal saline soft colaguation with clip placement x 3;  Surgeon: Susan Brizuela MD;  Location: [facility] ENDOSCOPY;  Service: Gastroenterology;  Laterality: N/A;    ENDOSCOPY N/A 2021    Procedure: ESOPHAGOGASTRODUODENOSCOPY WITH BIOPSY ;  Surgeon: Susan Brizuela MD;  Location: [facility] ENDOSCOPY;  Service: Gastroenterology;  Laterality: N/A;    WISDOM TOOTH EXTRACTION         Social History     Socioeconomic History    Marital status:    Tobacco Use    Smoking status: Former     Packs/day: 1.00     Years: 10.00     Pack years: 10.00     Types: Cigarettes     Quit date: 2019     Years since quittin.3    Smokeless tobacco: Never   Vaping Use    Vaping Use: Some days    Last attempt to quit: 3/18/2020    Substances: Nicotine, Flavoring    Devices: Pre-filled or refillable cartridge, Refillable tank   Substance and Sexual Activity    Alcohol use: Not Currently    Drug use: No    Sexual activity: Defer       Family History   Problem Relation Age of Onset    Hyperlipidemia Mother      Hyperlipidemia Father     Hypertension Father     Stroke Brother     Hyperlipidemia Brother     Heart attack Maternal Grandfather     Heart disease Maternal Grandfather     Heart disease Paternal Grandfather     Heart attack Paternal Grandfather     Alcohol abuse Other     Diabetes Other     Hypertension Other     Stroke Other     Colon cancer Neg Hx     Cirrhosis Neg Hx     Liver cancer Neg Hx     Liver disease Neg Hx     Breast cancer Neg Hx     Ovarian cancer Neg Hx        Prior to Admission Medications:  Medications Prior to Admission   Medication Sig Dispense Refill Last Dose    Adalimumab (Humira Pen) 40 MG/0.8ML Pen-injector Kit INJECT 1 PEN (40 MG) UNDER THE SKIN ONCE WEEKLY 4 each 5 Past Week    atorvastatin (LIPITOR) 40 MG tablet Take 1 tablet by mouth Daily.   9/20/2023    azaTHIOprine (IMURAN) 50 MG tablet Take 2 tablets by mouth Daily. 60 tablet 5 9/20/2023    cetirizine (zyrTEC) 10 MG tablet Take 1 tablet by mouth Daily.   9/20/2023    Pediatric Multivitamins-Fl (Multiple Vitamins/Fluoride) 1 MG chewable tablet Chew 1 capsule Daily.   9/20/2023    Probiotic Product (Probiotic Daily) capsule Take  by mouth Daily.   9/20/2023    Sodium Chloride-Sodium Bicarb (NETI POT SINUS WASH NA) into the nostril(s) as directed by provider.   9/20/2023    sodium-potassium-magnesium sulfates (SUPREP) 17.5-3.13-1.6 GM/177ML solution oral solution Take 2 bottles by mouth 1 (One) Time for 1 dose. Please see prep instructions from office 354 mL 0 9/21/2023    omeprazole (priLOSEC) 20 MG capsule Take 1 capsule by mouth 2 (Two) Times a Day. (Patient not taking: Reported on 9/11/2023)   Unknown       Allergies:  Allergies   Allergen Reactions    Augmentin [Amoxicillin-Pot Clavulanate] Diarrhea and GI Intolerance        Vitals: Temp:  [98 °F (36.7 °C)] 98 °F (36.7 °C)  Heart Rate:  [86] 86  Resp:  [20] 20  BP: (120)/(73) 120/73    Review Of Systems:  Constitutional:  Negative for chills, fever, and unexpected weight  change.  Respiratory:  Negative for cough, chest tightness, shortness of breath, and wheezing.  Cardiovascular:  Negative for chest pain, palpitations, and leg swelling.  Gastrointestinal:  Negative for abdominal distention, abdominal pain, nausea, vomiting.  Neurological:  Negative for weakness, numbness, and headaches.     Physical Exam:    General Appearance:  Alert, cooperative, in no acute distress.   Lungs:   Clear to auscultation, respirations regular, even and                 unlabored.   Heart:  Regular rhythm and normal rate.   Abdomen:   Normal bowel sounds, no masses, no organomegaly. Soft, nontender, nondistended   Neurologic: Alert and oriented x 3. Moves all four limbs equally       Assessment & Plan     Assessment:  Principal Problem:    Ulcerative pancolitis      Plan: COLONOSCOPY (N/A)     Susan Brizuela MD  9/21/2023

## 2023-09-22 LAB — REF LAB TEST METHOD: NORMAL

## 2023-12-06 ENCOUNTER — TRANSCRIBE ORDERS (OUTPATIENT)
Dept: ADMINISTRATIVE | Facility: HOSPITAL | Age: 48
End: 2023-12-06
Payer: COMMERCIAL

## 2023-12-06 DIAGNOSIS — Z12.31 BREAST CANCER SCREENING BY MAMMOGRAM: Primary | ICD-10-CM

## 2024-01-22 ENCOUNTER — OFFICE VISIT (OUTPATIENT)
Dept: GASTROENTEROLOGY | Facility: CLINIC | Age: 49
End: 2024-01-22
Payer: COMMERCIAL

## 2024-01-22 VITALS
SYSTOLIC BLOOD PRESSURE: 131 MMHG | HEART RATE: 87 BPM | WEIGHT: 168 LBS | TEMPERATURE: 96.8 F | BODY MASS INDEX: 28.68 KG/M2 | DIASTOLIC BLOOD PRESSURE: 87 MMHG | HEIGHT: 64 IN

## 2024-01-22 DIAGNOSIS — Z87.898 HISTORY OF DYSPHAGIA: Primary | ICD-10-CM

## 2024-01-22 DIAGNOSIS — K51.00 ULCERATIVE PANCOLITIS: ICD-10-CM

## 2024-01-22 DIAGNOSIS — Z86.2 HISTORY OF IRON DEFICIENCY ANEMIA: ICD-10-CM

## 2024-01-22 DIAGNOSIS — K21.9 GASTROESOPHAGEAL REFLUX DISEASE WITHOUT ESOPHAGITIS: ICD-10-CM

## 2024-01-22 PROCEDURE — 99214 OFFICE O/P EST MOD 30 MIN: CPT | Performed by: INTERNAL MEDICINE

## 2024-01-22 RX ORDER — AZATHIOPRINE 50 MG/1
50 TABLET ORAL DAILY
Qty: 90 TABLET | Refills: 1 | Status: SHIPPED | OUTPATIENT
Start: 2024-01-22

## 2024-01-22 RX ORDER — AZATHIOPRINE 50 MG/1
100 TABLET ORAL DAILY
Qty: 90 TABLET | Refills: 1 | Status: SHIPPED | OUTPATIENT
Start: 2024-01-22 | End: 2024-01-22 | Stop reason: SDUPTHER

## 2024-01-22 NOTE — PROGRESS NOTES
Follow Up Note     Date: 2024   Patient Name: Stacy Guardado  MRN: [id number]  : 1975     Referring Physician: Shayna Swartz PA    Chief Complaint:    Chief Complaint   Patient presents with    Ulcerative Colitis    Difficulty Swallowing    Anemia     Hx TOMI         Interval History:   2024  Stacy Guardado is a 48 y.o. female who is here today for follow up for her ulcerative colitis.  She states that she is doing pretty well without any stomach issues now.  Her swallowing is improved.  She is here for follow-up after recent colonoscopy.    11/10/2020  She states that she noted to have red blood in stool three weeks ago and that got worse with loose stool.She also develeloped LLQ abdominal pain. Loose stool 5-7 times daily. Deny any fever chills. Patient was in emergency room 2 weeks ago with complaints of diarrhea abdominal pain and blood in the stool.  She had basic lab work done which revealed borderline leukocytosis.  She deny any prior constipation. She also had a CT scan of the abdomen pelvis done which revealed thickening in the descending colon the sigmoid suggesting colitis. She was given Augmentin for ten days.    Weight is stable. Pt denies nausea vomiting or odynophagia or dysphagia. There is no history of acid reflux. There is no history of anemia. No prior history of EGD or colonoscopy. No family history of colon cancer or any GI malignancy, or IBD. No history of any abdominal surgery. Denies alcohol abuse or cigarette smoking.    Subjective      Past Medical History:   Past Medical History:   Diagnosis Date    Abnormal ECG     tachycardia    Bipolar affective     COPD (chronic obstructive pulmonary disease)     Diverticulitis     Elevated cholesterol     Multiple body piercings     ears x 5    Palpitations     Tattoos 2020    x's 3    Ulcerative colitis      Past Surgical History:   Past Surgical History:   Procedure Laterality Date    COLONOSCOPY N/A  2020    Procedure: COLONOSCOPY WITH BIOPSIES;  Surgeon: Susan Brizuela MD;  Location: [facility] ENDOSCOPY;  Service: Gastroenterology;  Laterality: N/A;    COLONOSCOPY N/A 2021    Procedure: COLONOSCOPY with biopsy,  polypectomy, endoscopic with hot snare polypectomy mucosal resection with injection of normal saline soft colaguation with clip placement x 3;  Surgeon: Susan Brizuela MD;  Location: [facility] ENDOSCOPY;  Service: Gastroenterology;  Laterality: N/A;    COLONOSCOPY N/A 2023    Procedure: COLONOSCOPY with biopsy and polypectomy;  Surgeon: Susan Brizuela MD;  Location: [facility] ENDOSCOPY;  Service: Gastroenterology;  Laterality: N/A;    ENDOSCOPY N/A 2021    Procedure: ESOPHAGOGASTRODUODENOSCOPY WITH BIOPSY ;  Surgeon: Susan Brizuela MD;  Location: [facility] ENDOSCOPY;  Service: Gastroenterology;  Laterality: N/A;    WISDOM TOOTH EXTRACTION         Family History:   Family History   Problem Relation Age of Onset    Hyperlipidemia Mother     Hyperlipidemia Father     Hypertension Father     Stroke Brother     Hyperlipidemia Brother     Heart attack Maternal Grandfather     Heart disease Maternal Grandfather     Heart disease Paternal Grandfather     Heart attack Paternal Grandfather     Alcohol abuse Other     Diabetes Other     Hypertension Other     Stroke Other     Colon cancer Neg Hx     Cirrhosis Neg Hx     Liver cancer Neg Hx     Liver disease Neg Hx     Breast cancer Neg Hx     Ovarian cancer Neg Hx        Social History:   Social History     Socioeconomic History    Marital status:    Tobacco Use    Smoking status: Former     Packs/day: 1.00     Years: 10.00     Additional pack years: 0.00     Total pack years: 10.00     Types: Cigarettes     Quit date: 2019     Years since quittin.7    Smokeless tobacco: Never   Vaping Use    Vaping Use: Some days    Last attempt to quit: 3/18/2020    Substances: Nicotine, Flavoring    Devices: Pre-filled  "or refillable cartridge, Refillable tank   Substance and Sexual Activity    Alcohol use: Not Currently    Drug use: No    Sexual activity: Defer       Medications:     Current Outpatient Medications:     Adalimumab (Humira Pen) 40 MG/0.8ML Pen-injector Kit, INJECT 1 PEN (40 MG) UNDER THE SKIN ONCE WEEKLY, Disp: 4 each, Rfl: 5    atorvastatin (LIPITOR) 40 MG tablet, Take 1 tablet by mouth Daily., Disp: , Rfl:     azaTHIOprine (IMURAN) 50 MG tablet, Take 2 tablets by mouth Daily., Disp: 60 tablet, Rfl: 5    cetirizine (zyrTEC) 10 MG tablet, Take 1 tablet by mouth Daily., Disp: , Rfl:     omeprazole (priLOSEC) 20 MG capsule, Take 1 capsule by mouth 2 (Two) Times a Day., Disp: , Rfl:     Pediatric Multivitamins-Fl (Multiple Vitamins/Fluoride) 1 MG chewable tablet, Chew 1 capsule Daily., Disp: , Rfl:     Probiotic Product (Probiotic Daily) capsule, Take  by mouth Daily., Disp: , Rfl:     Sodium Chloride-Sodium Bicarb (NETI POT SINUS WASH NA), into the nostril(s) as directed by provider., Disp: , Rfl:     Allergies:   Allergies   Allergen Reactions    Augmentin [Amoxicillin-Pot Clavulanate] Diarrhea and GI Intolerance       Review of Systems:   Review of Systems   Constitutional:  Negative for appetite change, fatigue, fever and unexpected weight loss.   HENT:  Negative for trouble swallowing.    Gastrointestinal:  Negative for abdominal distention, abdominal pain, anal bleeding, blood in stool, constipation, diarrhea, nausea, rectal pain, vomiting, GERD and indigestion.       The following portions of the patient's history were reviewed and updated as appropriate: allergies, current medications, past family history, past medical history, past social history, past surgical history and problem list.    Objective     Physical Exam:  Vital Signs:   Vitals:    01/22/24 1633   BP: 131/87   Pulse: 87   Temp: 96.8 °F (36 °C)   TempSrc: Infrared   Weight: 76.2 kg (168 lb)   Height: 162.6 cm (64\")  Comment: per patient. "       Physical Exam  Constitutional:       Appearance: Normal appearance.   HENT:      Head: Normocephalic and atraumatic.   Eyes:      Conjunctiva/sclera: Conjunctivae normal.   Abdominal:      General: Abdomen is flat. There is no distension.      Palpations: There is no mass.      Tenderness: There is no abdominal tenderness. There is no guarding or rebound.      Hernia: No hernia is present.   Musculoskeletal:      Cervical back: Normal range of motion and neck supple.   Neurological:      Mental Status: She is alert.         Results Review:   I reviewed the patient's new clinical results.    No visits with results within 90 Day(s) from this visit.   Latest known visit with results is:   Admission on 2023, Discharged on 2023   Component Date Value Ref Range Status    Reference Lab Report 2023    Final                    Value:[facility]  [address] [place] [address]  Phone: [phone] or [phone]  Fax: [phone]  Tramaine Horner M.D., Medical Director    PATIENT NAME                                     LABORATORY [address].  [id number]   CARLOS [address]AUGHN.                          [id number]  [id number]                                 AGE                    SEX   SSN              CLIENT REF #  [place] [id number]        1975      F     [id number]      [id number]    [address] [place] [address] BY-PASS                        REQUESTING M.D.           ATTENDING M.JUAN.         COPY TO.   [address]                                [place] [address]                         [facility]  DATE COLLECTED            DATE RECEIVED          DATE REPORTED  2023    DIAGNOSIS:  A.     CECUM BIOPSY, AND ASCENDING:  Colonic type mucosa with no significant histopathologic                           abnormalities  B.     SIGMOID COLON POLYP, PSEUDO:  Benign mucosal  fold, multiple deeper levels evaluated  C.     TRANSVERSE COLON BIOPSY:  Colonic type mucosa with no significant histopathologic abnormalities  D.     DESCENDING COLON BIOPSY:  Colonic type mucosa with no significant histopathologic abnormalities  E.     SIGMOID COLON BIOPSY:  Colonic type mucosa with no significant histopathologic abnormalities  F.     RECTAL BIOPSY:  Colonic type mucosa with no significant histopathologic abnormalities  G.     TERMINAL ILEUM BIOPSY:  Small intestinal mucosa with no significant histopathologic abnormalities  H.     CECUM POLYP, EMR EDGE:  Benign mucosal fold, multiple deeper levels evaluated    NINFA    CLINICAL HISTORY:  Ulcerative pancolitis    SPECIMENS RECEIVED:  A.    CECUM BIOPSY, AND ASCENDING  B.    SIGMOID COLON POLYP, PSEUDO  C.    TRANSVERSE COLON BIOPSY  D.    DESCENDING COLON BIOPSY  E.    SIGMOID COLON BIOPSY  F.    RECTAL BIOPSY  G.    TERMINAL ILEUM BIOPSY  H.    CECUM                           POLYP, EMR EDGE    MICROSCOPIC DESCRIPTION:  Tissue blocks are prepared and slides are examined microscopically on all  specimens. See diagnosis for details.    Professional interpretation rendered by Nabeel Perdue M.D., FVON.A.P. at [employer], [employer], [address] [place] [address].    GROSS DESCRIPTION:  A.    Labeled cecum and ascending colon biopsy are multiple portions of tan  soft tissue measuring 0.5 x 0.5 x 0.2 cm in aggregate, submitted entirely in  1 block and the smallest portions may not survive processing.  MTH  B.    Labeled sigmoid colon pseudopolyp is a 0.2 x 0.2 x 0.2 cm portion of tan  soft tissue which is submitted entirely in 1 block.  C.    Labeled transverse colon biopsy are 2 portions of tan soft tissue measuring  0.4 x 0.2 x 0.2 cm in aggregate, submitted entirely in 1 block.  D.    Labeled descending colon biopsy are multiple portions of tan soft tissue  measuring 0.4 x 0.4 x 0.1 cm in aggregate, submitted entirely in 1 block.  E.    Labeled as  sigmoid                           colon biopsy is a 0.3 x 0.2 x 0.2 cm portion of tan soft  tissue which is submitted entirely in 1 block.  F.    Labeled rectum biopsy is a 0.2 x 0.2 x 0.2 cm portion of tan soft tissue  which is submitted entirely in 1 block.  G.    Labeled TI biopsy are 2 portions of tan soft tissue measuring 0.4 x 0.3 x  0.2 cm in aggregate, submitted entirely in 1 block.  H.    Labeled cecal EMR edge polyp x 1 are 3 portions of tan soft tissue  measuring 0.6 x 0.3 x 0.2 cm in aggregate, submitted entirely in 1 block.    REVIEWED, DIAGNOSED AND ELECTRONICALLY  SIGNED BY:    Nabeel Perdue M.D., F.C.A.P.  CPT CODES:  88305x8        No radiology results for the last 90 days.      colonoscopy  9/21/2023  - Diverticulosis in the sigmoid colon and in the descending colon.  - Inactive (Luu Score 0) pancolitis ulcerative colitis, improved since the last examination. Biopsied.  - Multipel large pesudopolyps were found in the sigmoid colon, descending colon. Biopsied. No alarming features with NBI  - The examined portion of the ileum was normal. Biopsied  - Endoscopically no active disease and on endoscopic remission    DIAGNOSIS:  A. CECUM BIOPSY, AND ASCENDING:  Colonic type mucosa with no significant histopathologic abnormalities  B. SIGMOID COLON POLYP, PSEUDO:  Benign mucosal fold, multiple deeper levels evaluated  C. TRANSVERSE COLON BIOPSY:  Colonic type mucosa with no significant histopathologic abnormalities  D. DESCENDING COLON BIOPSY:  Colonic type mucosa with no significant histopathologic abnormalities  E. SIGMOID COLON BIOPSY:  Colonic type mucosa with no significant histopathologic abnormalities  F. RECTAL BIOPSY:  Colonic type mucosa with no significant histopathologic abnormalities  G. TERMINAL ILEUM BIOPSY:  Small intestinal mucosa with no significant histopathologic abnormalities  H. CECUM POLYP, EMR EDGE:  Benign mucosal fold, multiple deeper levels evaluated  Assessment / Plan       1. Ulcerative pancolitis on remission  2. History of C. difficile colitis  3. History of iron deficiency anemia  1/22/2024  Colonoscopy done on 9/21/2023 did not reveal any endoscopic signs of colitis or ileitis.  Chronic biopsies and ileal biopsies were normal.  Patient did have a pseudopolyps in the sigmoid colon and the descending colon.  Patient is in a clinical, endoscopic, microscopic remission.  Last lab work was in July 2023 which revealed normal CMP with borderline glucose of 117.  CBC was normal.  Her last Humira level on 1/10/2023 was 9.1 with antibodies less than 25.     Patient is currently on Humira weekly due to low therapeutic Humira level.  She was also on Imuran.  Patient is keen on discontinuing the Imuran.  We had a discussion regarding flareup of her colitis while taking off from Imuran.  After discussing all this we decided to taper her Imuran and discontinuing the next 6 months time after obtaining Humira trough level this week.  She was following in dermatology as advised.    Will get lab work CBC CMP  Continue marrow weekly  Will reduce her Imuran from 100 mg to 50 mg p.o. daily for next 3 to 6-month then will discontinue  Recommend chromoendoscopy in 2-3 years    7/14/2022  She is doing very well and appears to be in a clinical remission. She is currently taking Humira 40 mg subcu q. weekly along with Imuran 100 mg p.o. daily.  Mesalamine was discontinued last visit.  Her hemoglobin is normal now. Calprotectin normalized to 97 from over 1000.  CBC CMP unremarkable with normal hemoglobin now 12.7 g/dL adalimumab level low at 2.5 with the antibodies less than 25. She has not started her Humira weekly doses yet.  Due to her low Humira trough level her Humira dose interval was reduced to 40 mg subcu weekly.  We will discontinue her Apriso.  We will continue with the Humira and Imuran for now     12/9/2020   she had a colonoscopy done on 11/17/2020 which revealed extensive diffuse colitis  involving the colon up to the hepatic flexure from the rectum.  Dispersed inflammation noted from hyperflexion to the cecum.  Anaspaz normal.  Examined terminal ileum for about 15 cm from IC valve was normal. Pathology revealed acute severe colitis with crypt abscess.  Patient had chronic diarrhea more than 2 months. These findings are more in favor of ulcerative colitis. Recent TB Gold test was negative.  Chronic hepatitis panel was negative for any chronic hep C or hep B infection. She is currently on a tapering dose of steroids. She would definitely benefit from Biologics however at this time patient's insurance does not cover Remicade or the Humira or newer Biologics. We will start her on Apriso along with the Imuran and see how she does with that regimen.     4.  History of dysphagia unspecified  5.  Gastroesophageal reflux disease without esophagitis  No significant reflux symptoms or dysphagia.  Advised to take Prilosec as needed on demand.      Follow Up:   No follow-ups on file.    Susan Brizuela MD  [facility]  1/22/2024  16:35 EST     Please note that portions of this note may have been completed with a voice recognition program.

## 2024-01-24 ENCOUNTER — LAB (OUTPATIENT)
Dept: LAB | Facility: HOSPITAL | Age: 49
End: 2024-01-24
Payer: COMMERCIAL

## 2024-01-24 DIAGNOSIS — K51.00 ULCERATIVE PANCOLITIS: ICD-10-CM

## 2024-01-24 LAB
ALBUMIN SERPL-MCNC: 5 G/DL (ref 3.5–5.2)
ALBUMIN/GLOB SERPL: 2.1 G/DL
ALP SERPL-CCNC: 110 U/L (ref 39–117)
ALT SERPL W P-5'-P-CCNC: 33 U/L (ref 1–33)
ANION GAP SERPL CALCULATED.3IONS-SCNC: 12 MMOL/L (ref 5–15)
AST SERPL-CCNC: 33 U/L (ref 1–32)
BASOPHILS # BLD AUTO: 0.03 10*3/MM3 (ref 0–0.2)
BASOPHILS NFR BLD AUTO: 0.5 % (ref 0–1.5)
BILIRUB SERPL-MCNC: 0.4 MG/DL (ref 0–1.2)
BUN SERPL-MCNC: 9 MG/DL (ref 6–20)
BUN/CREAT SERPL: 11.4 (ref 7–25)
CALCIUM SPEC-SCNC: 9.9 MG/DL (ref 8.6–10.5)
CHLORIDE SERPL-SCNC: 103 MMOL/L (ref 98–107)
CO2 SERPL-SCNC: 25 MMOL/L (ref 22–29)
CREAT SERPL-MCNC: 0.79 MG/DL (ref 0.57–1)
CRP SERPL-MCNC: <0.3 MG/DL (ref 0–0.5)
DEPRECATED RDW RBC AUTO: 43.9 FL (ref 37–54)
EGFRCR SERPLBLD CKD-EPI 2021: 93.6 ML/MIN/1.73
EOSINOPHIL # BLD AUTO: 0.08 10*3/MM3 (ref 0–0.4)
EOSINOPHIL NFR BLD AUTO: 1.2 % (ref 0.3–6.2)
ERYTHROCYTE [DISTWIDTH] IN BLOOD BY AUTOMATED COUNT: 13.3 % (ref 12.3–15.4)
GLOBULIN UR ELPH-MCNC: 2.4 GM/DL
GLUCOSE SERPL-MCNC: 85 MG/DL (ref 65–99)
HCT VFR BLD AUTO: 37.2 % (ref 34–46.6)
HGB BLD-MCNC: 12.7 G/DL (ref 12–15.9)
IMM GRANULOCYTES # BLD AUTO: 0.01 10*3/MM3 (ref 0–0.05)
IMM GRANULOCYTES NFR BLD AUTO: 0.2 % (ref 0–0.5)
LYMPHOCYTES # BLD AUTO: 2 10*3/MM3 (ref 0.7–3.1)
LYMPHOCYTES NFR BLD AUTO: 30.3 % (ref 19.6–45.3)
MCH RBC QN AUTO: 31.4 PG (ref 26.6–33)
MCHC RBC AUTO-ENTMCNC: 34.1 G/DL (ref 31.5–35.7)
MCV RBC AUTO: 91.9 FL (ref 79–97)
MONOCYTES # BLD AUTO: 0.56 10*3/MM3 (ref 0.1–0.9)
MONOCYTES NFR BLD AUTO: 8.5 % (ref 5–12)
NEUTROPHILS NFR BLD AUTO: 3.92 10*3/MM3 (ref 1.7–7)
NEUTROPHILS NFR BLD AUTO: 59.3 % (ref 42.7–76)
NRBC BLD AUTO-RTO: 0 /100 WBC (ref 0–0.2)
PLATELET # BLD AUTO: 270 10*3/MM3 (ref 140–450)
PMV BLD AUTO: 11.8 FL (ref 6–12)
POTASSIUM SERPL-SCNC: 4.2 MMOL/L (ref 3.5–5.2)
PROT SERPL-MCNC: 7.4 G/DL (ref 6–8.5)
RBC # BLD AUTO: 4.05 10*6/MM3 (ref 3.77–5.28)
SODIUM SERPL-SCNC: 140 MMOL/L (ref 136–145)
WBC NRBC COR # BLD AUTO: 6.6 10*3/MM3 (ref 3.4–10.8)

## 2024-01-24 PROCEDURE — 36415 COLL VENOUS BLD VENIPUNCTURE: CPT

## 2024-01-24 PROCEDURE — 85025 COMPLETE CBC W/AUTO DIFF WBC: CPT

## 2024-01-24 PROCEDURE — 86140 C-REACTIVE PROTEIN: CPT

## 2024-01-24 PROCEDURE — 80053 COMPREHEN METABOLIC PANEL: CPT

## 2024-01-24 PROCEDURE — 80145 DRUG ASSAY ADALIMUMAB: CPT

## 2024-01-24 PROCEDURE — 82397 CHEMILUMINESCENT ASSAY: CPT

## 2024-01-31 LAB
ADALIMUMAB AB SERPL-MCNC: <25 NG/ML
ADALIMUMAB SERPL-MCNC: 16 UG/ML

## 2024-02-08 DIAGNOSIS — K51.00 ULCERATIVE PANCOLITIS: ICD-10-CM

## 2024-02-08 RX ORDER — ADALIMUMAB 40MG/0.8ML
KIT SUBCUTANEOUS
Qty: 4 EACH | Refills: 11 | Status: SHIPPED | OUTPATIENT
Start: 2024-02-08

## 2024-02-21 ENCOUNTER — HOSPITAL ENCOUNTER (OUTPATIENT)
Dept: MAMMOGRAPHY | Facility: HOSPITAL | Age: 49
Discharge: HOME | End: 2024-02-21
Admitting: PHYSICIAN ASSISTANT
Payer: COMMERCIAL

## 2024-02-21 DIAGNOSIS — Z12.31 BREAST CANCER SCREENING BY MAMMOGRAM: ICD-10-CM

## 2024-02-21 PROCEDURE — 77063 BREAST TOMOSYNTHESIS BI: CPT

## 2024-02-21 PROCEDURE — 77067 SCR MAMMO BI INCL CAD: CPT

## 2024-02-21 PROCEDURE — 77063 BREAST TOMOSYNTHESIS BI: CPT | Performed by: RADIOLOGY

## 2024-02-21 PROCEDURE — 77067 SCR MAMMO BI INCL CAD: CPT | Performed by: RADIOLOGY

## 2024-04-04 ENCOUNTER — LAB (OUTPATIENT)
Dept: LAB | Facility: HOSPITAL | Age: 49
End: 2024-04-04
Payer: COMMERCIAL

## 2024-04-04 ENCOUNTER — TRANSCRIBE ORDERS (OUTPATIENT)
Dept: LAB | Facility: HOSPITAL | Age: 49
End: 2024-04-04
Payer: COMMERCIAL

## 2024-04-04 DIAGNOSIS — E78.5 HYPERLIPIDEMIA, UNSPECIFIED HYPERLIPIDEMIA TYPE: ICD-10-CM

## 2024-04-04 DIAGNOSIS — E78.5 HYPERLIPIDEMIA, UNSPECIFIED HYPERLIPIDEMIA TYPE: Primary | ICD-10-CM

## 2024-04-04 LAB
CHOLEST SERPL-MCNC: 149 MG/DL (ref 0–200)
HDLC SERPL-MCNC: 42 MG/DL (ref 40–60)
LDLC SERPL CALC-MCNC: 82 MG/DL (ref 0–100)
LDLC/HDLC SERPL: 1.88 {RATIO}
TRIGL SERPL-MCNC: 140 MG/DL (ref 0–150)
TSH SERPL DL<=0.05 MIU/L-ACNC: 1.06 UIU/ML (ref 0.27–4.2)
VLDLC SERPL-MCNC: 25 MG/DL (ref 5–40)

## 2024-04-04 PROCEDURE — 84443 ASSAY THYROID STIM HORMONE: CPT

## 2024-04-04 PROCEDURE — 36415 COLL VENOUS BLD VENIPUNCTURE: CPT

## 2024-04-04 PROCEDURE — 80061 LIPID PANEL: CPT

## 2024-05-29 ENCOUNTER — TELEPHONE (OUTPATIENT)
Dept: GASTROENTEROLOGY | Facility: CLINIC | Age: 49
End: 2024-05-29
Payer: COMMERCIAL

## 2024-05-29 NOTE — TELEPHONE ENCOUNTER
Patient would like to know when it is ok to start her Humira back. She said for the last couple weeks she has been off it due to having shingles.

## 2024-06-04 ENCOUNTER — TELEPHONE (OUTPATIENT)
Dept: GASTROENTEROLOGY | Facility: CLINIC | Age: 49
End: 2024-06-04
Payer: COMMERCIAL

## 2024-06-04 NOTE — TELEPHONE ENCOUNTER
I did the PA and let the patient know that it was approved.       ----- Message from Georgina GANDHI sent at 6/4/2024  3:43 PM EDT -----  [facility] called today regarding Humira. The PA is running out on it. She said if you prefer they can do the PA or we can do it.      If you want them to do it you can fax the clinicals to [phone]

## 2024-07-02 ENCOUNTER — OFFICE VISIT (OUTPATIENT)
Dept: GASTROENTEROLOGY | Facility: CLINIC | Age: 49
End: 2024-07-02

## 2024-07-02 ENCOUNTER — LAB (OUTPATIENT)
Dept: LAB | Facility: HOSPITAL | Age: 49
End: 2024-07-02

## 2024-07-02 VITALS
HEIGHT: 64 IN | DIASTOLIC BLOOD PRESSURE: 77 MMHG | WEIGHT: 161 LBS | BODY MASS INDEX: 27.49 KG/M2 | SYSTOLIC BLOOD PRESSURE: 109 MMHG | HEART RATE: 88 BPM | TEMPERATURE: 97.8 F

## 2024-07-02 DIAGNOSIS — K51.00 ULCERATIVE PANCOLITIS: Primary | ICD-10-CM

## 2024-07-02 DIAGNOSIS — K51.00 ULCERATIVE PANCOLITIS: ICD-10-CM

## 2024-07-02 DIAGNOSIS — R11.0 NAUSEA: ICD-10-CM

## 2024-07-02 DIAGNOSIS — K21.9 GASTROESOPHAGEAL REFLUX DISEASE WITHOUT ESOPHAGITIS: ICD-10-CM

## 2024-07-02 DIAGNOSIS — Z86.2 HISTORY OF IRON DEFICIENCY ANEMIA: ICD-10-CM

## 2024-07-02 DIAGNOSIS — R74.8 ELEVATED LIVER ENZYMES: ICD-10-CM

## 2024-07-02 LAB
ALBUMIN SERPL-MCNC: 4.6 G/DL (ref 3.5–5.2)
ALBUMIN/GLOB SERPL: 1.6 G/DL
ALP SERPL-CCNC: 93 U/L (ref 39–117)
ALT SERPL W P-5'-P-CCNC: 23 U/L (ref 1–33)
ANION GAP SERPL CALCULATED.3IONS-SCNC: 10 MMOL/L (ref 5–15)
AST SERPL-CCNC: 29 U/L (ref 1–32)
BILIRUB SERPL-MCNC: 0.2 MG/DL (ref 0–1.2)
BUN SERPL-MCNC: 9 MG/DL (ref 6–20)
BUN/CREAT SERPL: 12.2 (ref 7–25)
CALCIUM SPEC-SCNC: 9.6 MG/DL (ref 8.6–10.5)
CHLORIDE SERPL-SCNC: 105 MMOL/L (ref 98–107)
CO2 SERPL-SCNC: 27 MMOL/L (ref 22–29)
CREAT SERPL-MCNC: 0.74 MG/DL (ref 0.57–1)
DEPRECATED RDW RBC AUTO: 44.6 FL (ref 37–54)
EGFRCR SERPLBLD CKD-EPI 2021: 101.2 ML/MIN/1.73
ERYTHROCYTE [DISTWIDTH] IN BLOOD BY AUTOMATED COUNT: 13.3 % (ref 12.3–15.4)
GLOBULIN UR ELPH-MCNC: 2.8 GM/DL
GLUCOSE SERPL-MCNC: 73 MG/DL (ref 65–99)
HCT VFR BLD AUTO: 37.8 % (ref 34–46.6)
HGB BLD-MCNC: 12.7 G/DL (ref 12–15.9)
MCH RBC QN AUTO: 31.2 PG (ref 26.6–33)
MCHC RBC AUTO-ENTMCNC: 33.6 G/DL (ref 31.5–35.7)
MCV RBC AUTO: 92.9 FL (ref 79–97)
PLATELET # BLD AUTO: 255 10*3/MM3 (ref 140–450)
PMV BLD AUTO: 12.1 FL (ref 6–12)
POTASSIUM SERPL-SCNC: 4 MMOL/L (ref 3.5–5.2)
PROT SERPL-MCNC: 7.4 G/DL (ref 6–8.5)
RBC # BLD AUTO: 4.07 10*6/MM3 (ref 3.77–5.28)
SODIUM SERPL-SCNC: 142 MMOL/L (ref 136–145)
WBC NRBC COR # BLD AUTO: 6.11 10*3/MM3 (ref 3.4–10.8)

## 2024-07-02 PROCEDURE — 99214 OFFICE O/P EST MOD 30 MIN: CPT | Performed by: INTERNAL MEDICINE

## 2024-07-02 PROCEDURE — 36415 COLL VENOUS BLD VENIPUNCTURE: CPT

## 2024-07-02 PROCEDURE — 80053 COMPREHEN METABOLIC PANEL: CPT

## 2024-07-02 PROCEDURE — 85027 COMPLETE CBC AUTOMATED: CPT | Performed by: INTERNAL MEDICINE

## 2024-07-02 RX ORDER — ONDANSETRON 4 MG/1
4 TABLET, ORALLY DISINTEGRATING ORAL EVERY 12 HOURS PRN
Qty: 30 TABLET | Refills: 1 | Status: SHIPPED | OUTPATIENT
Start: 2024-07-02

## 2024-07-02 NOTE — PROGRESS NOTES
Follow Up Note     Date: 2024   Patient Name: Stacy Guardado  MRN: [id number]  : 1975     Referring Physician: Shayna Swartz PA    Chief Complaint:    Chief Complaint   Patient presents with    Ulcerative Colitis    Heartburn    Anemia     Hx of          Interval History:   2024  Stacy Guardado is a 48 y.o. female who is here today for follow up for her ulcerative colitis.  She states that she did have a shingles recently that cleared now.  Has been having daily bowel movement without any significant abdominal symptoms she is currently taking Imuran 50 mg p.o. daily.  She gets occasional episodes of nausea.     11/10/2020  She states that she noted to have red blood in stool three weeks ago and that got worse with loose stool.She also develeloped LLQ abdominal pain. Loose stool 5-7 times daily. Deny any fever chills. Patient was in emergency room 2 weeks ago with complaints of diarrhea abdominal pain and blood in the stool.  She had basic lab work done which revealed borderline leukocytosis.  She deny any prior constipation. She also had a CT scan of the abdomen pelvis done which revealed thickening in the descending colon the sigmoid suggesting colitis. She was given Augmentin for ten days.    Weight is stable. Pt denies nausea vomiting or odynophagia or dysphagia. There is no history of acid reflux. There is no history of anemia. No prior history of EGD or colonoscopy. No family history of colon cancer or any GI malignancy, or IBD. No history of any abdominal surgery. Denies alcohol abuse or cigarette smoking.    Subjective      Past Medical History:   Past Medical History:   Diagnosis Date    Abnormal ECG     tachycardia    Bipolar affective     COPD (chronic obstructive pulmonary disease)     Diverticulitis     Elevated cholesterol     Multiple body piercings     ears x 5    Palpitations     Tattoos 2020    x's 3    Ulcerative colitis      Past Surgical History:    Past Surgical History:   Procedure Laterality Date    COLONOSCOPY N/A 2020    Procedure: COLONOSCOPY WITH BIOPSIES;  Surgeon: Susan Brizuela MD;  Location: [facility];  Service: Gastroenterology;  Laterality: N/A;    COLONOSCOPY N/A 2021    Procedure: COLONOSCOPY with biopsy,  polypectomy, endoscopic with hot snare polypectomy mucosal resection with injection of normal saline soft colaguation with clip placement x 3;  Surgeon: Susan Brizuela MD;  Location: [facility];  Service: Gastroenterology;  Laterality: N/A;    COLONOSCOPY N/A 2023    Procedure: COLONOSCOPY with biopsy and polypectomy;  Surgeon: Susan Brizuela MD;  Location: [facility];  Service: Gastroenterology;  Laterality: N/A;    ENDOSCOPY N/A 2021    Procedure: ESOPHAGOGASTRODUODENOSCOPY WITH BIOPSY ;  Surgeon: Susan Brizuela MD;  Location: [facility];  Service: Gastroenterology;  Laterality: N/A;    WISDOM TOOTH EXTRACTION         Family History:   Family History   Problem Relation Age of Onset    Hyperlipidemia Mother     Hyperlipidemia Father     Hypertension Father     Stroke Brother     Hyperlipidemia Brother     Heart attack Maternal Grandfather     Heart disease Maternal Grandfather     Heart disease Paternal Grandfather     Heart attack Paternal Grandfather     Alcohol abuse Other     Diabetes Other     Hypertension Other     Stroke Other     Colon cancer Neg Hx     Cirrhosis Neg Hx     Liver cancer Neg Hx     Liver disease Neg Hx     Breast cancer Neg Hx     Ovarian cancer Neg Hx        Social History:   Social History     Socioeconomic History    Marital status:    Tobacco Use    Smoking status: Former     Current packs/day: 0.00     Average packs/day: 1 pack/day for 10.0 years (10.0 ttl pk-yrs)     Types: Cigarettes     Start date: 2009     Quit date: 2019     Years since quittin.1    Smokeless tobacco: Never   Vaping Use    Vaping status: Some  Days    Last attempt to quit: 3/18/2020    Substances: Nicotine, Flavoring    Devices: Pre-filled or refillable cartridge, Refillable tank   Substance and Sexual Activity    Alcohol use: Not Currently    Drug use: No    Sexual activity: Defer       Medications:     Current Outpatient Medications:     Adalimumab (Humira, 2 Pen,) 40 MG/0.8ML Pen-injector Kit, INJECT 1 PEN (40 MG) UNDER THE SKIN ONCE WEEKLY, Disp: 4 each, Rfl: 11    AMOXICILLIN PO, Take  by mouth 2 (Two) Times a Day., Disp: , Rfl:     atorvastatin (LIPITOR) 40 MG tablet, Take 1 tablet by mouth Daily., Disp: , Rfl:     azaTHIOprine (IMURAN) 50 MG tablet, Take 1 tablet by mouth Daily., Disp: 90 tablet, Rfl: 1    cetirizine (zyrTEC) 10 MG tablet, Take 1 tablet by mouth Daily., Disp: , Rfl:     Pediatric Multivitamins-Fl (Multiple Vitamins/Fluoride) 1 MG chewable tablet, Chew 1 capsule Daily., Disp: , Rfl:     Probiotic Product (Probiotic Daily) capsule, Take  by mouth Daily., Disp: , Rfl:     omeprazole (priLOSEC) 20 MG capsule, Take 1 capsule by mouth 2 (Two) Times a Day. (Patient not taking: Reported on 7/2/2024), Disp: , Rfl:     Allergies:   Allergies   Allergen Reactions    Augmentin [Amoxicillin-Pot Clavulanate] Diarrhea and GI Intolerance       Review of Systems:   Review of Systems   Constitutional:  Negative for appetite change, fatigue, fever and unexpected weight loss.   HENT:  Negative for trouble swallowing.    Gastrointestinal:  Positive for anal bleeding and nausea. Negative for abdominal distention, abdominal pain, blood in stool, constipation, diarrhea, rectal pain, vomiting, GERD and indigestion.       The following portions of the patient's history were reviewed and updated as appropriate: allergies, current medications, past family history, past medical history, past social history, past surgical history and problem list.    Objective     Physical Exam:  Vital Signs:   Vitals:    07/02/24 1523   BP: 109/77   Pulse: 88   Temp: 97.8 °F  "(36.6 °C)   TempSrc: Infrared   Weight: 73 kg (161 lb)  Comment: with clothing/shoes   Height: 162.6 cm (64\")  Comment: per EPIC       Physical Exam  Constitutional:       Appearance: Normal appearance.   HENT:      Head: Normocephalic and atraumatic.   Eyes:      Conjunctiva/sclera: Conjunctivae normal.   Abdominal:      General: Abdomen is flat. There is no distension.      Palpations: There is no mass.      Tenderness: There is no abdominal tenderness. There is no guarding or rebound.      Hernia: No hernia is present.   Musculoskeletal:      Cervical back: Normal range of motion and neck supple.   Neurological:      Mental Status: She is alert.         Results Review:   I reviewed the patient's new clinical results.    Lab on 04/04/2024   Component Date Value Ref Range Status    TSH 04/04/2024 1.060  0.270 - 4.200 uIU/mL Final    Total Cholesterol 04/04/2024 149  0 - 200 mg/dL Final    Triglycerides 04/04/2024 140  0 - 150 mg/dL Final    HDL Cholesterol 04/04/2024 42  40 - 60 mg/dL Final    LDL Cholesterol  04/04/2024 82  0 - 100 mg/dL Final    VLDL Cholesterol 04/04/2024 25  5 - 40 mg/dL Final    LDL/HDL Ratio 04/04/2024 1.88   Final      No radiology results for the last 90 days.    colonoscopy  9/21/2023  - Diverticulosis in the sigmoid colon and in the descending colon.  - Inactive (Luu Score 0) pancolitis ulcerative colitis, improved since the last examination. Biopsied.  - Multipel large pesudopolyps were found in the sigmoid colon, descending colon. Biopsied. No alarming features with NBI  - The examined portion of the ileum was normal. Biopsied  - Endoscopically no active disease and on endoscopic remission     DIAGNOSIS:  A. CECUM BIOPSY, AND ASCENDING:  Colonic type mucosa with no significant histopathologic abnormalities  B. SIGMOID COLON POLYP, PSEUDO:  Benign mucosal fold, multiple deeper levels evaluated  C. TRANSVERSE COLON BIOPSY:  Colonic type mucosa with no significant histopathologic " abnormalities  D. DESCENDING COLON BIOPSY:  Colonic type mucosa with no significant histopathologic abnormalities  E. SIGMOID COLON BIOPSY:  Colonic type mucosa with no significant histopathologic abnormalities  F. RECTAL BIOPSY:  Colonic type mucosa with no significant histopathologic abnormalities  G. TERMINAL ILEUM BIOPSY:  Small intestinal mucosa with no significant histopathologic abnormalities  H. CECUM POLYP, EMR EDGE:  Benign mucosal fold, multiple deeper levels evaluated  Assessment / Plan      1. Ulcerative pancolitis on remission  2. History of iron deficiency anemia  3.  Elevated liver enzymes  7/2/2024  Overall she is doing well.  She had a recent shingles.  We discussed done shingles vaccine patient at this time does not want to get the shingles vaccine and she will think on that.  Her Humira trough level done on 01/24/2024 reveals a drug level of 16 with the antibodies less than 25.  She is currently on Imuran 50 mg p.o. daily since 6 months.  Her lab work on 1/24/2024 did reveal borderline elevated AST of 33 normal ALT 33 alkaline phosphatase 110 with a T. bili of 0.4.  This could be from statins or Humira.  She may also have a mild early fatty liver disease.    As we planned we will discontinue her Imuran  Shingles vaccine in the near future  Continue Humira 40 mg subcu weekly  Zofran 4 mg sublingual every 12 hours as needed for nausea  Monitor LFTs every 6 months  Will consider small bowel PillCam study in the future if there is any anemia or further flare of symptoms.  Follow-up in 6 months with lab work  Repeat colonoscopy in 2026-May consider chromoendoscopy    1/22/2024  Colonoscopy done on 9/21/2023 did not reveal any endoscopic signs of colitis or ileitis.  Chronic biopsies and ileal biopsies were normal.  Patient did have a pseudopolyps in the sigmoid colon and the descending colon.  Patient is in a clinical, endoscopic, microscopic remission.  Last lab work was in July 2023 which revealed  normal CMP with borderline glucose of 117.  CBC was normal.  Her last Humira level on 1/10/2023 was 9.1 with antibodies less than 25. Patient is currently on Humira weekly due to low therapeutic Humira level.      7/14/2022  She is doing very well and appears to be in a clinical remission. She is currently taking Humira 40 mg subcu q. weekly along with Imuran 100 mg p.o. daily.  Mesalamine was discontinued last visit.  Her hemoglobin is normal now. Calprotectin normalized to 97 from over 1000.  CBC CMP unremarkable with normal hemoglobin now 12.7 g/dL adalimumab level low at 2.5 with the antibodies less than 25. She has not started her Humira weekly doses yet.  Due to her low Humira trough level her Humira dose interval was reduced to 40 mg subcu weekly.  We will discontinue her Apriso.  We will continue with the Humira and Imuran for now     12/9/2020   she had a colonoscopy done on 11/17/2020 which revealed extensive diffuse colitis involving the colon up to the hepatic flexure from the rectum.  Dispersed inflammation noted from hyperflexion to the cecum.  Anaspaz normal.  Examined terminal ileum for about 15 cm from IC valve was normal. Pathology revealed acute severe colitis with crypt abscess.  Patient had chronic diarrhea more than 2 months. These findings are more in favor of ulcerative colitis. Recent TB Gold test was negative.  Chronic hepatitis panel was negative for any chronic hep C or hep B infection. She is currently on a tapering dose of steroids. She would definitely benefit from Biologics however at this time patient's insurance does not cover Remicade or the Humira or newer Biologics. We will start her on Apriso along with the Imuran and see how she does with that regimen.      4.  History of dysphagia unspecified  5.  Gastroesophageal reflux disease without esophagitis  No current reflux symptoms or dysphagia.  Will continue PPI as needed    Prior history  6. History of C. difficile  colitis          Follow Up:   No follow-ups on file.    Susan Brizuela MD  [facility]  7/2/2024  15:25 EDT     Please note that portions of this note may have been completed with a voice recognition program.

## 2025-01-06 ENCOUNTER — TRANSCRIBE ORDERS (OUTPATIENT)
Dept: ADMINISTRATIVE | Facility: HOSPITAL | Age: 50
End: 2025-01-06

## 2025-01-06 DIAGNOSIS — Z12.31 BREAST CANCER SCREENING BY MAMMOGRAM: Primary | ICD-10-CM

## 2025-01-28 ENCOUNTER — OFFICE VISIT (OUTPATIENT)
Dept: GASTROENTEROLOGY | Facility: CLINIC | Age: 50
End: 2025-01-28
Payer: COMMERCIAL

## 2025-01-28 VITALS
WEIGHT: 162 LBS | DIASTOLIC BLOOD PRESSURE: 70 MMHG | SYSTOLIC BLOOD PRESSURE: 120 MMHG | HEART RATE: 92 BPM | BODY MASS INDEX: 27.66 KG/M2 | OXYGEN SATURATION: 98 % | HEIGHT: 64 IN | TEMPERATURE: 98.4 F

## 2025-01-28 DIAGNOSIS — T50.905A ADVERSE EFFECT OF DRUG, INITIAL ENCOUNTER: ICD-10-CM

## 2025-01-28 DIAGNOSIS — Z86.2 HISTORY OF IRON DEFICIENCY ANEMIA: ICD-10-CM

## 2025-01-28 DIAGNOSIS — K51.00 ULCERATIVE PANCOLITIS: Primary | ICD-10-CM

## 2025-01-28 RX ORDER — SODIUM CHLORIDE 9 MG/ML
20 INJECTION, SOLUTION INTRAVENOUS ONCE
OUTPATIENT
Start: 2025-02-17

## 2025-01-28 RX ORDER — ACETAMINOPHEN 325 MG/1
650 TABLET ORAL ONCE
OUTPATIENT
Start: 2025-02-17

## 2025-01-28 RX ORDER — DIPHENHYDRAMINE HCL 25 MG
25 CAPSULE ORAL ONCE
OUTPATIENT
Start: 2025-02-17

## 2025-01-28 NOTE — PROGRESS NOTES
Follow Up Note     Date: 2025   Patient Name: Stacy Guardado  MRN: [id number]  : 1975     Referring Physician: Shayna Swartz PA    Chief Complaint:    Chief Complaint   Patient presents with    Ulcerative Colitis    Nausea    Elevated Hepatic Enzymes    HX TOMI    Heartburn       Interval History:   2025  Stacy Guardado is a 49 y.o. female who is here today for follow up for her ulcerative colitis.  She states that her insurance changed and it did not cover her Humira and she did not get her Humira since last 1 month.  She is also having significant issue with the psoriasis involving both the hands while on Humira.  She tried everything at dermatology which is not helping however since stopping the Humira her psoriasis plaques improved.  She has been having regular bowel movement.    11/10/2020  She states that she noted to have red blood in stool three weeks ago and that got worse with loose stool.She also develeloped LLQ abdominal pain. Loose stool 5-7 times daily. Deny any fever chills. Patient was in emergency room 2 weeks ago with complaints of diarrhea abdominal pain and blood in the stool.  She had basic lab work done which revealed borderline leukocytosis.  She deny any prior constipation. She also had a CT scan of the abdomen pelvis done which revealed thickening in the descending colon the sigmoid suggesting colitis. She was given Augmentin for ten days. Weight is stable. Pt denies nausea vomiting or odynophagia or dysphagia. There is no history of acid reflux. There is no history of anemia. No prior history of EGD or colonoscopy. No family history of colon cancer or any GI malignancy, or IBD. No history of any abdominal surgery. Denies alcohol abuse or cigarette smoking.    Subjective      Past Medical History:   Past Medical History:   Diagnosis Date    Abnormal ECG     tachycardia    Bipolar affective     COPD (chronic obstructive pulmonary disease)      Diverticulitis     Elevated cholesterol     Multiple body piercings     ears x 5    Palpitations     Tattoos 11/12/2020    x's 3    Ulcerative colitis      Past Surgical History:   Past Surgical History:   Procedure Laterality Date    COLONOSCOPY N/A 11/17/2020    Procedure: COLONOSCOPY WITH BIOPSIES;  Surgeon: Susan Brizuela MD;  [facility];  Service: Gastroenterology;  Laterality: N/A;    COLONOSCOPY N/A 09/17/2021    Procedure: COLONOSCOPY with biopsy,  polypectomy, endoscopic with hot snare polypectomy mucosal resection with injection of normal saline soft colaguation with clip placement x 3;  Surgeon: Susan Brizuela MD;  [facility];  Service: Gastroenterology;  Laterality: N/A;    COLONOSCOPY N/A 9/21/2023    Procedure: COLONOSCOPY with biopsy and polypectomy;  Surgeon: Susan Brizuela MD;  [facility];  Service: Gastroenterology;  Laterality: N/A;    ENDOSCOPY N/A 08/02/2021    Procedure: ESOPHAGOGASTRODUODENOSCOPY WITH BIOPSY ;  Surgeon: Susan Brizuela MD;  [facility];  Service: Gastroenterology;  Laterality: N/A;    WISDOM TOOTH EXTRACTION         Family History:   Family History   Problem Relation Age of Onset    Hyperlipidemia Mother     Hyperlipidemia Father     Hypertension Father     Stroke Brother     Hyperlipidemia Brother     Heart attack Maternal Grandfather     Heart disease Maternal Grandfather     Heart disease Paternal Grandfather     Heart attack Paternal Grandfather     Alcohol abuse Other     Diabetes Other     Hypertension Other     Stroke Other     Colon cancer Neg Hx     Cirrhosis Neg Hx     Liver cancer Neg Hx     Liver disease Neg Hx     Breast cancer Neg Hx     Ovarian cancer Neg Hx        Social History:   Social History     Socioeconomic History    Marital status:    Tobacco Use    Smoking status: Former     Current packs/day: 0.00     Average packs/day: 1 pack/day for 10.0 years (10.0  ttl pk-yrs)     Types: Cigarettes     Start date: 2009     Quit date: 2019     Years since quittin.7    Smokeless tobacco: Never   Vaping Use    Vaping status: Some Days    Last attempt to quit: 3/18/2020    Substances: Nicotine, Flavoring    Devices: Pre-filled or refillable cartridge, Refillable tank   Substance and Sexual Activity    Alcohol use: Not Currently    Drug use: No    Sexual activity: Defer       Medications:     Current Outpatient Medications:     atorvastatin (LIPITOR) 40 MG tablet, Take 1 tablet by mouth Daily., Disp: , Rfl:     cetirizine (zyrTEC) 10 MG tablet, Take 1 tablet by mouth Daily., Disp: , Rfl:     Multiple Vitamins-Minerals (ALIVE MULTI-VITAMIN PO), Take  by mouth., Disp: , Rfl:     omeprazole (priLOSEC) 20 MG capsule, Take 1 capsule by mouth 2 (Two) Times a Day., Disp: , Rfl:     ondansetron ODT (ZOFRAN-ODT) 4 MG disintegrating tablet, Take 1 tablet by mouth Every 12 (Twelve) Hours As Needed for Nausea or Vomiting., Disp: 30 tablet, Rfl: 1    Probiotic Product (Probiotic Daily) capsule, Take  by mouth Daily., Disp: , Rfl:     Adalimumab (Humira, 2 Pen,) 40 MG/0.8ML Pen-injector Kit, INJECT 1 PEN (40 MG) UNDER THE SKIN ONCE WEEKLY (Patient not taking: Reported on 2025), Disp: 4 each, Rfl: 11    Allergies:   Allergies   Allergen Reactions    Augmentin [Amoxicillin-Pot Clavulanate] Diarrhea and GI Intolerance       Review of Systems:   Review of Systems   Constitutional:  Negative for appetite change, fatigue, fever and unexpected weight loss.   HENT:  Negative for trouble swallowing.    Gastrointestinal:  Positive for diarrhea (if she eats something that affects her). Negative for abdominal distention, abdominal pain, anal bleeding, blood in stool, constipation, nausea, rectal pain, vomiting, GERD and indigestion.       The following portions of the patient's history were reviewed and updated as appropriate: allergies, current medications, past family history, past  "medical history, past social history, past surgical history and problem list.    Objective     Physical Exam:  Vital Signs:   Vitals:    01/28/25 1601   BP: 120/70   Pulse: 92   Temp: 98.4 °F (36.9 °C)   TempSrc: Infrared   SpO2: 98%   Weight: 73.5 kg (162 lb)   Height: 162.6 cm (64\")       Physical Exam  Constitutional:       Appearance: Normal appearance.   HENT:      Head: Normocephalic and atraumatic.   Eyes:      Conjunctiva/sclera: Conjunctivae normal.   Abdominal:      General: Abdomen is flat. There is no distension.      Palpations: There is no mass.      Tenderness: There is no abdominal tenderness. There is no guarding or rebound.      Hernia: No hernia is present.   Musculoskeletal:      Cervical back: Normal range of motion and neck supple.   Neurological:      Mental Status: She is alert.         Results Review:   I reviewed the patient's new clinical results.    No visits with results within 90 Day(s) from this visit.   Latest known visit with results is:   Lab on 07/02/2024   Component Date Value Ref Range Status    Glucose 07/02/2024 73  65 - 99 mg/dL Final    BUN 07/02/2024 9  6 - 20 mg/dL Final    Creatinine 07/02/2024 0.74  0.57 - 1.00 mg/dL Final    Sodium 07/02/2024 142  136 - 145 mmol/L Final    Potassium 07/02/2024 4.0  3.5 - 5.2 mmol/L Final    Chloride 07/02/2024 105  98 - 107 mmol/L Final    CO2 07/02/2024 27.0  22.0 - 29.0 mmol/L Final    Calcium 07/02/2024 9.6  8.6 - 10.5 mg/dL Final    Total Protein 07/02/2024 7.4  6.0 - 8.5 g/dL Final    Albumin 07/02/2024 4.6  3.5 - 5.2 g/dL Final    ALT (SGPT) 07/02/2024 23  1 - 33 U/L Final    AST (SGOT) 07/02/2024 29  1 - 32 U/L Final    Alkaline Phosphatase 07/02/2024 93  39 - 117 U/L Final    Total Bilirubin 07/02/2024 0.2  0.0 - 1.2 mg/dL Final    Globulin 07/02/2024 2.8  gm/dL Final    A/G Ratio 07/02/2024 1.6  g/dL Final    BUN/Creatinine Ratio 07/02/2024 12.2  7.0 - 25.0 Final    Anion Gap 07/02/2024 10.0  5.0 - 15.0 mmol/L Final    eGFR " 07/02/2024 101.2  >60.0 mL/min/1.73 Final      No radiology results for the last 90 days.    colonoscopy  9/21/2023  - Diverticulosis in the sigmoid colon and in the descending colon.  - Inactive (Luu Score 0) pancolitis ulcerative colitis, improved since the last examination. Biopsied.  - Multipel large pesudopolyps were found in the sigmoid colon, descending colon. Biopsied. No alarming features with NBI  - The examined portion of the ileum was normal. Biopsied  - Endoscopically no active disease and on endoscopic remission     DIAGNOSIS:  A. CECUM BIOPSY, AND ASCENDING:  Colonic type mucosa with no significant histopathologic abnormalities  B. SIGMOID COLON POLYP, PSEUDO:  Benign mucosal fold, multiple deeper levels evaluated  C. TRANSVERSE COLON BIOPSY:  Colonic type mucosa with no significant histopathologic abnormalities  D. DESCENDING COLON BIOPSY:  Colonic type mucosa with no significant histopathologic abnormalities  E. SIGMOID COLON BIOPSY:  Colonic type mucosa with no significant histopathologic abnormalities  F. RECTAL BIOPSY:  Colonic type mucosa with no significant histopathologic abnormalities  G. TERMINAL ILEUM BIOPSY:  Small intestinal mucosa with no significant histopathologic abnormalities  H. CECUM POLYP, EMR EDGE:  Benign mucosal fold, multiple deeper levels evaluated    Assessment / Plan      1. Ulcerative pancolitis on remission  2. History of iron deficiency anemia  3. 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  4.  History of elevated liver enzymes  1/28/2025  Her last lab work on 7/2/2024 showed normal CMP..  CBC normal with normal hemoglobin 12.7.  Her enzymes normal now.  Patient has a significant psoriatic plaques involving the both the hands likely secondary to Humira use as she did not have any skin lesions before..  Not responding to topical therapy by dermatology.  Her insurance changed and she is not taking her Humira since 4 weeks and her rash improving.  Given her  significant Humira induced psoriatic plaques, we decided to discontinue Humira.    Will get an approval for Stelara 390mg iv x 1 fllowed by 90mg sc q 8 w;   Stelara may also help on her psoriatic plaques.  Repeat colonoscopy in 2026-May consider chromoendoscopy  For now I have advised her to continue with mesalamine which she has at home until Biologics approved     1/22/2024  Colonoscopy done on 9/21/2023 did not reveal any endoscopic signs of colitis or ileitis.  Chronic biopsies and ileal biopsies were normal.  Patient did have a pseudopolyps in the sigmoid colon and the descending colon.  Patient is in a clinical, endoscopic, microscopic remission.  Last lab work was in July 2023 which revealed normal CMP with borderline glucose of 117.  CBC was normal.  Her last Humira level on 1/10/2023 was 9.1 with antibodies less than 25. Patient is currently on Humira weekly due to low therapeutic Humira level.      7/14/2022  She is doing very well and appears to be in a clinical remission. She is currently taking Humira 40 mg subcu q. weekly along with Imuran 100 mg p.o. daily.  Mesalamine was discontinued last visit.  Her hemoglobin is normal now. Calprotectin normalized to 97 from over 1000.  CBC CMP unremarkable with normal hemoglobin now 12.7 g/dL adalimumab level low at 2.5 with the antibodies less than 25. She has not started her Humira weekly doses yet.  Due to her low Humira trough level her Humira dose interval was reduced to 40 mg subcu weekly.  We will discontinue her Apriso.  We will continue with the Humira and Imuran for now     12/9/2020   she had a colonoscopy done on 11/17/2020 which revealed extensive diffuse colitis involving the colon up to the hepatic flexure from the rectum.  Dispersed inflammation noted from hyperflexion to the cecum.  Anaspaz normal.  Examined terminal ileum for about 15 cm from IC valve was normal. Pathology revealed acute severe colitis with crypt abscess.  Patient had chronic  diarrhea more than 2 months. These findings are more in favor of ulcerative colitis. Recent TB Gold test was negative.  Chronic hepatitis panel was negative for any chronic hep C or hep B infection. She is currently on a tapering dose of steroids. She would definitely benefit from Biologics however at this time patient's insurance does not cover Remicade or the Humira or newer Biologics. We will start her on Apriso along with the Imuran and see how she does with that regimen.      5.  History of dysphagia unspecified  6.  Gastroesophageal reflux disease without esophagitis  No significant symptoms now,  we will continue PPI as needed     Prior history  7. History of C. difficile colitis      Follow Up:   No follow-ups on file.    Susan Brizuela MD  [facility]  1/28/2025  16:03 EST     Please note that portions of this note may have been completed with a voice recognition program.

## 2025-01-29 ENCOUNTER — TELEPHONE (OUTPATIENT)
Dept: GASTROENTEROLOGY | Facility: CLINIC | Age: 50
End: 2025-01-29
Payer: COMMERCIAL

## 2025-01-29 ENCOUNTER — LAB (OUTPATIENT)
Dept: LAB | Facility: HOSPITAL | Age: 50
End: 2025-01-29
Payer: COMMERCIAL

## 2025-01-29 DIAGNOSIS — K51.00 ULCERATIVE PANCOLITIS: ICD-10-CM

## 2025-01-29 LAB
ALBUMIN SERPL-MCNC: 4.2 G/DL (ref 3.5–5.2)
ALBUMIN/GLOB SERPL: 1.3 G/DL
ALP SERPL-CCNC: 105 U/L (ref 39–117)
ALT SERPL W P-5'-P-CCNC: 39 U/L (ref 1–33)
ANION GAP SERPL CALCULATED.3IONS-SCNC: 9.4 MMOL/L (ref 5–15)
AST SERPL-CCNC: 32 U/L (ref 1–32)
BASOPHILS # BLD AUTO: 0.03 10*3/MM3 (ref 0–0.2)
BASOPHILS NFR BLD AUTO: 0.4 % (ref 0–1.5)
BILIRUB SERPL-MCNC: 0.2 MG/DL (ref 0–1.2)
BUN SERPL-MCNC: 8 MG/DL (ref 6–20)
BUN/CREAT SERPL: 9.8 (ref 7–25)
CALCIUM SPEC-SCNC: 9.4 MG/DL (ref 8.6–10.5)
CHLORIDE SERPL-SCNC: 104 MMOL/L (ref 98–107)
CO2 SERPL-SCNC: 24.6 MMOL/L (ref 22–29)
CREAT SERPL-MCNC: 0.82 MG/DL (ref 0.57–1)
DEPRECATED RDW RBC AUTO: 43 FL (ref 37–54)
EGFRCR SERPLBLD CKD-EPI 2021: 88.9 ML/MIN/1.73
EOSINOPHIL # BLD AUTO: 0.09 10*3/MM3 (ref 0–0.4)
EOSINOPHIL NFR BLD AUTO: 1.1 % (ref 0.3–6.2)
ERYTHROCYTE [DISTWIDTH] IN BLOOD BY AUTOMATED COUNT: 12.8 % (ref 12.3–15.4)
GLOBULIN UR ELPH-MCNC: 3.3 GM/DL
GLUCOSE SERPL-MCNC: 96 MG/DL (ref 65–99)
HCT VFR BLD AUTO: 40.1 % (ref 34–46.6)
HGB BLD-MCNC: 13.2 G/DL (ref 12–15.9)
IMM GRANULOCYTES # BLD AUTO: 0.02 10*3/MM3 (ref 0–0.05)
IMM GRANULOCYTES NFR BLD AUTO: 0.2 % (ref 0–0.5)
LYMPHOCYTES # BLD AUTO: 1.38 10*3/MM3 (ref 0.7–3.1)
LYMPHOCYTES NFR BLD AUTO: 16.3 % (ref 19.6–45.3)
MCH RBC QN AUTO: 30.3 PG (ref 26.6–33)
MCHC RBC AUTO-ENTMCNC: 32.9 G/DL (ref 31.5–35.7)
MCV RBC AUTO: 92 FL (ref 79–97)
MONOCYTES # BLD AUTO: 0.69 10*3/MM3 (ref 0.1–0.9)
MONOCYTES NFR BLD AUTO: 8.1 % (ref 5–12)
NEUTROPHILS NFR BLD AUTO: 6.26 10*3/MM3 (ref 1.7–7)
NEUTROPHILS NFR BLD AUTO: 73.9 % (ref 42.7–76)
NRBC BLD AUTO-RTO: 0 /100 WBC (ref 0–0.2)
PLATELET # BLD AUTO: 252 10*3/MM3 (ref 140–450)
PMV BLD AUTO: 12.4 FL (ref 6–12)
POTASSIUM SERPL-SCNC: 4.1 MMOL/L (ref 3.5–5.2)
PROT SERPL-MCNC: 7.5 G/DL (ref 6–8.5)
RBC # BLD AUTO: 4.36 10*6/MM3 (ref 3.77–5.28)
SODIUM SERPL-SCNC: 138 MMOL/L (ref 136–145)
WBC NRBC COR # BLD AUTO: 8.47 10*3/MM3 (ref 3.4–10.8)

## 2025-01-29 PROCEDURE — 80053 COMPREHEN METABOLIC PANEL: CPT

## 2025-01-29 PROCEDURE — 85025 COMPLETE CBC W/AUTO DIFF WBC: CPT

## 2025-01-29 PROCEDURE — 36415 COLL VENOUS BLD VENIPUNCTURE: CPT

## 2025-01-29 NOTE — TELEPHONE ENCOUNTER
Patient called and said that she did not have the pills at home like she thought she did. She thinks the one you all talked about yesterday was the Mesalamine. Please send a script in to [facility] for her. Thank you.

## 2025-03-13 DIAGNOSIS — K51.00 ULCERATIVE PANCOLITIS: Primary | ICD-10-CM

## 2025-03-13 RX ORDER — ETRASIMOD 2 MG/1
2 TABLET, FILM COATED ORAL DAILY
Qty: 30 TABLET | Refills: 5 | Status: SHIPPED | OUTPATIENT
Start: 2025-03-13

## 2025-03-14 ENCOUNTER — TELEPHONE (OUTPATIENT)
Dept: GASTROENTEROLOGY | Facility: CLINIC | Age: 50
End: 2025-03-14
Payer: COMMERCIAL

## 2025-03-14 NOTE — TELEPHONE ENCOUNTER
Patient has been notified.    ----- Message from Susan Brizuela sent at 3/13/2025  3:05 PM EDT -----  Let her know that her insurance declined Stelara.I am going to order new p.o. medication Velsipity 2 mg to take p.o. daily.  If approved she has to get a lab work done 2 weeks after starting the medication.I will discuss with her possible adverse reactions and precautions with this medicine during her next follow-up visit.  ----- Message -----  From: Melia Carrera MA  Sent: 3/13/2025  10:50 AM EDT  To: MD Cecile Mcfarlane messaged me and said her insurance denied Stelara  ----- Message -----  From: Melia Carrera MA  Sent: 1/29/2025   8:00 AM EDT  To: Melia Carrera MA      ----- Message -----  From: Susan Brizuela MD  Sent: 1/28/2025   6:13 PM EST  To: Michelle Correa; #    I have placed a therapy plan for Stelara for this patient  Please let me know if there is any issue with approval

## 2025-04-08 ENCOUNTER — OFFICE VISIT (OUTPATIENT)
Dept: GASTROENTEROLOGY | Facility: CLINIC | Age: 50
End: 2025-04-08
Payer: COMMERCIAL

## 2025-04-08 VITALS
HEART RATE: 77 BPM | BODY MASS INDEX: 26.8 KG/M2 | OXYGEN SATURATION: 99 % | TEMPERATURE: 97.8 F | HEIGHT: 64 IN | DIASTOLIC BLOOD PRESSURE: 84 MMHG | SYSTOLIC BLOOD PRESSURE: 122 MMHG | WEIGHT: 157 LBS

## 2025-04-08 DIAGNOSIS — Z86.2 HISTORY OF IRON DEFICIENCY ANEMIA: ICD-10-CM

## 2025-04-08 DIAGNOSIS — K51.00 ULCERATIVE PANCOLITIS: Primary | ICD-10-CM

## 2025-04-08 DIAGNOSIS — T88.7XXD NON-DOSE-RELATED ADVERSE REACTION TO MEDICATION, SUBSEQUENT ENCOUNTER: ICD-10-CM

## 2025-04-08 PROCEDURE — 1159F MED LIST DOCD IN RCRD: CPT | Performed by: INTERNAL MEDICINE

## 2025-04-08 PROCEDURE — 1160F RVW MEDS BY RX/DR IN RCRD: CPT | Performed by: INTERNAL MEDICINE

## 2025-04-08 PROCEDURE — 99214 OFFICE O/P EST MOD 30 MIN: CPT | Performed by: INTERNAL MEDICINE

## 2025-04-08 RX ORDER — HYDROXYZINE HYDROCHLORIDE 25 MG/1
25 TABLET, FILM COATED ORAL 3 TIMES DAILY PRN
COMMUNITY
Start: 2025-03-28

## 2025-04-08 RX ORDER — TRIAMCINOLONE ACETONIDE 1 MG/G
CREAM TOPICAL
COMMUNITY
Start: 2025-03-05

## 2025-04-08 NOTE — PROGRESS NOTES
Follow Up Note     Date: 2025   Patient Name: Stacy Guardado  MRN: [id number]  : 1975     Referring Physician: Shayna Swartz PA    Chief Complaint:    Chief Complaint   Patient presents with    Anemia     HX TOMI      Ulcerative Colitis       Interval History:   2025  Stacy Guardado is a 49 y.o. female who is here today for follow up for her Crohn's disease.  She states that after stopping the Humira in January her psoriasis in the hand cleared now.  She was taking topical steroid as well.  Has been having regular bowel movement without any significant diarrhea or constipation no abdominal pain.  She got recently approved for Velsipity and has not started taking it.     11/10/2020  She states that she noted to have red blood in stool three weeks ago and that got worse with loose stool.She also develeloped LLQ abdominal pain. Loose stool 5-7 times daily. Deny any fever chills. Patient was in emergency room 2 weeks ago with complaints of diarrhea abdominal pain and blood in the stool.  She had basic lab work done which revealed borderline leukocytosis.  She deny any prior constipation. She also had a CT scan of the abdomen pelvis done which revealed thickening in the descending colon the sigmoid suggesting colitis. She was given Augmentin for ten days. Weight is stable. Pt denies nausea vomiting or odynophagia or dysphagia. There is no history of acid reflux. There is no history of anemia. No prior history of EGD or colonoscopy. No family history of colon cancer or any GI malignancy, or IBD. No history of any abdominal surgery. Denies alcohol abuse or cigarette smoking.      Subjective      Past Medical History:   Past Medical History:   Diagnosis Date    Abnormal ECG     tachycardia    Bipolar affective     COPD (chronic obstructive pulmonary disease)     Diverticulitis     Elevated cholesterol     Multiple body piercings     ears x 5    Palpitations     Tattoos 2020    x's 3     Ulcerative colitis      Past Surgical History:   Past Surgical History:   Procedure Laterality Date    COLONOSCOPY N/A 2020    Procedure: COLONOSCOPY WITH BIOPSIES;  Surgeon: Susan Brizuela MD;  Location: [facility] ENDOSCOPY;  Service: Gastroenterology;  Laterality: N/A;    COLONOSCOPY N/A 2021    Procedure: COLONOSCOPY with biopsy,  polypectomy, endoscopic with hot snare polypectomy mucosal resection with injection of normal saline soft colaguation with clip placement x 3;  Surgeon: Susan Brizuela MD;  Location: [facility] ENDOSCOPY;  Service: Gastroenterology;  Laterality: N/A;    COLONOSCOPY N/A 2023    Procedure: COLONOSCOPY with biopsy and polypectomy;  Surgeon: Susan Brizuela MD;  Location: [facility] ENDOSCOPY;  Service: Gastroenterology;  Laterality: N/A;    ENDOSCOPY N/A 2021    Procedure: ESOPHAGOGASTRODUODENOSCOPY WITH BIOPSY ;  Surgeon: Susan Brizuela MD;  Location: [facility] ENDOSCOPY;  Service: Gastroenterology;  Laterality: N/A;    WISDOM TOOTH EXTRACTION         Family History:   Family History   Problem Relation Age of Onset    Hyperlipidemia Mother     Hyperlipidemia Father     Hypertension Father     Stroke Brother     Hyperlipidemia Brother     Heart attack Maternal Grandfather     Heart disease Maternal Grandfather     Heart disease Paternal Grandfather     Heart attack Paternal Grandfather     Alcohol abuse Other     Diabetes Other     Hypertension Other     Stroke Other     Colon cancer Neg Hx     Cirrhosis Neg Hx     Liver cancer Neg Hx     Liver disease Neg Hx     Breast cancer Neg Hx     Ovarian cancer Neg Hx        Social History:   Social History     Socioeconomic History    Marital status:    Tobacco Use    Smoking status: Former     Current packs/day: 0.00     Average packs/day: 1 pack/day for 10.0 years (10.0 ttl pk-yrs)     Types: Cigarettes     Start date: 2009     Quit date: 2019     Years since quittin.9    Smokeless  tobacco: Never   Vaping Use    Vaping status: Some Days    Last attempt to quit: 3/18/2020    Substances: Nicotine, Flavoring    Devices: Pre-filled or refillable cartridge, Refillable tank   Substance and Sexual Activity    Alcohol use: Not Currently    Drug use: No    Sexual activity: Defer       Medications:     Current Outpatient Medications:     atorvastatin (LIPITOR) 40 MG tablet, Take 1 tablet by mouth Daily., Disp: , Rfl:     cetirizine (zyrTEC) 10 MG tablet, Take 1 tablet by mouth Daily., Disp: , Rfl:     hydrOXYzine (ATARAX) 25 MG tablet, Take 1 tablet by mouth 3 (Three) Times a Day As Needed for Anxiety., Disp: , Rfl:     Multiple Vitamins-Minerals (ALIVE MULTI-VITAMIN PO), Take  by mouth., Disp: , Rfl:     omeprazole (priLOSEC) 20 MG capsule, Take 1 capsule by mouth 2 (Two) Times a Day., Disp: , Rfl:     ondansetron ODT (ZOFRAN-ODT) 4 MG disintegrating tablet, Take 1 tablet by mouth Every 12 (Twelve) Hours As Needed for Nausea or Vomiting., Disp: 30 tablet, Rfl: 1    Probiotic Product (Probiotic Daily) capsule, Take  by mouth Daily., Disp: , Rfl:     triamcinolone (KENALOG) 0.1 % cream, MIX WITH OTC TUB OF CETAPHIL CREAM AND APPLY TOPICALLY TO SKIN FROM NECK TO FEET 2 TIMES A DAY, Disp: , Rfl:     Etrasimod Arginine (Velsipity) 2 MG tablet, Take 2 mg by mouth Daily. (Patient not taking: Reported on 4/8/2025), Disp: 30 tablet, Rfl: 5    Allergies:   Allergies   Allergen Reactions    Augmentin [Amoxicillin-Pot Clavulanate] Diarrhea and GI Intolerance       Review of Systems:   Review of Systems   Constitutional:  Negative for appetite change, fatigue, fever and unexpected weight loss.   HENT:  Negative for trouble swallowing.    Gastrointestinal:  Positive for abdominal pain (feels like it could be muscular). Negative for abdominal distention, anal bleeding, blood in stool, constipation, diarrhea, nausea, rectal pain, vomiting, GERD and indigestion.       The following portions of the patient's history  "were reviewed and updated as appropriate: allergies, current medications, past family history, past medical history, past social history, past surgical history and problem list.    Objective     Physical Exam:  Vital Signs:   Vitals:    04/08/25 1601   BP: 122/84   Pulse: 77   Temp: 97.8 °F (36.6 °C)   TempSrc: Infrared   SpO2: 99%   Weight: 71.2 kg (157 lb)  Comment: with clothing/shoes   Height: 162.6 cm (64\")  Comment: per EPIC       Physical Exam  Constitutional:       Appearance: Normal appearance.   HENT:      Head: Normocephalic and atraumatic.   Eyes:      Conjunctiva/sclera: Conjunctivae normal.   Abdominal:      General: Abdomen is flat. There is no distension.      Palpations: There is no mass.      Tenderness: There is no abdominal tenderness. There is no guarding or rebound.      Hernia: No hernia is present.   Musculoskeletal:      Cervical back: Normal range of motion and neck supple.   Neurological:      Mental Status: She is alert.         Results Review:   I reviewed the patient's new clinical results.    Lab on 01/29/2025   Component Date Value Ref Range Status    WBC 01/29/2025 8.47  3.40 - 10.80 10*3/mm3 Final    RBC 01/29/2025 4.36  3.77 - 5.28 10*6/mm3 Final    Hemoglobin 01/29/2025 13.2  12.0 - 15.9 g/dL Final    Hematocrit 01/29/2025 40.1  34.0 - 46.6 % Final    MCV 01/29/2025 92.0  79.0 - 97.0 fL Final    MCH 01/29/2025 30.3  26.6 - 33.0 pg Final    MCHC 01/29/2025 32.9  31.5 - 35.7 g/dL Final    RDW 01/29/2025 12.8  12.3 - 15.4 % Final    RDW-SD 01/29/2025 43.0  37.0 - 54.0 fl Final    MPV 01/29/2025 12.4 (H)  6.0 - 12.0 fL Final    Platelets 01/29/2025 252  140 - 450 10*3/mm3 Final    Neutrophil % 01/29/2025 73.9  42.7 - 76.0 % Final    Lymphocyte % 01/29/2025 16.3 (L)  19.6 - 45.3 % Final    Monocyte % 01/29/2025 8.1  5.0 - 12.0 % Final    Eosinophil % 01/29/2025 1.1  0.3 - 6.2 % Final    Basophil % 01/29/2025 0.4  0.0 - 1.5 % Final    Immature Grans % 01/29/2025 0.2  0.0 - 0.5 % Final "    Neutrophils, Absolute 01/29/2025 6.26  1.70 - 7.00 10*3/mm3 Final    Lymphocytes, Absolute 01/29/2025 1.38  0.70 - 3.10 10*3/mm3 Final    Monocytes, Absolute 01/29/2025 0.69  0.10 - 0.90 10*3/mm3 Final    Eosinophils, Absolute 01/29/2025 0.09  0.00 - 0.40 10*3/mm3 Final    Basophils, Absolute 01/29/2025 0.03  0.00 - 0.20 10*3/mm3 Final    Immature Grans, Absolute 01/29/2025 0.02  0.00 - 0.05 10*3/mm3 Final    nRBC 01/29/2025 0.0  0.0 - 0.2 /100 WBC Final    Glucose 01/29/2025 96  65 - 99 mg/dL Final    BUN 01/29/2025 8  6 - 20 mg/dL Final    Creatinine 01/29/2025 0.82  0.57 - 1.00 mg/dL Final    Sodium 01/29/2025 138  136 - 145 mmol/L Final    Potassium 01/29/2025 4.1  3.5 - 5.2 mmol/L Final    Chloride 01/29/2025 104  98 - 107 mmol/L Final    CO2 01/29/2025 24.6  22.0 - 29.0 mmol/L Final    Calcium 01/29/2025 9.4  8.6 - 10.5 mg/dL Final    Total Protein 01/29/2025 7.5  6.0 - 8.5 g/dL Final    Albumin 01/29/2025 4.2  3.5 - 5.2 g/dL Final    ALT (SGPT) 01/29/2025 39 (H)  1 - 33 U/L Final    AST (SGOT) 01/29/2025 32  1 - 32 U/L Final    Alkaline Phosphatase 01/29/2025 105  39 - 117 U/L Final    Total Bilirubin 01/29/2025 0.2  0.0 - 1.2 mg/dL Final    Globulin 01/29/2025 3.3  gm/dL Final    A/G Ratio 01/29/2025 1.3  g/dL Final    BUN/Creatinine Ratio 01/29/2025 9.8  7.0 - 25.0 Final    Anion Gap 01/29/2025 9.4  5.0 - 15.0 mmol/L Final    eGFR 01/29/2025 88.9  >60.0 mL/min/1.73 Final      No radiology results for the last 90 days.    colonoscopy  9/21/2023  - Diverticulosis in the sigmoid colon and in the descending colon.  - Inactive (Luu Score 0) pancolitis ulcerative colitis, improved since the last examination. Biopsied.  - Multipel large pesudopolyps were found in the sigmoid colon, descending colon. Biopsied. No alarming features with NBI  - The examined portion of the ileum was normal. Biopsied  - Endoscopically no active disease and on endoscopic remission     DIAGNOSIS:  A. CECUM BIOPSY, AND  ASCENDING:  Colonic type mucosa with no significant histopathologic abnormalities  B. SIGMOID COLON POLYP, PSEUDO:  Benign mucosal fold, multiple deeper levels evaluated  C. TRANSVERSE COLON BIOPSY:  Colonic type mucosa with no significant histopathologic abnormalities  D. DESCENDING COLON BIOPSY:  Colonic type mucosa with no significant histopathologic abnormalities  E. SIGMOID COLON BIOPSY:  Colonic type mucosa with no significant histopathologic abnormalities  F. RECTAL BIOPSY:  Colonic type mucosa with no significant histopathologic abnormalities  G. TERMINAL ILEUM BIOPSY:  Small intestinal mucosa with no significant histopathologic abnormalities  H. CECUM POLYP, EMR EDGE:  Benign mucosal fold, multiple deeper levels evaluated    Assessment / Plan      1. Ulcerative pancolitis on remission  2. History of iron deficiency anemia  3. 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  4.  History of elevated liver enzymes  4/8/2025  Patient is clinically doing well. Patient had a significant psoriatic plaques involving the both the hands likely secondary to Humira use as she did not have any skin lesions before.  After stopping the Humira in January her hand psoriasis lesions cleared.  She was also using topical steroids.  Her insurance declined Stelara but approved Velsipity..  Denies any current significant abdominal symptoms. Last lab work on 1/29/2025 reveals normal CMP except borderline ALT 39.  CBC was normal with normal hemoglobin 13.2.    We discussed Shira risk and benefits involved with the Velsipity including rare development of blood clots and cardiac issues.  Patient is agreeable to proceed with the medication.    Will start on Velsipity 2 mg p.o. daily to start from today.  Prescription ready at her pharmacy  Advised to discontinue mesalamine now  CBC CMP in the next 2 weeks  Patient takes applesauce for occasional constipation advised MiraLAX as needed if needed  Okay to continue  multivitamins.  Repeat colonoscopy next year in 2026.  Will consider chroma endoscopy sometime after the next colonoscopy.  Follow-up in 3 months     1/22/2024  Colonoscopy done on 9/21/2023 did not reveal any endoscopic signs of colitis or ileitis.  Chronic biopsies and ileal biopsies were normal.  Patient did have a pseudopolyps in the sigmoid colon and the descending colon.  Patient is in a clinical, endoscopic, microscopic remission.  Last lab work was in July 2023 which revealed normal CMP with borderline glucose of 117.  CBC was normal.  Her last Humira level on 1/10/2023 was 9.1 with antibodies less than 25. Patient is currently on Humira weekly due to low therapeutic Humira level.      7/14/2022  She is doing very well and appears to be in a clinical remission. She is currently taking Humira 40 mg subcu q. weekly along with Imuran 100 mg p.o. daily.  Mesalamine was discontinued last visit.  Her hemoglobin is normal now. Calprotectin normalized to 97 from over 1000.  CBC CMP unremarkable with normal hemoglobin now 12.7 g/dL adalimumab level low at 2.5 with the antibodies less than 25. She has not started her Humira weekly doses yet.  Due to her low Humira trough level her Humira dose interval was reduced to 40 mg subcu weekly.  We will discontinue her Apriso.  We will continue with the Humira and Imuran for now     12/9/2020   she had a colonoscopy done on 11/17/2020 which revealed extensive diffuse colitis involving the colon up to the hepatic flexure from the rectum.  Dispersed inflammation noted from hyperflexion to the cecum.  Anaspaz normal.  Examined terminal ileum for about 15 cm from IC valve was normal. Pathology revealed acute severe colitis with crypt abscess.  Patient had chronic diarrhea more than 2 months. These findings are more in favor of ulcerative colitis. Recent TB Gold test was negative.  Chronic hepatitis panel was negative for any chronic hep C or hep B infection. She is currently on a  tapering dose of steroids. She would definitely benefit from Biologics however at this time patient's insurance does not cover Remicade or the Humira or newer Biologics. We will start her on Apriso along with the Imuran and see how she does with that regimen.      Prior history  5.  History of dysphagia unspecified  6.  Gastroesophageal reflux disease without esophagitis  No significant symptoms now,  we will continue PPI as needed  7. History of C. difficile colitis         Follow Up:   No follow-ups on file.    Susan Brizuela MD  Gastroenterology [place]  4/8/2025  16:04 EDT     Please note that portions of this note may have been completed with a voice recognition program.

## 2025-04-16 ENCOUNTER — HOSPITAL ENCOUNTER (OUTPATIENT)
Dept: MAMMOGRAPHY | Facility: HOSPITAL | Age: 50
Discharge: HOME | End: 2025-04-16
Admitting: PHYSICIAN ASSISTANT
Payer: COMMERCIAL

## 2025-04-16 DIAGNOSIS — Z12.31 BREAST CANCER SCREENING BY MAMMOGRAM: ICD-10-CM

## 2025-04-16 PROCEDURE — 77063 BREAST TOMOSYNTHESIS BI: CPT

## 2025-04-16 PROCEDURE — 77067 SCR MAMMO BI INCL CAD: CPT

## 2025-06-23 ENCOUNTER — LAB (OUTPATIENT)
Dept: LAB | Facility: HOSPITAL | Age: 50
End: 2025-06-23
Payer: COMMERCIAL

## 2025-06-23 DIAGNOSIS — K51.00 ULCERATIVE PANCOLITIS: ICD-10-CM

## 2025-06-23 LAB
ALBUMIN SERPL-MCNC: 3.9 G/DL (ref 3.5–5.2)
ALBUMIN/GLOB SERPL: 1.3 G/DL
ALP SERPL-CCNC: 93 U/L (ref 39–117)
ALT SERPL W P-5'-P-CCNC: 33 U/L (ref 1–33)
ANION GAP SERPL CALCULATED.3IONS-SCNC: 13 MMOL/L (ref 5–15)
AST SERPL-CCNC: 30 U/L (ref 1–32)
BASOPHILS # BLD AUTO: 0.03 10*3/MM3 (ref 0–0.2)
BASOPHILS NFR BLD AUTO: 0.6 % (ref 0–1.5)
BILIRUB SERPL-MCNC: 0.3 MG/DL (ref 0–1.2)
BUN SERPL-MCNC: 10 MG/DL (ref 6–20)
BUN/CREAT SERPL: 12.8 (ref 7–25)
CALCIUM SPEC-SCNC: 9.3 MG/DL (ref 8.6–10.5)
CHLORIDE SERPL-SCNC: 104 MMOL/L (ref 98–107)
CO2 SERPL-SCNC: 24 MMOL/L (ref 22–29)
CREAT SERPL-MCNC: 0.78 MG/DL (ref 0.57–1)
DEPRECATED RDW RBC AUTO: 47.5 FL (ref 37–54)
EGFRCR SERPLBLD CKD-EPI 2021: 94.4 ML/MIN/1.73
EOSINOPHIL # BLD AUTO: 0.05 10*3/MM3 (ref 0–0.4)
EOSINOPHIL NFR BLD AUTO: 1 % (ref 0.3–6.2)
ERYTHROCYTE [DISTWIDTH] IN BLOOD BY AUTOMATED COUNT: 14.4 % (ref 12.3–15.4)
GLOBULIN UR ELPH-MCNC: 3.1 GM/DL
GLUCOSE SERPL-MCNC: 89 MG/DL (ref 65–99)
HCT VFR BLD AUTO: 39.8 % (ref 34–46.6)
HGB BLD-MCNC: 12.7 G/DL (ref 12–15.9)
IMM GRANULOCYTES # BLD AUTO: 0.03 10*3/MM3 (ref 0–0.05)
IMM GRANULOCYTES NFR BLD AUTO: 0.6 % (ref 0–0.5)
LYMPHOCYTES # BLD AUTO: 0.29 10*3/MM3 (ref 0.7–3.1)
LYMPHOCYTES NFR BLD AUTO: 5.5 % (ref 19.6–45.3)
MCH RBC QN AUTO: 29.3 PG (ref 26.6–33)
MCHC RBC AUTO-ENTMCNC: 31.9 G/DL (ref 31.5–35.7)
MCV RBC AUTO: 91.9 FL (ref 79–97)
MONOCYTES # BLD AUTO: 0.47 10*3/MM3 (ref 0.1–0.9)
MONOCYTES NFR BLD AUTO: 9 % (ref 5–12)
NEUTROPHILS NFR BLD AUTO: 4.37 10*3/MM3 (ref 1.7–7)
NEUTROPHILS NFR BLD AUTO: 83.3 % (ref 42.7–76)
NRBC BLD AUTO-RTO: 0 /100 WBC (ref 0–0.2)
PLATELET # BLD AUTO: 239 10*3/MM3 (ref 140–450)
PMV BLD AUTO: 12.3 FL (ref 6–12)
POTASSIUM SERPL-SCNC: 4.3 MMOL/L (ref 3.5–5.2)
PROT SERPL-MCNC: 7 G/DL (ref 6–8.5)
RBC # BLD AUTO: 4.33 10*6/MM3 (ref 3.77–5.28)
SODIUM SERPL-SCNC: 141 MMOL/L (ref 136–145)
WBC NRBC COR # BLD AUTO: 5.24 10*3/MM3 (ref 3.4–10.8)

## 2025-06-23 PROCEDURE — 85025 COMPLETE CBC W/AUTO DIFF WBC: CPT

## 2025-06-23 PROCEDURE — 80053 COMPREHEN METABOLIC PANEL: CPT

## 2025-06-23 PROCEDURE — 36415 COLL VENOUS BLD VENIPUNCTURE: CPT

## 2025-07-15 NOTE — PROGRESS NOTES
Follow Up Note     Date: 2025   Patient Name: Stacy Guardado  MRN: [id number]  : 1975     Referring Physician: Shayna Swartz PA    Chief Complaint:    Chief Complaint   Patient presents with    Ulcerative Colitis    Anemia     Hx of          Interval History:   2025    Stacy Guardado is a 49 y.o. female who is here today for follow up for her ulcerative colitis.  She states that she has been doing pretty well as far as the GI symptoms concerned however after starting the Velsipity she has been getting significant headache and episodes of dizziness.  She does not think that she can continue with that medicine.  She has been having a daily bowel movement regular without any issues now.     11/10/2020  She states that she noted to have red blood in stool three weeks ago and that got worse with loose stool.She also develeloped LLQ abdominal pain. Loose stool 5-7 times daily. Deny any fever chills. Patient was in emergency room 2 weeks ago with complaints of diarrhea abdominal pain and blood in the stool.  She had basic lab work done which revealed borderline leukocytosis.  She deny any prior constipation. She also had a CT scan of the abdomen pelvis done which revealed thickening in the descending colon the sigmoid suggesting colitis. She was given Augmentin for ten days. Weight is stable. Pt denies nausea vomiting or odynophagia or dysphagia. There is no history of acid reflux. There is no history of anemia. No prior history of EGD or colonoscopy. No family history of colon cancer or any GI malignancy, or IBD. No history of any abdominal surgery. Denies alcohol abuse or cigarette smoking.        Subjective      Past Medical History:   Past Medical History:   Diagnosis Date    Abnormal ECG     tachycardia    Bipolar affective     COPD (chronic obstructive pulmonary disease)     Diverticulitis     Elevated cholesterol     Multiple body piercings     ears x 5    Palpitations     Tattoos  11/12/2020    x's 3    Ulcerative colitis      Past Surgical History:   Past Surgical History:   Procedure Laterality Date    COLONOSCOPY N/A 11/17/2020    Procedure: COLONOSCOPY WITH BIOPSIES;  Surgeon: Susan Brizuela MD;  Location: [facility] ENDOSCOPY;  Service: Gastroenterology;  Laterality: N/A;    COLONOSCOPY N/A 09/17/2021    Procedure: COLONOSCOPY with biopsy,  polypectomy, endoscopic with hot snare polypectomy mucosal resection with injection of normal saline soft colaguation with clip placement x 3;  Surgeon: Susan Brizuela MD;  Location: [facility] ENDOSCOPY;  Service: Gastroenterology;  Laterality: N/A;    COLONOSCOPY N/A 9/21/2023    Procedure: COLONOSCOPY with biopsy and polypectomy;  Surgeon: Susan Brizuela MD;  Location: [facility] ENDOSCOPY;  Service: Gastroenterology;  Laterality: N/A;    ENDOSCOPY N/A 08/02/2021    Procedure: ESOPHAGOGASTRODUODENOSCOPY WITH BIOPSY ;  Surgeon: Susan Brizuela MD;  Location: [facility] ENDOSCOPY;  Service: Gastroenterology;  Laterality: N/A;    WISDOM TOOTH EXTRACTION         Family History:   Family History   Problem Relation Age of Onset    Hyperlipidemia Mother     Hyperlipidemia Father     Hypertension Father     Stroke Brother     Hyperlipidemia Brother     Heart attack Maternal Grandfather     Heart disease Maternal Grandfather     Heart disease Paternal Grandfather     Heart attack Paternal Grandfather     Alcohol abuse Other     Diabetes Other     Hypertension Other     Stroke Other     Colon cancer Neg Hx     Cirrhosis Neg Hx     Liver cancer Neg Hx     Liver disease Neg Hx     Breast cancer Neg Hx     Ovarian cancer Neg Hx        Social History:   Social History     Socioeconomic History    Marital status:    Tobacco Use    Smoking status: Former     Current packs/day: 0.00     Average packs/day: 1 pack/day for 10.0 years (10.0 ttl pk-yrs)     Types: Cigarettes     Start date: 4/30/2009     Quit date: 4/30/2019     Years since  quittin.2    Smokeless tobacco: Never   Vaping Use    Vaping status: Some Days    Last attempt to quit: 3/18/2020    Substances: Nicotine, Flavoring    Devices: Pre-filled or refillable cartridge, Refillable tank   Substance and Sexual Activity    Alcohol use: Not Currently    Drug use: No    Sexual activity: Defer       Medications:     Current Outpatient Medications:     atorvastatin (LIPITOR) 40 MG tablet, Take 1 tablet by mouth Daily., Disp: , Rfl:     cetirizine (zyrTEC) 10 MG tablet, Take 1 tablet by mouth Daily., Disp: , Rfl:     Etrasimod Arginine (Velsipity) 2 MG tablet, Take 2 mg by mouth Daily., Disp: 30 tablet, Rfl: 5    hydrOXYzine (ATARAX) 25 MG tablet, Take 1 tablet by mouth 3 (Three) Times a Day As Needed for Anxiety., Disp: , Rfl:     Multiple Vitamins-Minerals (ALIVE MULTI-VITAMIN PO), Take  by mouth., Disp: , Rfl:     omeprazole (priLOSEC) 20 MG capsule, Take 1 capsule by mouth 2 (Two) Times a Day., Disp: , Rfl:     ondansetron ODT (ZOFRAN-ODT) 4 MG disintegrating tablet, Take 1 tablet by mouth Every 12 (Twelve) Hours As Needed for Nausea or Vomiting., Disp: 30 tablet, Rfl: 1    Probiotic Product (Probiotic Daily) capsule, Take  by mouth Daily., Disp: , Rfl:     triamcinolone (KENALOG) 0.1 % cream, MIX WITH OTC TUB OF CETAPHIL CREAM AND APPLY TOPICALLY TO SKIN FROM NECK TO FEET 2 TIMES A DAY, Disp: , Rfl:     Allergies:   Allergies   Allergen Reactions    Humira (1 Pen) [Adalimumab] Rash     Rash on the hands      Augmentin [Amoxicillin-Pot Clavulanate] Diarrhea and GI Intolerance       Review of Systems:   Review of Systems   Constitutional:  Negative for appetite change, fatigue, fever and unexpected weight loss.   HENT:  Negative for trouble swallowing.    Gastrointestinal:  Negative for abdominal distention, abdominal pain, anal bleeding, blood in stool, constipation, diarrhea, nausea, rectal pain, vomiting, GERD and indigestion.       The following portions of the patient's history were  "reviewed and updated as appropriate: allergies, current medications, past family history, past medical history, past social history, past surgical history and problem list.    Objective     Physical Exam:  Vital Signs:   Vitals:    07/16/25 1559   BP: 120/78   Pulse: 84   Temp: 98 °F (36.7 °C)   TempSrc: Infrared   SpO2: 98%   Weight: 68.9 kg (152 lb)   Height: 162.6 cm (64\")       Physical Exam  Constitutional:       Appearance: Normal appearance.   HENT:      Head: Normocephalic and atraumatic.   Eyes:      Conjunctiva/sclera: Conjunctivae normal.   Abdominal:      General: Abdomen is flat. There is no distension.      Palpations: There is no mass.      Tenderness: There is no abdominal tenderness. There is no guarding or rebound.      Hernia: No hernia is present.   Musculoskeletal:      Cervical back: Normal range of motion and neck supple.   Neurological:      Mental Status: She is alert.         Results Review:   I reviewed the patient's new clinical results.    Lab on 06/23/2025   Component Date Value Ref Range Status    Glucose 06/23/2025 89  65 - 99 mg/dL Final    BUN 06/23/2025 10.0  6.0 - 20.0 mg/dL Final    Creatinine 06/23/2025 0.78  0.57 - 1.00 mg/dL Final    Sodium 06/23/2025 141  136 - 145 mmol/L Final    Potassium 06/23/2025 4.3  3.5 - 5.2 mmol/L Final    Chloride 06/23/2025 104  98 - 107 mmol/L Final    CO2 06/23/2025 24.0  22.0 - 29.0 mmol/L Final    Calcium 06/23/2025 9.3  8.6 - 10.5 mg/dL Final    Total Protein 06/23/2025 7.0  6.0 - 8.5 g/dL Final    Albumin 06/23/2025 3.9  3.5 - 5.2 g/dL Final    ALT (SGPT) 06/23/2025 33  1 - 33 U/L Final    AST (SGOT) 06/23/2025 30  1 - 32 U/L Final    Alkaline Phosphatase 06/23/2025 93  39 - 117 U/L Final    Total Bilirubin 06/23/2025 0.3  0.0 - 1.2 mg/dL Final    Globulin 06/23/2025 3.1  gm/dL Final    A/G Ratio 06/23/2025 1.3  g/dL Final    BUN/Creatinine Ratio 06/23/2025 12.8  7.0 - 25.0 Final    Anion Gap 06/23/2025 13.0  5.0 - 15.0 mmol/L Final    eGFR " 06/23/2025 94.4  >60.0 mL/min/1.73 Final    WBC 06/23/2025 5.24  3.40 - 10.80 10*3/mm3 Final    RBC 06/23/2025 4.33  3.77 - 5.28 10*6/mm3 Final    Hemoglobin 06/23/2025 12.7  12.0 - 15.9 g/dL Final    Hematocrit 06/23/2025 39.8  34.0 - 46.6 % Final    MCV 06/23/2025 91.9  79.0 - 97.0 fL Final    MCH 06/23/2025 29.3  26.6 - 33.0 pg Final    MCHC 06/23/2025 31.9  31.5 - 35.7 g/dL Final    RDW 06/23/2025 14.4  12.3 - 15.4 % Final    RDW-SD 06/23/2025 47.5  37.0 - 54.0 fl Final    MPV 06/23/2025 12.3 (H)  6.0 - 12.0 fL Final    Platelets 06/23/2025 239  140 - 450 10*3/mm3 Final    Neutrophil % 06/23/2025 83.3 (H)  42.7 - 76.0 % Final    Lymphocyte % 06/23/2025 5.5 (L)  19.6 - 45.3 % Final    Monocyte % 06/23/2025 9.0  5.0 - 12.0 % Final    Eosinophil % 06/23/2025 1.0  0.3 - 6.2 % Final    Basophil % 06/23/2025 0.6  0.0 - 1.5 % Final    Immature Grans % 06/23/2025 0.6 (H)  0.0 - 0.5 % Final    Neutrophils, Absolute 06/23/2025 4.37  1.70 - 7.00 10*3/mm3 Final    Lymphocytes, Absolute 06/23/2025 0.29 (L)  0.70 - 3.10 10*3/mm3 Final    Monocytes, Absolute 06/23/2025 0.47  0.10 - 0.90 10*3/mm3 Final    Eosinophils, Absolute 06/23/2025 0.05  0.00 - 0.40 10*3/mm3 Final    Basophils, Absolute 06/23/2025 0.03  0.00 - 0.20 10*3/mm3 Final    Immature Grans, Absolute 06/23/2025 0.03  0.00 - 0.05 10*3/mm3 Final    nRBC 06/23/2025 0.0  0.0 - 0.2 /100 WBC Final      No radiology results for the last 90 days.    colonoscopy  9/21/2023  - Diverticulosis in the sigmoid colon and in the descending colon.  - Inactive (Luu Score 0) pancolitis ulcerative colitis, improved since the last examination. Biopsied.  - Multipel large pesudopolyps were found in the sigmoid colon, descending colon. Biopsied. No alarming features with NBI  - The examined portion of the ileum was normal. Biopsied  - Endoscopically no active disease and on endoscopic remission     DIAGNOSIS:  A. CECUM BIOPSY, AND ASCENDING:  Colonic type mucosa with no significant  histopathologic abnormalities  B. SIGMOID COLON POLYP, PSEUDO:  Benign mucosal fold, multiple deeper levels evaluated  C. TRANSVERSE COLON BIOPSY:  Colonic type mucosa with no significant histopathologic abnormalities  D. DESCENDING COLON BIOPSY:  Colonic type mucosa with no significant histopathologic abnormalities  E. SIGMOID COLON BIOPSY:  Colonic type mucosa with no significant histopathologic abnormalities  F. RECTAL BIOPSY:  Colonic type mucosa with no significant histopathologic abnormalities  G. TERMINAL ILEUM BIOPSY:  Small intestinal mucosa with no significant histopathologic abnormalities  H. CECUM POLYP, EMR EDGE:    Assessment / Plan      1. Ulcerative pancolitis on remission  2. History of iron deficiency anemia  3. 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  4. History of elevated liver enzymes  7/16/2025  Patient has a significant headache and dizziness episodes after starting on a Velsipity.  She thinks that she may not be able to continue that medication without side effects.  Currently patient is in clinical, endoscopic and pathological remission.  Patient was initially on mesalamine and Imuran in 2020 that was changed to Humira later on.  However patient developed Humira induced psoriasis for which Humira was discontinued this year and was started on Velsipity in March 2025.  Lab work on 6/23/2025 reveals normal CMP.  CBC was normal with a WBC 5.24 and hemoglobin 12.7 platelet count of 239,000.  Liver enzymes normalized now    Patient wants to stop all the medicines for Crohn's.  We had a discussion regarding considering different Biologics however patient does not want to take any of the shots or infusion.  Discussed starting Rinvoq but patient deferred considering the side effects.  Finally we agreed on starting back on Apriso 4 tablets p.o. daily.  Patient is aware that she has a slightly higher risk of flareup.     Apriso 0.375 g-4 capsules p.o. daily.  Okay to continue  multivitamins.  Repeat colonoscopy next year in 2026.  Currently not taking her Prilosec, advised to start taking Pepcid 20 mg p.o. daily if any indigestion with mesalamine  Follow-up in 3 months with a lab work  May need a chromoendoscopy in the future    4/8/2025  Patient is clinically doing well. Patient had a significant psoriatic plaques involving the both the hands likely secondary to Humira use as she did not have any skin lesions before.  After stopping the Humira in January her hand psoriasis lesions cleared.  She was also using topical steroids.  Her insurance declined Stelara but approved Velsipity..  Denies any current significant abdominal symptoms. Last lab work on 1/29/2025 reveals normal CMP except borderline ALT 39.  CBC was normal with normal hemoglobin 13.2.    1/22/2024  Colonoscopy done on 9/21/2023 did not reveal any endoscopic signs of colitis or ileitis.  Chronic biopsies and ileal biopsies were normal.  Patient did have a pseudopolyps in the sigmoid colon and the descending colon.  Patient is in a clinical, endoscopic, microscopic remission.  Last lab work was in July 2023 which revealed normal CMP with borderline glucose of 117.  CBC was normal.  Her last Humira level on 1/10/2023 was 9.1 with antibodies less than 25. Patient is currently on Humira weekly due to low therapeutic Humira level.      7/14/2022  She is doing very well and appears to be in a clinical remission. She is currently taking Humira 40 mg subcu q. weekly along with Imuran 100 mg p.o. daily.  Mesalamine was discontinued last visit.  Her hemoglobin is normal now. Calprotectin normalized to 97 from over 1000.  CBC CMP unremarkable with normal hemoglobin now 12.7 g/dL adalimumab level low at 2.5 with the antibodies less than 25. She has not started her Humira weekly doses yet.  Due to her low Humira trough level her Humira dose interval was reduced to 40 mg subcu weekly.  We will discontinue her Apriso.  We will continue  with the Humira and Imuran for now     12/9/2020   she had a colonoscopy done on 11/17/2020 which revealed extensive diffuse colitis involving the colon up to the hepatic flexure from the rectum.  Dispersed inflammation noted from hyperflexion to the cecum.  Anaspaz normal.  Examined terminal ileum for about 15 cm from IC valve was normal. Pathology revealed acute severe colitis with crypt abscess.  Patient had chronic diarrhea more than 2 months. These findings are more in favor of ulcerative colitis. Recent TB Gold test was negative.  Chronic hepatitis panel was negative for any chronic hep C or hep B infection. She is currently on a tapering dose of steroids. She would definitely benefit from Biologics however at this time patient's insurance does not cover Remicade or the Humira or newer Biologics. We will start her on Apriso along with the Imuran and see how she does with that regimen.      Prior history  5.  History of dysphagia unspecified  6.  Gastroesophageal reflux disease without esophagitis  No significant symptoms now,  we will continue PPI as needed  7. History of C. difficile colitis       Follow Up:   No follow-ups on file.    Susan Brizuela MD  [facility]  7/16/2025  16:02 EDT     Please note that portions of this note may have been completed with a voice recognition program.

## 2025-07-16 ENCOUNTER — OFFICE VISIT (OUTPATIENT)
Dept: GASTROENTEROLOGY | Facility: CLINIC | Age: 50
End: 2025-07-16
Payer: COMMERCIAL

## 2025-07-16 VITALS
WEIGHT: 152 LBS | HEIGHT: 64 IN | TEMPERATURE: 98 F | DIASTOLIC BLOOD PRESSURE: 78 MMHG | BODY MASS INDEX: 25.95 KG/M2 | HEART RATE: 84 BPM | SYSTOLIC BLOOD PRESSURE: 120 MMHG | OXYGEN SATURATION: 98 %

## 2025-07-16 DIAGNOSIS — K51.00 ULCERATIVE PANCOLITIS: Primary | ICD-10-CM

## 2025-07-16 DIAGNOSIS — Z86.2 HISTORY OF IRON DEFICIENCY ANEMIA: ICD-10-CM

## 2025-07-16 DIAGNOSIS — R79.89 ELEVATED LFTS: ICD-10-CM

## 2025-07-16 PROCEDURE — 1159F MED LIST DOCD IN RCRD: CPT | Performed by: INTERNAL MEDICINE

## 2025-07-16 PROCEDURE — 99214 OFFICE O/P EST MOD 30 MIN: CPT | Performed by: INTERNAL MEDICINE

## 2025-07-16 PROCEDURE — 1160F RVW MEDS BY RX/DR IN RCRD: CPT | Performed by: INTERNAL MEDICINE

## 2025-07-16 RX ORDER — MESALAMINE 0.38 G/1
CAPSULE, EXTENDED RELEASE ORAL
Qty: 120 CAPSULE | Refills: 5 | Status: SHIPPED | OUTPATIENT
Start: 2025-07-16

## (undated) DEVICE — LUBE JELLY PK/2.75GM STRL BX/144

## (undated) DEVICE — FRCP BIOP COLD ENDOJAW ALLGTR W/NDL 2.8X2300MM BLU

## (undated) DEVICE — Device

## (undated) DEVICE — QUICK CATCH IN-LINE SUCTION POLYP TRAP IS USED FOR SUCTION RETRIEVAL OF ENDOSCOPICALLY REMOVED POLYPS.

## (undated) DEVICE — CONMED SCOPE SAVER BITE BLOCK, 20X27 MM: Brand: SCOPE SAVER

## (undated) DEVICE — ENDOSCOPY PORT CONNECTOR FOR OLYMPUS® SCOPES: Brand: ERBE

## (undated) DEVICE — NDL SCLEROTHERAPY INTERJECT 25G 4 240CM

## (undated) DEVICE — VLV SXN AIR/H2O ORCAPOD3 1P/U STRL

## (undated) DEVICE — HYBRID TUBING/CAP SET FOR OLYMPUS® SCOPES: Brand: ERBE

## (undated) DEVICE — SINGLE-USE POLYPECTOMY SNARE: Brand: CAPTIVATOR II

## (undated) DEVICE — FRCP BX RADJAW4 NDL 2.8 240 STD OG

## (undated) DEVICE — PAD GRND REM POLYHESIVE A/ DISP

## (undated) DEVICE — SUCTION CANISTER, 1500CC, RIGID: Brand: DEROYAL